# Patient Record
Sex: MALE | Race: BLACK OR AFRICAN AMERICAN | NOT HISPANIC OR LATINO | URBAN - METROPOLITAN AREA
[De-identification: names, ages, dates, MRNs, and addresses within clinical notes are randomized per-mention and may not be internally consistent; named-entity substitution may affect disease eponyms.]

---

## 2020-10-07 ENCOUNTER — OUTPATIENT (OUTPATIENT)
Dept: OUTPATIENT SERVICES | Facility: HOSPITAL | Age: 50
LOS: 1 days | End: 2020-10-07
Payer: COMMERCIAL

## 2020-10-07 VITALS
TEMPERATURE: 98 F | HEART RATE: 94 BPM | DIASTOLIC BLOOD PRESSURE: 96 MMHG | SYSTOLIC BLOOD PRESSURE: 144 MMHG | RESPIRATION RATE: 15 BRPM | HEIGHT: 69 IN | WEIGHT: 131.4 LBS | OXYGEN SATURATION: 100 %

## 2020-10-07 DIAGNOSIS — N21.0 CALCULUS IN BLADDER: ICD-10-CM

## 2020-10-07 DIAGNOSIS — Z01.818 ENCOUNTER FOR OTHER PREPROCEDURAL EXAMINATION: ICD-10-CM

## 2020-10-07 LAB
ANION GAP SERPL CALC-SCNC: 12 MMOL/L — SIGNIFICANT CHANGE UP (ref 5–17)
BUN SERPL-MCNC: 13 MG/DL — SIGNIFICANT CHANGE UP (ref 7–23)
CALCIUM SERPL-MCNC: 10.2 MG/DL — SIGNIFICANT CHANGE UP (ref 8.4–10.5)
CHLORIDE SERPL-SCNC: 104 MMOL/L — SIGNIFICANT CHANGE UP (ref 96–108)
CO2 SERPL-SCNC: 29 MMOL/L — SIGNIFICANT CHANGE UP (ref 22–31)
CREAT SERPL-MCNC: 0.84 MG/DL — SIGNIFICANT CHANGE UP (ref 0.5–1.3)
GLUCOSE SERPL-MCNC: 107 MG/DL — HIGH (ref 70–99)
HCT VFR BLD CALC: 45.5 % — SIGNIFICANT CHANGE UP (ref 39–50)
HGB BLD-MCNC: 15.3 G/DL — SIGNIFICANT CHANGE UP (ref 13–17)
MCHC RBC-ENTMCNC: 31.4 PG — SIGNIFICANT CHANGE UP (ref 27–34)
MCHC RBC-ENTMCNC: 33.6 GM/DL — SIGNIFICANT CHANGE UP (ref 32–36)
MCV RBC AUTO: 93.2 FL — SIGNIFICANT CHANGE UP (ref 80–100)
NRBC # BLD: 0 /100 WBCS — SIGNIFICANT CHANGE UP (ref 0–0)
PLATELET # BLD AUTO: 401 K/UL — HIGH (ref 150–400)
POTASSIUM SERPL-MCNC: 3.5 MMOL/L — SIGNIFICANT CHANGE UP (ref 3.5–5.3)
POTASSIUM SERPL-SCNC: 3.5 MMOL/L — SIGNIFICANT CHANGE UP (ref 3.5–5.3)
RBC # BLD: 4.88 M/UL — SIGNIFICANT CHANGE UP (ref 4.2–5.8)
RBC # FLD: 13.8 % — SIGNIFICANT CHANGE UP (ref 10.3–14.5)
SODIUM SERPL-SCNC: 145 MMOL/L — SIGNIFICANT CHANGE UP (ref 135–145)
WBC # BLD: 5.86 K/UL — SIGNIFICANT CHANGE UP (ref 3.8–10.5)
WBC # FLD AUTO: 5.86 K/UL — SIGNIFICANT CHANGE UP (ref 3.8–10.5)

## 2020-10-07 PROCEDURE — 93005 ELECTROCARDIOGRAM TRACING: CPT

## 2020-10-07 PROCEDURE — 87086 URINE CULTURE/COLONY COUNT: CPT

## 2020-10-07 PROCEDURE — 85027 COMPLETE CBC AUTOMATED: CPT

## 2020-10-07 PROCEDURE — 80048 BASIC METABOLIC PNL TOTAL CA: CPT

## 2020-10-07 PROCEDURE — G0463: CPT

## 2020-10-07 PROCEDURE — 93010 ELECTROCARDIOGRAM REPORT: CPT

## 2020-10-07 RX ORDER — TAMSULOSIN HYDROCHLORIDE 0.4 MG/1
1 CAPSULE ORAL
Qty: 0 | Refills: 0 | DISCHARGE

## 2020-10-07 RX ORDER — MOXIFLOXACIN HYDROCHLORIDE TABLETS, 400 MG 400 MG/1
1 TABLET, FILM COATED ORAL
Qty: 0 | Refills: 0 | DISCHARGE

## 2020-10-07 NOTE — H&P PST ADULT - NSANTHOSAYNRD_GEN_A_CORE
No. BRIGID screening performed.  STOP BANG Legend: 0-2 = LOW Risk; 3-4 = INTERMEDIATE Risk; 5-8 = HIGH Risk

## 2020-10-07 NOTE — H&P PST ADULT - HISTORY OF PRESENT ILLNESS
Mr. Alejo is a 50 year old man with no significant medical history who went to the hospital 2-3 weeks ago with hematuria and severe penile pain dx with stone in his bladder now scheduled for cystolitholapaxy, he presently is on Flomax and Cipro.  BP elevated at PST, c/o pain 9/10, EKG done, patient to call and make PCP appointment for medical evaluation.    COVID testing for 10/11/2020    Unable to close note with Dr. Hopkins's name, placed Dr. Morris's name to save note Mr. Alejo is a 50 year old man with no significant medical history who went to the hospital 2-3 weeks ago with hematuria and severe penile pain dx with stone in his bladder now scheduled for cystolitholapaxy, he presently is on Flomax and Cipro.  BP elevated at PST, c/o pain 9/10, EKG done, patient to call and make PCP appointment for medical evaluation.    COVID testing for 10/11/2020    Unable to close note with Dr. Hopkins's name, placed Dr. Morris's name to save note    ***medical and cardiac eval obtained. Procedure was scheduled for 10/14/20, but now is canceled for unknown reason. **** YULI Gonzalez NP 10/13/20

## 2020-10-08 LAB
CULTURE RESULTS: SIGNIFICANT CHANGE UP
SPECIMEN SOURCE: SIGNIFICANT CHANGE UP

## 2020-10-10 DIAGNOSIS — Z01.818 ENCOUNTER FOR OTHER PREPROCEDURAL EXAMINATION: ICD-10-CM

## 2020-10-10 PROBLEM — Z00.00 ENCOUNTER FOR PREVENTIVE HEALTH EXAMINATION: Status: ACTIVE | Noted: 2020-10-10

## 2020-10-11 ENCOUNTER — APPOINTMENT (OUTPATIENT)
Dept: DISASTER EMERGENCY | Facility: CLINIC | Age: 50
End: 2020-10-11

## 2020-12-01 NOTE — H&P PST ADULT - IS PATIENT PREGNANT?
Had not received the picture, so I called Evangelina.  She was on her way to see Dr. Cunningham at  office for swollen lid.  She said she did send the picture, is not sure why we can't see it.  I said that since she was seeing Dr. Cunningham, if the picture does not come thru, it is fine and not to worry.   not applicable (Male)

## 2021-01-24 LAB — SARS-COV-2 N GENE NPH QL NAA+PROBE: NOT DETECTED

## 2024-10-12 NOTE — H&P PST ADULT - PAIN LOCATION, PROFILE
Pt arrives to the ER with c/o right thumb pain after getting it caught in a mouse trap on Monday. +pain, swelling and redness. drainage from wound  
penile

## 2025-05-18 ENCOUNTER — INPATIENT (INPATIENT)
Facility: HOSPITAL | Age: 55
LOS: 4 days | Discharge: HOME HEALTH | DRG: 23 | End: 2025-05-23
Attending: PSYCHIATRY & NEUROLOGY | Admitting: RADIOLOGY
Payer: COMMERCIAL

## 2025-05-18 VITALS
SYSTOLIC BLOOD PRESSURE: 157 MMHG | OXYGEN SATURATION: 97 % | RESPIRATION RATE: 20 BRPM | HEART RATE: 103 BPM | DIASTOLIC BLOOD PRESSURE: 112 MMHG

## 2025-05-18 DIAGNOSIS — I63.9 CEREBRAL INFARCTION, UNSPECIFIED: ICD-10-CM

## 2025-05-18 LAB
A1C WITH ESTIMATED AVERAGE GLUCOSE RESULT: 5.6 % — SIGNIFICANT CHANGE UP (ref 4–5.6)
ADD ON TEST-SPECIMEN IN LAB: SIGNIFICANT CHANGE UP
ALBUMIN SERPL ELPH-MCNC: 3.7 G/DL — SIGNIFICANT CHANGE UP (ref 3.3–5)
ALBUMIN SERPL ELPH-MCNC: 4 G/DL — SIGNIFICANT CHANGE UP (ref 3.3–5)
ALP SERPL-CCNC: 86 U/L — SIGNIFICANT CHANGE UP (ref 40–120)
ALP SERPL-CCNC: 96 U/L — SIGNIFICANT CHANGE UP (ref 40–120)
ALT FLD-CCNC: 22 U/L — SIGNIFICANT CHANGE UP (ref 10–45)
ALT FLD-CCNC: 25 U/L — SIGNIFICANT CHANGE UP (ref 10–45)
ANION GAP SERPL CALC-SCNC: 13 MMOL/L — SIGNIFICANT CHANGE UP (ref 5–17)
ANION GAP SERPL CALC-SCNC: 14 MMOL/L — SIGNIFICANT CHANGE UP (ref 5–17)
APTT BLD: 29.5 SEC — SIGNIFICANT CHANGE UP (ref 26.1–36.8)
AST SERPL-CCNC: 22 U/L — SIGNIFICANT CHANGE UP (ref 10–40)
AST SERPL-CCNC: 23 U/L — SIGNIFICANT CHANGE UP (ref 10–40)
BASOPHILS # BLD AUTO: 0.02 K/UL — SIGNIFICANT CHANGE UP (ref 0–0.2)
BASOPHILS NFR BLD AUTO: 0.4 % — SIGNIFICANT CHANGE UP (ref 0–2)
BILIRUB SERPL-MCNC: 0.4 MG/DL — SIGNIFICANT CHANGE UP (ref 0.2–1.2)
BILIRUB SERPL-MCNC: 0.4 MG/DL — SIGNIFICANT CHANGE UP (ref 0.2–1.2)
BUN SERPL-MCNC: 16 MG/DL — SIGNIFICANT CHANGE UP (ref 7–23)
BUN SERPL-MCNC: 19 MG/DL — SIGNIFICANT CHANGE UP (ref 7–23)
CALCIUM SERPL-MCNC: 8.5 MG/DL — SIGNIFICANT CHANGE UP (ref 8.4–10.5)
CALCIUM SERPL-MCNC: 9.3 MG/DL — SIGNIFICANT CHANGE UP (ref 8.4–10.5)
CHLORIDE SERPL-SCNC: 108 MMOL/L — SIGNIFICANT CHANGE UP (ref 96–108)
CHLORIDE SERPL-SCNC: 109 MMOL/L — HIGH (ref 96–108)
CHOLEST SERPL-MCNC: 152 MG/DL — SIGNIFICANT CHANGE UP
CHOLEST SERPL-MCNC: 156 MG/DL — SIGNIFICANT CHANGE UP
CO2 SERPL-SCNC: 19 MMOL/L — LOW (ref 22–31)
CO2 SERPL-SCNC: 20 MMOL/L — LOW (ref 22–31)
CREAT SERPL-MCNC: 0.92 MG/DL — SIGNIFICANT CHANGE UP (ref 0.5–1.3)
CREAT SERPL-MCNC: 1.11 MG/DL — SIGNIFICANT CHANGE UP (ref 0.5–1.3)
EGFR: 78 ML/MIN/1.73M2 — SIGNIFICANT CHANGE UP
EGFR: 78 ML/MIN/1.73M2 — SIGNIFICANT CHANGE UP
EGFR: 98 ML/MIN/1.73M2 — SIGNIFICANT CHANGE UP
EGFR: 98 ML/MIN/1.73M2 — SIGNIFICANT CHANGE UP
EOSINOPHIL # BLD AUTO: 0.04 K/UL — SIGNIFICANT CHANGE UP (ref 0–0.5)
EOSINOPHIL NFR BLD AUTO: 0.8 % — SIGNIFICANT CHANGE UP (ref 0–6)
ESTIMATED AVERAGE GLUCOSE: 114 MG/DL — SIGNIFICANT CHANGE UP (ref 68–114)
ETHANOL SERPL-MCNC: <10 MG/DL — SIGNIFICANT CHANGE UP (ref 0–10)
GAS PNL BLDA: SIGNIFICANT CHANGE UP
GAS PNL BLDA: SIGNIFICANT CHANGE UP
GAS PNL BLDV: SIGNIFICANT CHANGE UP
GLUCOSE SERPL-MCNC: 122 MG/DL — HIGH (ref 70–99)
GLUCOSE SERPL-MCNC: 124 MG/DL — HIGH (ref 70–99)
HCT VFR BLD CALC: 42 % — SIGNIFICANT CHANGE UP (ref 39–50)
HCT VFR BLD CALC: 42.4 % — SIGNIFICANT CHANGE UP (ref 39–50)
HDLC SERPL-MCNC: 46 MG/DL — SIGNIFICANT CHANGE UP
HDLC SERPL-MCNC: 47 MG/DL — SIGNIFICANT CHANGE UP
HGB BLD-MCNC: 13.8 G/DL — SIGNIFICANT CHANGE UP (ref 13–17)
HGB BLD-MCNC: 14.2 G/DL — SIGNIFICANT CHANGE UP (ref 13–17)
INR BLD: 1.27 RATIO — HIGH (ref 0.85–1.16)
LDLC SERPL-MCNC: 96 MG/DL — SIGNIFICANT CHANGE UP
LDLC SERPL-MCNC: 98 MG/DL — SIGNIFICANT CHANGE UP
LIPID PNL WITH DIRECT LDL SERPL: 96 MG/DL — SIGNIFICANT CHANGE UP
LIPID PNL WITH DIRECT LDL SERPL: 98 MG/DL — SIGNIFICANT CHANGE UP
LYMPHOCYTES # BLD AUTO: 2.49 K/UL — SIGNIFICANT CHANGE UP (ref 1–3.3)
LYMPHOCYTES # BLD AUTO: 52 % — HIGH (ref 13–44)
MAGNESIUM SERPL-MCNC: 2 MG/DL — SIGNIFICANT CHANGE UP (ref 1.6–2.6)
MCHC RBC-ENTMCNC: 29.9 PG — SIGNIFICANT CHANGE UP (ref 27–34)
MCHC RBC-ENTMCNC: 30.4 PG — SIGNIFICANT CHANGE UP (ref 27–34)
MCHC RBC-ENTMCNC: 32.9 G/DL — SIGNIFICANT CHANGE UP (ref 32–36)
MCHC RBC-ENTMCNC: 33.5 G/DL — SIGNIFICANT CHANGE UP (ref 32–36)
MCV RBC AUTO: 90.8 FL — SIGNIFICANT CHANGE UP (ref 80–100)
MCV RBC AUTO: 91.1 FL — SIGNIFICANT CHANGE UP (ref 80–100)
MONOCYTES # BLD AUTO: 0.34 K/UL — SIGNIFICANT CHANGE UP (ref 0–0.9)
MONOCYTES NFR BLD AUTO: 7.1 % — SIGNIFICANT CHANGE UP (ref 2–14)
NEUTROPHILS # BLD AUTO: 1.9 K/UL — SIGNIFICANT CHANGE UP (ref 1.8–7.4)
NEUTROPHILS NFR BLD AUTO: 39.7 % — LOW (ref 43–77)
NONHDLC SERPL-MCNC: 107 MG/DL — SIGNIFICANT CHANGE UP
NONHDLC SERPL-MCNC: 109 MG/DL — SIGNIFICANT CHANGE UP
NRBC BLD AUTO-RTO: 0 /100 WBCS — SIGNIFICANT CHANGE UP (ref 0–0)
NRBC BLD AUTO-RTO: 0 /100 WBCS — SIGNIFICANT CHANGE UP (ref 0–0)
NT-PROBNP SERPL-SCNC: 2105 PG/ML — HIGH (ref 0–300)
PHOSPHATE SERPL-MCNC: 3.1 MG/DL — SIGNIFICANT CHANGE UP (ref 2.5–4.5)
PLATELET # BLD AUTO: 260 K/UL — SIGNIFICANT CHANGE UP (ref 150–400)
PLATELET # BLD AUTO: 294 K/UL — SIGNIFICANT CHANGE UP (ref 150–400)
POTASSIUM SERPL-MCNC: 4 MMOL/L — SIGNIFICANT CHANGE UP (ref 3.5–5.3)
POTASSIUM SERPL-MCNC: 4.3 MMOL/L — SIGNIFICANT CHANGE UP (ref 3.5–5.3)
POTASSIUM SERPL-SCNC: 4 MMOL/L — SIGNIFICANT CHANGE UP (ref 3.5–5.3)
POTASSIUM SERPL-SCNC: 4.3 MMOL/L — SIGNIFICANT CHANGE UP (ref 3.5–5.3)
PROT SERPL-MCNC: 6.6 G/DL — SIGNIFICANT CHANGE UP (ref 6–8.3)
PROT SERPL-MCNC: 7 G/DL — SIGNIFICANT CHANGE UP (ref 6–8.3)
PROTHROM AB SERPL-ACNC: 14.4 SEC — HIGH (ref 9.9–13.4)
RBC # BLD: 4.61 M/UL — SIGNIFICANT CHANGE UP (ref 4.2–5.8)
RBC # BLD: 4.67 M/UL — SIGNIFICANT CHANGE UP (ref 4.2–5.8)
RBC # FLD: 13.6 % — SIGNIFICANT CHANGE UP (ref 10.3–14.5)
RBC # FLD: 13.8 % — SIGNIFICANT CHANGE UP (ref 10.3–14.5)
SODIUM SERPL-SCNC: 141 MMOL/L — SIGNIFICANT CHANGE UP (ref 135–145)
SODIUM SERPL-SCNC: 142 MMOL/L — SIGNIFICANT CHANGE UP (ref 135–145)
T3 SERPL-MCNC: 142 NG/DL — SIGNIFICANT CHANGE UP (ref 80–200)
T4 AB SER-ACNC: 11.6 UG/DL — SIGNIFICANT CHANGE UP (ref 4.6–12)
T4 FREE SERPL-MCNC: 1.5 NG/DL — SIGNIFICANT CHANGE UP (ref 0.9–1.8)
TRIGL SERPL-MCNC: 52 MG/DL — SIGNIFICANT CHANGE UP
TRIGL SERPL-MCNC: 53 MG/DL — SIGNIFICANT CHANGE UP
TROPONIN T, HIGH SENSITIVITY RESULT: 10 NG/L — SIGNIFICANT CHANGE UP (ref 0–51)
TSH SERPL-MCNC: 1.68 UIU/ML — SIGNIFICANT CHANGE UP (ref 0.27–4.2)
WBC # BLD: 4.79 K/UL — SIGNIFICANT CHANGE UP (ref 3.8–10.5)
WBC # BLD: 7.96 K/UL — SIGNIFICANT CHANGE UP (ref 3.8–10.5)
WBC # FLD AUTO: 4.79 K/UL — SIGNIFICANT CHANGE UP (ref 3.8–10.5)
WBC # FLD AUTO: 7.96 K/UL — SIGNIFICANT CHANGE UP (ref 3.8–10.5)

## 2025-05-18 PROCEDURE — 76937 US GUIDE VASCULAR ACCESS: CPT | Mod: 26

## 2025-05-18 PROCEDURE — 93970 EXTREMITY STUDY: CPT | Mod: 26

## 2025-05-18 PROCEDURE — 70496 CT ANGIOGRAPHY HEAD: CPT | Mod: 26

## 2025-05-18 PROCEDURE — 36226 PLACE CATH VERTEBRAL ART: CPT | Mod: 59,LT

## 2025-05-18 PROCEDURE — 93010 ELECTROCARDIOGRAM REPORT: CPT

## 2025-05-18 PROCEDURE — 61645 PERQ ART M-THROMBECT &/NFS: CPT | Mod: RT

## 2025-05-18 PROCEDURE — 71045 X-RAY EXAM CHEST 1 VIEW: CPT | Mod: 26

## 2025-05-18 PROCEDURE — 70450 CT HEAD/BRAIN W/O DYE: CPT | Mod: 26,59

## 2025-05-18 PROCEDURE — 0042T: CPT

## 2025-05-18 PROCEDURE — 70498 CT ANGIOGRAPHY NECK: CPT | Mod: 26

## 2025-05-18 PROCEDURE — 99291 CRITICAL CARE FIRST HOUR: CPT

## 2025-05-18 RX ORDER — NICARDIPINE HCL 30 MG
5 CAPSULE ORAL
Qty: 40 | Refills: 0 | Status: DISCONTINUED | OUTPATIENT
Start: 2025-05-18 | End: 2025-05-18

## 2025-05-18 RX ORDER — TENECTEPLASE 50 MG
14 KIT INTRAVENOUS ONCE
Refills: 0 | Status: COMPLETED | OUTPATIENT
Start: 2025-05-18 | End: 2025-05-18

## 2025-05-18 RX ORDER — DEXMEDETOMIDINE HYDROCHLORIDE IN SODIUM CHLORIDE 4 UG/ML
0.2 INJECTION INTRAVENOUS
Qty: 200 | Refills: 0 | Status: DISCONTINUED | OUTPATIENT
Start: 2025-05-18 | End: 2025-05-18

## 2025-05-18 RX ORDER — SENNA 187 MG
2 TABLET ORAL AT BEDTIME
Refills: 0 | Status: DISCONTINUED | OUTPATIENT
Start: 2025-05-18 | End: 2025-05-23

## 2025-05-18 RX ORDER — TAMSULOSIN HYDROCHLORIDE 0.4 MG/1
0.4 CAPSULE ORAL AT BEDTIME
Refills: 0 | Status: DISCONTINUED | OUTPATIENT
Start: 2025-05-18 | End: 2025-05-23

## 2025-05-18 RX ORDER — BUDESONIDE 0.25 MG/2ML
0.25 SUSPENSION RESPIRATORY (INHALATION) ONCE
Refills: 0 | Status: DISCONTINUED | OUTPATIENT
Start: 2025-05-18 | End: 2025-05-18

## 2025-05-18 RX ORDER — SUGAMMADEX 100 MG/ML
240 INJECTION, SOLUTION INTRAVENOUS ONCE
Refills: 0 | Status: COMPLETED | OUTPATIENT
Start: 2025-05-18 | End: 2025-05-18

## 2025-05-18 RX ORDER — ACETAMINOPHEN 500 MG/5ML
650 LIQUID (ML) ORAL EVERY 6 HOURS
Refills: 0 | Status: DISCONTINUED | OUTPATIENT
Start: 2025-05-18 | End: 2025-05-23

## 2025-05-18 RX ORDER — ATORVASTATIN CALCIUM 80 MG/1
40 TABLET, FILM COATED ORAL AT BEDTIME
Refills: 0 | Status: DISCONTINUED | OUTPATIENT
Start: 2025-05-18 | End: 2025-05-19

## 2025-05-18 RX ORDER — POLYETHYLENE GLYCOL 3350 17 G/17G
17 POWDER, FOR SOLUTION ORAL
Refills: 0 | Status: DISCONTINUED | OUTPATIENT
Start: 2025-05-18 | End: 2025-05-23

## 2025-05-18 RX ORDER — NICOTINE POLACRILEX 4 MG/1
1 GUM, CHEWING ORAL DAILY
Refills: 0 | Status: DISCONTINUED | OUTPATIENT
Start: 2025-05-18 | End: 2025-05-23

## 2025-05-18 RX ORDER — IPRATROPIUM BROMIDE AND ALBUTEROL SULFATE .5; 2.5 MG/3ML; MG/3ML
3 SOLUTION RESPIRATORY (INHALATION) ONCE
Refills: 0 | Status: COMPLETED | OUTPATIENT
Start: 2025-05-18 | End: 2025-05-18

## 2025-05-18 RX ADMIN — IPRATROPIUM BROMIDE AND ALBUTEROL SULFATE 3 MILLILITER(S): .5; 2.5 SOLUTION RESPIRATORY (INHALATION) at 11:29

## 2025-05-18 RX ADMIN — TAMSULOSIN HYDROCHLORIDE 0.4 MILLIGRAM(S): 0.4 CAPSULE ORAL at 23:19

## 2025-05-18 RX ADMIN — Medication 2 TABLET(S): at 23:19

## 2025-05-18 RX ADMIN — TENECTEPLASE 2016 MILLIGRAM(S): KIT at 11:00

## 2025-05-18 RX ADMIN — Medication 1 APPLICATION(S): at 21:20

## 2025-05-18 RX ADMIN — Medication 70 MILLILITER(S): at 20:09

## 2025-05-18 RX ADMIN — NICOTINE POLACRILEX 1 PATCH: 4 GUM, CHEWING ORAL at 17:16

## 2025-05-18 RX ADMIN — Medication 25 MG/HR: at 11:24

## 2025-05-18 RX ADMIN — ATORVASTATIN CALCIUM 40 MILLIGRAM(S): 80 TABLET, FILM COATED ORAL at 23:19

## 2025-05-18 RX ADMIN — NICOTINE POLACRILEX 1 PATCH: 4 GUM, CHEWING ORAL at 19:20

## 2025-05-18 RX ADMIN — Medication 70 MILLILITER(S): at 16:02

## 2025-05-18 RX ADMIN — SUGAMMADEX 240 MILLIGRAM(S): 100 INJECTION, SOLUTION INTRAVENOUS at 13:00

## 2025-05-18 RX ADMIN — DEXMEDETOMIDINE HYDROCHLORIDE IN SODIUM CHLORIDE 2.83 MICROGRAM(S)/KG/HR: 4 INJECTION INTRAVENOUS at 16:02

## 2025-05-18 RX ADMIN — Medication 10 MILLILITER(S): at 11:00

## 2025-05-18 NOTE — PROGRESS NOTE ADULT - ASSESSMENT
ASSESSMENT/PLAN: 54M s/p RM1 occlusion s/p mechanical thrombectomy TICI 3    NEURO:  Neuro/Vitals q 1  CTH in AM and CTH at 11AM for 24hr TNK  Aspirin 81mg daily will be started 24h after tenecteplase CTH if negative   MRI non contrast at somepoint  Pain: tylenol PRN  Activity: [] OOB as tolerated [x] Bedrest till 12hrs post TNK [] PT [] OT [] PMNR  Stroke Core Measures: pending  Right groin accessed c/d/i      PULM:   RA  SpO2 >92%  Incentive spirometry    CV:  SBP goal 100-140  Nicardipine gtt prn  started atorvastatin 40mg daily  TTE w/ bubble study- pending  EKG    RENAL:  Fluids: NS @70  continue home flomax 0.4mg at bedtime for hx BPH  Monitor IOs   Replete lytes PRN     GI:  Diet: NPO  GI prophylaxis [] not indicated [x] PPI [] other:  Bowel regimen [x] senna [] other: miralax  Last BM: PTA  S/S pending per stroke protocol     ENDO:   Goal euglycemia (-180)    HEME/ONC:  VTE prophylaxis: [x] SCDs [] chemoprophylaxis [x] hold chemoprophylaxis due to: POD 0 mechanical thrombectomy [] high risk of DVT/PE on admission due to:  5/17 LED: + L soleal DVT, consult vascular cards in AM   Rpt LED in 5 days    ID:  afebrile  No leukocytosis   nontoxic appearing    MISC:    SOCIAL/FAMILY:  [] awaiting [x] updated at bedside [] family meeting    CODE STATUS:  [x] Full Code [] DNR [] DNI [] Palliative/Comfort Care    DISPOSITION:  [x] ICU [] Stroke Unit [] Floor [] EMU [] RCU [] PCU

## 2025-05-18 NOTE — PRE PROCEDURE NOTE - PRE PROCEDURE EVALUATION
Interventional Neuro Radiology  Pre-Procedure Note    This is a 54y  RH man with a PMHx significant for HTN presenting as code stroke for Left sided weakness. Patient was driving, pulled over  due to chest pain and SOB. Neighbor called EMS after finding him on the floor with slurred speech and L sided weakness.Spoke with Wife Daiana, who saw him this morning at his baseline. He was complaining of SOB. Denies being on AC/AP. No hx stroke. Does not take any medications. At baseline, o x3, ambulates without assistance, independent of all Adls.    EXAM:  Physical Examination:  General - NAD    Neurologic Exam:  Mental status - Awake, Alert, Oriented to person, place (Hospital), and Month, year, not to date or day of week. Speech severely dysarthric, mild dysfluency, repetition and naming intact. Follows simple and cross commands.     Cranial nerves - PERRL, LHH,  eyes cross midline, face sensation (V1-V3) intact b/l, L facial palsy, hearing intact b/l, palate with symmetric elevation, trapezius  5/5 strength b/l, tongue midline on protrusion with full lateral movement    Motor -   Strength testing    Left arm and leg no movement  Right arm leg no drift    Sensation - Light touch reduced left side  Extinction: visual and tactile to left side  Left gaze palsy      Coordination - Finger to Nose intact right. No tremors appreciated    Gait and station - Unable to assess      [facility]- 17    IV thrombolytic/ Tenecteplase given, completed at 1100h.       PAST MEDICAL & SURGICAL HISTORY:  HTN (hypertension)      No significant past surgical history          Social History:   Denies tobacco use    FAMILY HISTORY:  No pertinent family history in first degree relatives      No pertinent family history    Allergies: No Known Allergies      Current Medications: buDESOnide    Inhalation Suspension 0.25 milliGRAM(s) Inhalation Once  niCARdipine Infusion 5 mG/Hr IV Continuous <Continuous>      Labs:                         14.2   4.79  )-----------( 294      ( 18 May 2025 10:31 )             42.4       05-18    142  |  109[H]  |  19  ----------------------------<  122[H]  4.3   |  20[L]  |  1.11    Ca    9.3      18 May 2025 10:31    TPro  7.0  /  Alb  4.0  /  TBili  0.4  /  DBili  x   /  AST  23  /  ALT  25  /  AlkPhos  96  05-18      Blood Bank: 05-18-25  B  --  Positive      Assessment/Plan:   This is a 55yo male  presents with right M1 occlusion. Patient presents to neuro-IR for selective cerebral angiography and thrombectomy. Procedure/ risks/ benefits/ goals/ alternatives were explained. Risks include but are not limited to stroke/ vessel injury/ hemorrhage/ groin hematoma. All questions answered.   Informed content obtained from patient's family.Consent placed in chart.     INR fellow contacted at 1056h.

## 2025-05-18 NOTE — CHART NOTE - NSCHARTNOTEFT_GEN_A_CORE
Interventional Neuro- Radiology   Procedure Note    Procedure: Selective Cerebral Angiography and Thrombectomy  Pre- Procedure Diagnosis: right M1 occlusion   Post- Procedure Diagnosis: TICI 3 recanalization     : Melvina HAMMOND   Fellow:  Dr. Cam MA  Physician Assistant: Yolanda Yanez PA-C    RN: Kelly   Tech: Sal/ Nelson     Anesthesia:  Dr. Figueroa (general anesthesia)    I/Os:  Fluids: 600cc   Pizano: DTV   Contrast: 28cc   Estimated Blood Loss: <10cc    NIHSS post procedure: 34- intubated- due to not NPO prior to procedure     Preliminary Report:  Under general anesthesia, using a 8Fr short sheath to the right groin examination of left vertebral artery right internal carotid artery via selective cerebral angiography demonstrates right M1 occlusion s/p MT x1 resulting in TICI 3 recanalization. (Official note to follow).    Patient tolerated procedure well, vital signs stable, hemodynamically stable, remains intubated and sedated. Results discussed with neurosurgery/ patient's family. Stroke order set post-thrombectomy ordered. Patient transferred to NSCU for further care/ monitoring.     Vascular access site:  Ultrasound guidance- yes  Fluoroscopy before procedure- yes   Fluoroscopy to confirm correct needle position after puncture and before advancing sheath- yes  Femoral artery angiography before sheath removal- yes  Closure device used- Celt  Closure device appropriately deployed- yes  Manual pressure- held for 10minutes until hemostasis, no active bleeding or hematoma  Safeguard dressing applied- yes, applied at 1230h.

## 2025-05-18 NOTE — PATIENT PROFILE ADULT - FALL HARM RISK - HARM RISK INTERVENTIONS
Assistance with ambulation/Assistance OOB with selected safe patient handling equipment/Communicate Risk of Fall with Harm to all staff/Monitor gait and stability/Reinforce activity limits and safety measures with patient and family/Review medications for side effects contributing to fall risk/Sit up slowly, dangle for a short time, stand at bedside before walking/Tailored Fall Risk Interventions/Toileting schedule using arm’s reach rule for commode and bathroom/Use of alarms - bed, chair and/or voice tab/Visual Cue: Yellow wristband and red socks/Bed in lowest position, wheels locked, appropriate side rails in place/Call bell, personal items and telephone in reach/Instruct patient to call for assistance before getting out of bed or chair/Non-slip footwear when patient is out of bed/[place] to call system/Physically safe environment - no spills, clutter or unnecessary equipment/Purposeful Proactive Rounding/Room/bathroom lighting operational, light cord in reach

## 2025-05-18 NOTE — ED PROVIDER NOTE - CLINICAL SUMMARY MEDICAL DECISION MAKING FREE TEXT BOX
53 y/o M hx of HTN, smoker presenting BIBEMS as a code stroke with a NIH of 17, neuro at bedside upon arrival. LNK was confirmed 9:05. CTH negative for bleed, CTA confirms M1 occulision, pt was started on a cardene gtt for /112 with improvement to 168/89. within window for TNK with no major contraindications. pt and wife dayana consented. TNK was pushed at 11:00am. EKG significant for LVH, trop 10. further disposition and intervention recommendations from neuro pending at this time, 55 y/o M hx of HTN, smoker presenting BIBEMS as a code stroke with a NIH of 17, neuro at bedside upon arrival. LNK was confirmed 9:05. CTH negative for bleed, CTA confirms M1 occulision, pt was started on a cardene gtt for /112 with improvement to 168/89. within window for TNK with no major contraindications. pt and wife dayana consented. TNK was pushed at 11:00am. EKG significant for LVH, trop 10, suspect underlying cardiac disease, will order TTE,. further disposition and intervention recommendations from neuro pending at this time, will tx wheezing with duonebs and budesonide. 53 y/o M hx of HTN, smoker presenting BIBEMS as a code stroke with a NIH of 17, neuro at bedside upon arrival. LNK was confirmed 9:05. CTH negative for bleed, CTA confirms M1 occulision, pt was started on a cardene gtt for /112 with improvement to 168/89. within window for TNK with no major contraindications. pt and wife dayana consented. TNK was pushed at 11:00am. EKG significant for LVH, trop 10, suspect underlying cardiac disease, will order TTE,. further disposition and intervention recommendations from neuro pending at this time, will tx wheezing with duonebs and budesonide.    Dr. Fregoso (Attending Physician)

## 2025-05-18 NOTE — ED PROVIDER NOTE - PHYSICAL EXAMINATION
GENERAL: right gaze preference   HEAD: normocephalic, atraumatic  HEENT: normal conjunctiva, oral mucosa moist, tongue deviation to the left   CARDIAC: regular rate and rhythm, no appreciable murmurs,   PULM: end expiratory wheezing   GI: abdomen nondistended, soft, nontender, no guarding, rebound tenderness  NEURO: left UE and LE pelagic,  left facial droop involving the forehead, left dysarthric,  PERRLA, right gaze deviation, neglect on the left visual fields, unable to ambulate,AAOx3  MSK: no peripheral edema, no calf tenderness b/l  SKIN: well-perfused, extremities warm, no visible rashes  PSYCH: appropriate mood and affect GENERAL: right gaze preference, right head deviation   HEAD: normocephalic, atraumatic  HEENT: normal conjunctiva, oral mucosa moist, tongue deviation to the left   CARDIAC: regular rate and rhythm, no appreciable murmurs,   PULM: end expiratory wheezing   GI: abdomen nondistended, soft, nontender, no guarding, rebound tenderness  NEURO: left UE and LE pelagic, left facial droop involving the forehead, dysarthric, PERRLA, right gaze deviation, neglect on the left visual fields, unable to ambulate, AAOx3  MSK: no peripheral edema, no calf tenderness b/l, 0/5 strength and no sensation in the LUE and LLE,  5/5 strength with no drift in RUE/RLE.   SKIN: well-perfused, extremities warm, no visible rashes  PSYCH: appropriate mood and affect

## 2025-05-18 NOTE — ED PROVIDER NOTE - CARE PLAN
1 Principal Discharge DX:	Stroke   Principal Discharge DX:	Stroke  Secondary Diagnosis:	LVH (left ventricular hypertrophy)

## 2025-05-18 NOTE — H&P ADULT - NSHPPHYSICALEXAM_GEN_ALL_CORE
Vitals:  T(C): 37.1 (05-18-25 @ 11:15), Max: 37.1 (05-18-25 @ 10:50)  HR: 105 (05-18-25 @ 11:15) (98 - 105)  BP: 168/92 (05-18-25 @ 11:15) (157/112 - 171/112)  RR: 20 (05-18-25 @ 11:15) (19 - 20)  SpO2: 98% (05-18-25 @ 11:15) (97% - 100%)    Physical Examination:  General - NAD    Neurologic Exam:  Mental status - Awake, Alert, Oriented to person, place, and time. Speech severely dysarthric, mild dysfluency, repetition and naming intact. Follows simple and cross.     Cranial nerves - PERRL, LHH,  eyes cross midline, face sensation (V1-V3) intact b/l, L facial palsy, hearing intact b/l, palate with symmetric elevation, trapezius  5/5 strength b/l, tongue midline on protrusion with full lateral movement    Motor -   Strength testing    Left arm and leg no movement  Right arm leg no drift    Sensation - Light touch reduced left side  Extinction: visual and tactile to left side  Left gaze palsy      Coordination - Finger to Nose intact right. No tremors appreciated    Gait and station - Unable to assess Vitals:  T(C): 37.1 (05-18-25 @ 11:15), Max: 37.1 (05-18-25 @ 10:50)  HR: 105 (05-18-25 @ 11:15) (98 - 105)  BP: 168/92 (05-18-25 @ 11:15) (157/112 - 171/112)  RR: 20 (05-18-25 @ 11:15) (19 - 20)  SpO2: 98% (05-18-25 @ 11:15) (97% - 100%)    Physical Examination:  General - NAD    Neurologic Exam:  Mental status - Awake, Alert, Oriented to person, place (Hospital), and Month, year, not to date or day of week. Speech severely dysarthric, mild dysfluency, repetition and naming intact. Follows simple and cross commands.     Cranial nerves - PERRL, LHH,  eyes cross midline, face sensation (V1-V3) intact b/l, L facial palsy, hearing intact b/l, palate with symmetric elevation, trapezius  5/5 strength b/l, tongue midline on protrusion with full lateral movement    Motor -   Strength testing    Left arm and leg no movement  Right arm leg no drift    Sensation - Light touch reduced left side  Extinction: visual and tactile to left side  Left gaze palsy      Coordination - Finger to Nose intact right. No tremors appreciated    Gait and station - Unable to assess

## 2025-05-18 NOTE — PROGRESS NOTE ADULT - SUBJECTIVE AND OBJECTIVE BOX
HPI:   54M RH man with a PMHx significant for HTN, HLD, smoker. Presenting as code stroke for Left sided weakness, NIHSS 17. Patient was driving, pulled over due to chest pain and SOB. Neighbor called EMS after finding him on the floor with slurred speech and L sided weakness. Spoke with Wife Daiana, who saw him this morning at his baseline. He was complaining of SOB. Left hemiplegia, LHH, mild dysfluency with severe dysarthria, right gaze preference. CTH Age-indeterminate infarct in the right frontal white matter,  mL at risk ischemic tissue involving the right MCA. CTA R M1.      Admission Scores  NIHSS: 17    LKN 9:05 Am 5/18  pre mRS 0    tenecteplase given at 11:00 AM 5/18  Patient is a thrombectomy candidate, taken from ER to IR    HOSPITAL COURSE:  5/18 tenecteplase given at 11:00 AM, then s/p cerebral angiogram for R M1 occlusion mechanical thrombectomy TICI 3 with Dr. Hein, returned to ICU intubated d/t meal this morning, extubated, LED: + L soleal DVT     Allergies    No Known Allergies    Intolerances    REVIEW OF SYSTEMS: [x ] Unable to Assess due to neurologic exam   [ ] All ROS addressed below are non-contributory, except:  Neuro: [ ] Headache [ ] Back pain [ ] Numbness [ ] Weakness [ ] Ataxia [ ] Dizziness [ ] Aphasia [ ] Dysarthria [ ] Visual disturbance  Resp: [ ] Shortness of breath/dyspnea, [ ] Orthopnea [ ] Cough  CV: [ ] Chest pain [ ] Palpitation [ ] Lightheadedness [ ] Syncope  Renal: [ ] Thirst [ ] Edema  GI: [ ] Nausea [ ] Emesis [ ] Abdominal pain [ ] Constipation [ ] Diarrhea  Hem: [ ] Hematemesis [ ] bright red blood per rectum  ID: [ ] Fever [ ] Chills [ ] Dysuria  ENT: [ ] Rhinorrhea      DEVICES:   [x ] Restraints [ x] ET tube [ x] avilez    ICU Vital Signs Last 24 Hrs  T(C): 37.1 (18 May 2025 19:00), Max: 37.1 (18 May 2025 10:50)  T(F): 98.7 (18 May 2025 19:00), Max: 98.7 (18 May 2025 10:50)  HR: 76 (18 May 2025 20:00) (68 - 107)  BP: 117/73 (18 May 2025 20:00) (95/64 - 172/88)  BP(mean): 90 (18 May 2025 20:00) (76 - 109)  ABP: --  ABP(mean): --  RR: 23 (18 May 2025 20:00) (12 - 26)  SpO2: 96% (18 May 2025 20:00) (90% - 100%)    O2 Parameters below as of 18 May 2025 19:00  Patient On (Oxygen Delivery Method): room air    CAPILLARY BLOOD GLUCOSE  116 (18 May 2025 11:24)      POCT Blood Glucose.: 116 mg/dL (18 May 2025 10:28)    I&O's Summary    18 May 2025 07:01  -  18 May 2025 20:26  --------------------------------------------------------  IN: 589.4 mL / OUT: 450 mL / NET: 139.4 mL          Respiratory: RA    LABS:                          13.8   7.96  )-----------( 260      ( 18 May 2025 13:49 )             42.0       05-18    141  |  108  |  16  ----------------------------<  124[H]  4.0   |  19[L]  |  0.92    Ca    8.5      18 May 2025 13:49  Phos  3.1     05-18  Mg     2.0     05-18    TPro  6.6  /  Alb  3.7  /  TBili  0.4  /  DBili  x   /  AST  22  /  ALT  22  /  AlkPhos  86  05-18      MEDICATIONS  (STANDING):  atorvastatin 40 milliGRAM(s) Oral at bedtime  chlorhexidine 4% Liquid 1 Application(s) Topical at bedtime  dexMEDEtomidine Infusion 0.2 MICROgram(s)/kG/Hr (2.83 mL/Hr) IV Continuous <Continuous>  niCARdipine Infusion 5 mG/Hr (25 mL/Hr) IV Continuous <Continuous>  nicotine -  14 mG/24Hr(s) Patch 1 Patch Transdermal daily  pantoprazole  Injectable 40 milliGRAM(s) IV Push daily  polyethylene glycol 3350 17 Gram(s) Oral two times a day  senna 2 Tablet(s) Oral at bedtime  sodium chloride 0.9%. 1000 milliLiter(s) (70 mL/Hr) IV Continuous <Continuous>  tamsulosin 0.4 milliGRAM(s) Oral at bedtime    MEDICATIONS  (PRN):  acetaminophen     Tablet .. 650 milliGRAM(s) Oral every 6 hours PRN Temp greater or equal to 38.5C (101.3F), Mild Pain (1 - 3)    IMAGING:    < from: CT Brain Stroke Protocol (05.18.25 @ 10:52) >  IMPRESSION:  No acute intracranial hemorrhage, mass effect, or midline shift.    Age-indeterminate infarct in the right frontal white matter.    Please see subsequent CTA regarding right MCA occlusion.    Findings were discussed with Dr. Gerry Miller  5/18/2025 10:55 AM by Dr. Diaz with read back confirmation.    --- End of Report---     VICK DIAZ MD; Resident Radiologist  This document has been electronically signed.  DAVIDE SARAVIA MD; Attending Radiologist  This document has been electronically signed. May 18 2025 11:05AM    < end of copied text >      < from: CT Angio Brain Stroke Protocol  w/ IV Cont (05.18.25 @ 10:50) >  IMPRESSION:  CT angiogram neck:  -No vaso-occlusive disease.    CT angiogram head:  -Right MCA M1 occlusion. Relative paucity of flow related enhancement in   the distal right MCA branches. Findings were discussed with Dr. Joe Miller 5/18/2025 10:55 AM by Dr. Diaz with read back confirmation.    CT perfusion:  -144 mL at risk ischemic tissue involving the right MCA territory   diffusely.  -0 mL core infarct identified.  -Mismatch ratio: Infinite.  -Mismatch volume: 144 mL.    _____________  NASCET criteria for internal carotid artery stenosis:  Mild: 0% to 49%  Moderate: 50% to 69%  Severe: 70% to 99%  Complete Occlusion    --- End of Report ---    DAVIDE SARAVIA MD; Attending Radiologist  This document has been electronically signed. May 18 2025 11:16AM    < end of copied text >      EXAMINATION:  PHYSICAL EXAM:     Constitutional: No Acute Distress     Neurological: AOx3, PERRL, EOMI, no gaze preference, L facial, LUE drift, LUE 4/5, LLE 4+/5, Right side 5/5     Pulmonary: Clear to Auscultation, No rales, No rhonchi, No wheezes     Cardiovascular: Regular rate and rhythm     Gastrointestinal: Soft, Non-tender, Non-distended     Extremities: No calf tenderness     Incision: Right groin c/d/i

## 2025-05-18 NOTE — H&P ADULT - NSHPLABSRESULTS_GEN_ALL_CORE
Labs:                        14.2   4.79  )-----------( 294      ( 18 May 2025 10:31 )             42.4     05-18    142  |  109[H]  |  19  ----------------------------<  122[H]  4.3   |  20[L]  |  1.11    Ca    9.3      18 May 2025 10:31    TPro  7.0  /  Alb  4.0  /  TBili  0.4  /  DBili  x   /  AST  23  /  ALT  25  /  AlkPhos  96  05-18    CAPILLARY BLOOD GLUCOSE  116 (18 May 2025 11:24)      POCT Blood Glucose.: 116 mg/dL (18 May 2025 10:28)    LIVER FUNCTIONS - ( 18 May 2025 10:31 )  Alb: 4.0 g/dL / Pro: 7.0 g/dL / ALK PHOS: 96 U/L / ALT: 25 U/L / AST: 23 U/L / GGT: x             PT/INR - ( 18 May 2025 10:31 )   PT: 14.4 sec;   INR: 1.27 ratio         PTT - ( 18 May 2025 10:31 )  PTT:29.5 sec      < from: CT Brain Stroke Protocol (05.18.25 @ 10:52) >    IMPRESSION:  No acute intracranial hemorrhage, mass effect, or midline shift.    Age-indeterminate infarct in the right frontal white matter.    < end of copied text >    < from: CT Angio Neck Stroke Protocol w/ IV Cont (05.18.25 @ 10:51) >    IMPRESSION:  CT angiogram neck:  -No vaso-occlusive disease.    CT angiogram head:  -Right MCA M1 occlusion. Relative paucity of flow related enhancement in   the distal right MCA branches. Findings were discussed with Dr. Joe Miller 5/18/2025 10:55 AM by Dr. Purcell with read back confirmation.    CT perfusion:  -144 mL at risk ischemic tissue involving the right MCA territory   diffusely.  -0 mL core infarct identified.  -Mismatch ratio: Infinite.  -Mismatch volume: 144 mL.    < end of copied text > Labs:                        14.2   4.79  )-----------( 294      ( 18 May 2025 10:31 )             42.4     05-18    142  |  109[H]  |  19  ----------------------------<  122[H]  4.3   |  20[L]  |  1.11    Ca    9.3      18 May 2025 10:31    TPro  7.0  /  Alb  4.0  /  TBili  0.4  /  DBili  x   /  AST  23  /  ALT  25  /  AlkPhos  96  05-18    CAPILLARY BLOOD GLUCOSE  116 (18 May 2025 11:24)      POCT Blood Glucose.: 116 mg/dL (18 May 2025 10:28)    LIVER FUNCTIONS - ( 18 May 2025 10:31 )  Alb: 4.0 g/dL / Pro: 7.0 g/dL / ALK PHOS: 96 U/L / ALT: 25 U/L / AST: 23 U/L / GGT: x             PT/INR - ( 18 May 2025 10:31 )   PT: 14.4 sec;   INR: 1.27 ratio         PTT - ( 18 May 2025 10:31 )  PTT:29.5 sec      < from: CT Brain Stroke Protocol (05.18.25 @ 10:52) >    IMPRESSION:  No acute intracranial hemorrhage, mass effect, or midline shift.    Age-indeterminate infarct in the right frontal white matter.    < end of copied text >    < from: CT Angio Neck Stroke Protocol w/ IV Cont (05.18.25 @ 10:51) >    IMPRESSION:  CT angiogram neck:  -No vaso-occlusive disease.    CT angiogram head:  -Right MCA M1 occlusion. Relative paucity of flow related enhancement in   the distal right MCA branches.     CT perfusion:  -144 mL at risk ischemic tissue involving the right MCA territory   diffusely.  -0 mL core infarct identified.  -Mismatch ratio: Infinite.  -Mismatch volume: 144 mL.    < end of copied text >

## 2025-05-18 NOTE — H&P ADULT - ATTENDING COMMENTS
DOS 5/19  seen in NSCU briefly    54y (1970) RH man with a PMHx significant for HTN presenting as code stroke for Left sided weakness. On exam, Left hemiplegia, LHH, mild dysfluency with severe dysarthria, right gaze preference. CTH Age-indeterminate infarct in the right frontal white matter,  mL at risk ischemic tissue involving the right MCA. CTA R M1.   LKN 9:05 Am 5/18  NIHSS 22--> 4   pre mRS 0  tenecteplase given at 11:00 AM 5/18  Patient is a thrombectomy candidate RM1  s/p TICI 3 thromboectom   LDL 96  A1c 5.6  acute LLE DVT   significant improvement in exam now with LUE and LLE drift slow FFM and decrease sensation NIHSS ~4    Impression: Left hemiplegia, LHH, mild dysfluency with severe dysarthria, right gaze preference due to acute ischemic stroke, R M1 occlusion s/p tenecteplase and TICI3  thrombectomy   Mechanism pending further workup , ESUS     Plan:   - NSCU after thrombectomy   - Please repeat CTH in 24 hours after tenecteplase administration 5/19 11AM; if stable start ASA  -] MRI w/o contrast:  - TTE w/ bubble study; may need JACEK and ILR  - consider CT CAP  - hypercoag workup   -  Basic labs: CBC,CMP, coagulation panel and troponin,  HgbA1C, lipid panel  - Please obtain urine drug screen  - Aspirin 81mg daily will be started 24h after tenecteplase if repeat CTH negative for hemorrhage.   - Atorvastatin 40 mg daily (long-term goal and titrate to LDL < 70)  -  Baseline EKG and CXR  -   After tenecteplase administration (Started at 11AM 5/18):  >Neurochecks: Every 15 mins x two  hours, then every 30 mins x6 hours, then every hour x 16 hours.   >No avilez removal or placement for 24 hours   >No venous/arterial puncture at non-compressible site   >No anticoagulation or anti-platelet agents for 24 hours   >Cardene drip as needed to keep BP < 180/105     -Continuous cardiac telemetry to monitor for arrhythmia   - Frequent neuro-checks (q15min for 4h then q1h for 24h then q4h)   - Permissive HTN up to /105 for 48 hours then gradual normotension over 2-3 days.    - Intravenous hydration with normal saline at 75cc per hour if needed   - NPO unless passess dysphagia screen. If fails, perform SLP eval    - Head of bed > 30 degrees for aspiration prevention and aspiration precautions ordered.   -Tight glucose control (long-term goal HgbA1c < 6%)   -Stroke education and counseling   -PT/OT/ SLP consults   -SW / [facility] for help with discharge when stable   -Prophylaxis: SCDs (DVT), can start medical DVT ppx after tenecteplase if repeat CTH negative for hemorrhage,   okay to tx to stroke unit  when bed available   Enrique Blanton MD  Vascular Neurology  Office: [phone]

## 2025-05-18 NOTE — ED ADULT NURSE NOTE - OBJECTIVE STATEMENT
55y/o male presents to ER via EMS as 53y/o male presents to ER via EMS for code stroke. Code stroke activated 1024. PMH HTN, hld, smoker. Per EMS pt was last known normal at 905. Pt lives with wife at home. Pt walked out to his car this morning around 0930 and was found slumped over by the car by bystander. Pt endorsing SOB x 1 week. Denies CP, n/v/, abd pain. A&Ox3, airway patent, right sided facial droop and right deviation noted, equal B/L right sided upper and lower extremity strength. Left sided upper and lower extremity - 0 effort against gravity. Pt placed on continuous cardiac monitor. IV inserted labs drawn and sent. Safety maintained bed is in the lowest position, locked and call bell in reach.

## 2025-05-18 NOTE — PROGRESS NOTE ADULT - SUBJECTIVE AND OBJECTIVE BOX
HPI:   54M RH man with a PMHx significant for HTN, HLD, smoker. Presenting as code stroke for Left sided weakness, NIHSS 17. Patient was driving, pulled over due to chest pain and SOB. Neighbor called EMS after finding him on the floor with slurred speech and L sided weakness. Spoke with Wife Daiana, who saw him this morning at his baseline. He was complaining of SOB. Left hemiplegia, LHH, mild dysfluency with severe dysarthria, right gaze preference. CTH Age-indeterminate infarct in the right frontal white matter,  mL at risk ischemic tissue involving the right MCA. CTA R M1.      Admission Scores  NIHSS: 17    LKN 9:05 Am 5/18  pre mRS 0    tenecteplase given at 11:00 AM 5/18  Patient is a thrombectomy candidate, taken from ER to IR    HOSPITAL COURSE:  5/18 tenecteplase given at 11:00 AM, then s/p cerebral angiogram for R M1 occlusion mechanical thrombectomy TICI 3 with Dr. Hein, returned to ICU intubated d/t meal this morning    Allergies    No Known Allergies    Intolerances    REVIEW OF SYSTEMS: [x ] Unable to Assess due to neurologic exam   [ ] All ROS addressed below are non-contributory, except:  Neuro: [ ] Headache [ ] Back pain [ ] Numbness [ ] Weakness [ ] Ataxia [ ] Dizziness [ ] Aphasia [ ] Dysarthria [ ] Visual disturbance  Resp: [ ] Shortness of breath/dyspnea, [ ] Orthopnea [ ] Cough  CV: [ ] Chest pain [ ] Palpitation [ ] Lightheadedness [ ] Syncope  Renal: [ ] Thirst [ ] Edema  GI: [ ] Nausea [ ] Emesis [ ] Abdominal pain [ ] Constipation [ ] Diarrhea  Hem: [ ] Hematemesis [ ] bright red blood per rectum  ID: [ ] Fever [ ] Chills [ ] Dysuria  ENT: [ ] Rhinorrhea      DEVICES:   [x ] Restraints [ x] ET tube [ x] avilez  VITALS:   Vital Signs Last 24 Hrs  T(C): 35.9 (18 May 2025 12:45), Max: 37.1 (18 May 2025 10:50)  T(F): 96.7 (18 May 2025 12:45), Max: 98.7 (18 May 2025 10:50)  HR: 80 (18 May 2025 13:15) (80 - 107)  BP: 129/73 (18 May 2025 13:15) (129/73 - 172/88)  BP(mean): 95 (18 May 2025 13:15) (95 - 109)  RR: 17 (18 May 2025 13:15) (17 - 20)  SpO2: 100% (18 May 2025 13:15) (90% - 100%)    Parameters below as of 18 May 2025 11:45  Patient On (Oxygen Delivery Method): nasal cannula  O2 Flow (L/min): 4    CAPILLARY BLOOD GLUCOSE  116 (18 May 2025 11:24)      POCT Blood Glucose.: 116 mg/dL (18 May 2025 10:28)    I&O's Summary      Respiratory:  Mode: AC/ CMV (Assist Control/ Continuous Mandatory Ventilation)  RR (machine): 12  TV (machine): 520  FiO2: 40  PEEP: 6  ITime: 1.5  MAP: 11  PIP: 24        LABS:                        14.2   4.79  )-----------( 294      ( 18 May 2025 10:31 )             42.4     05-18    142  |  109[H]  |  19  ----------------------------<  122[H]  4.3   |  20[L]  |  1.11        MEDICATIONS  (STANDING):  atorvastatin 40 milliGRAM(s) Oral at bedtime  chlorhexidine 0.12% Liquid 15 milliLiter(s) Oral Mucosa every 12 hours  dexMEDEtomidine Infusion 0.2 MICROgram(s)/kG/Hr (2.83 mL/Hr) IV Continuous <Continuous>  niCARdipine Infusion 5 mG/Hr (25 mL/Hr) IV Continuous <Continuous>  nicotine -  14 mG/24Hr(s) Patch 1 Patch Transdermal daily  pantoprazole  Injectable 40 milliGRAM(s) IV Push daily  polyethylene glycol 3350 17 Gram(s) Oral two times a day  senna 2 Tablet(s) Oral at bedtime  sodium chloride 0.9%. 1000 milliLiter(s) (70 mL/Hr) IV Continuous <Continuous>  sugammadex Injectable 240 milliGRAM(s) IV Push once  tamsulosin 0.4 milliGRAM(s) Oral at bedtime    MEDICATIONS  (PRN):  acetaminophen     Tablet .. 650 milliGRAM(s) Oral every 6 hours PRN Temp greater or equal to 38.5C (101.3F), Mild Pain (1 - 3)            IMAGING:    < from: CT Brain Stroke Protocol (05.18.25 @ 10:52) >  IMPRESSION:  No acute intracranial hemorrhage, mass effect, or midline shift.    Age-indeterminate infarct in the right frontal white matter.    Please see subsequent CTA regarding right MCA occlusion.    Findings were discussed with Dr. Gerry Miller  5/18/2025 10:55 AM by Dr. Diaz with read back confirmation.    --- End of Report---     VICK DIAZ MD; Resident Radiologist  This document has been electronically signed.  DAVIDE SARAVIA MD; Attending Radiologist  This document has been electronically signed. May 18 2025 11:05AM    < end of copied text >      < from: CT Angio Brain Stroke Protocol  w/ IV Cont (05.18.25 @ 10:50) >  IMPRESSION:  CT angiogram neck:  -No vaso-occlusive disease.    CT angiogram head:  -Right MCA M1 occlusion. Relative paucity of flow related enhancement in   the distal right MCA branches. Findings were discussed with Dr. Joe Miller 5/18/2025 10:55 AM by Dr. Diaz with read back confirmation.    CT perfusion:  -144 mL at risk ischemic tissue involving the right MCA territory   diffusely.  -0 mL core infarct identified.  -Mismatch ratio: Infinite.  -Mismatch volume: 144 mL.    _____________  NASCET criteria for internal carotid artery stenosis:  Mild: 0% to 49%  Moderate: 50% to 69%  Severe: 70% to 99%  Complete Occlusion    --- End of Report ---    DAVIDE SARAVIA MD; Attending Radiologist  This document has been electronically signed. May 18 2025 11:16AM    < end of copied text >      EXAMINATION:  PHYSICAL EXAM: INCOMPLETE    Constitutional: No Acute Distress     Neurological: Intubated, Awake, alert oriented to person, place and time, Following Commands, PERRL, EOMI, No Gaze Preference, Face Symmetrical, Speech Fluent, No dysmetria, No ataxia, No nystagmus                                               Pulmonary: Clear to Auscultation, No rales, No rhonchi, No wheezes     Cardiovascular: S1, S2, Regular rate and rhythm     Gastrointestinal: Soft, Non-tender, Non-distended     Extremities: No calf tenderness     Incision: Right groin c/d/i   HPI:   54M RH man with a PMHx significant for HTN, HLD, smoker. Presenting as code stroke for Left sided weakness, NIHSS 17. Patient was driving, pulled over due to chest pain and SOB. Neighbor called EMS after finding him on the floor with slurred speech and L sided weakness. Spoke with Wife Daiana, who saw him this morning at his baseline. He was complaining of SOB. Left hemiplegia, LHH, mild dysfluency with severe dysarthria, right gaze preference. CTH Age-indeterminate infarct in the right frontal white matter,  mL at risk ischemic tissue involving the right MCA. CTA R M1.      Admission Scores  NIHSS: 17    LKN 9:05 Am 5/18  pre mRS 0    tenecteplase given at 11:00 AM 5/18  Patient is a thrombectomy candidate, taken from ER to IR    HOSPITAL COURSE:  5/18 tenecteplase given at 11:00 AM, then s/p cerebral angiogram for R M1 occlusion mechanical thrombectomy TICI 3 with Dr. Hein, returned to ICU intubated d/t meal this morning    Allergies    No Known Allergies    Intolerances    REVIEW OF SYSTEMS: [x ] Unable to Assess due to neurologic exam   [ ] All ROS addressed below are non-contributory, except:  Neuro: [ ] Headache [ ] Back pain [ ] Numbness [ ] Weakness [ ] Ataxia [ ] Dizziness [ ] Aphasia [ ] Dysarthria [ ] Visual disturbance  Resp: [ ] Shortness of breath/dyspnea, [ ] Orthopnea [ ] Cough  CV: [ ] Chest pain [ ] Palpitation [ ] Lightheadedness [ ] Syncope  Renal: [ ] Thirst [ ] Edema  GI: [ ] Nausea [ ] Emesis [ ] Abdominal pain [ ] Constipation [ ] Diarrhea  Hem: [ ] Hematemesis [ ] bright red blood per rectum  ID: [ ] Fever [ ] Chills [ ] Dysuria  ENT: [ ] Rhinorrhea      DEVICES:   [x ] Restraints [ x] ET tube [ x] avilez  VITALS:   Vital Signs Last 24 Hrs  T(C): 35.9 (18 May 2025 12:45), Max: 37.1 (18 May 2025 10:50)  T(F): 96.7 (18 May 2025 12:45), Max: 98.7 (18 May 2025 10:50)  HR: 80 (18 May 2025 13:15) (80 - 107)  BP: 129/73 (18 May 2025 13:15) (129/73 - 172/88)  BP(mean): 95 (18 May 2025 13:15) (95 - 109)  RR: 17 (18 May 2025 13:15) (17 - 20)  SpO2: 100% (18 May 2025 13:15) (90% - 100%)    Parameters below as of 18 May 2025 11:45  Patient On (Oxygen Delivery Method): nasal cannula  O2 Flow (L/min): 4    CAPILLARY BLOOD GLUCOSE  116 (18 May 2025 11:24)      POCT Blood Glucose.: 116 mg/dL (18 May 2025 10:28)    I&O's Summary      Respiratory:  Mode: AC/ CMV (Assist Control/ Continuous Mandatory Ventilation)  RR (machine): 12  TV (machine): 520  FiO2: 40  PEEP: 6  ITime: 1.5  MAP: 11  PIP: 24        LABS:                        14.2   4.79  )-----------( 294      ( 18 May 2025 10:31 )             42.4     05-18    142  |  109[H]  |  19  ----------------------------<  122[H]  4.3   |  20[L]  |  1.11        MEDICATIONS  (STANDING):  atorvastatin 40 milliGRAM(s) Oral at bedtime  chlorhexidine 0.12% Liquid 15 milliLiter(s) Oral Mucosa every 12 hours  dexMEDEtomidine Infusion 0.2 MICROgram(s)/kG/Hr (2.83 mL/Hr) IV Continuous <Continuous>  niCARdipine Infusion 5 mG/Hr (25 mL/Hr) IV Continuous <Continuous>  nicotine -  14 mG/24Hr(s) Patch 1 Patch Transdermal daily  pantoprazole  Injectable 40 milliGRAM(s) IV Push daily  polyethylene glycol 3350 17 Gram(s) Oral two times a day  senna 2 Tablet(s) Oral at bedtime  sodium chloride 0.9%. 1000 milliLiter(s) (70 mL/Hr) IV Continuous <Continuous>  sugammadex Injectable 240 milliGRAM(s) IV Push once  tamsulosin 0.4 milliGRAM(s) Oral at bedtime    MEDICATIONS  (PRN):  acetaminophen     Tablet .. 650 milliGRAM(s) Oral every 6 hours PRN Temp greater or equal to 38.5C (101.3F), Mild Pain (1 - 3)            IMAGING:    < from: CT Brain Stroke Protocol (05.18.25 @ 10:52) >  IMPRESSION:  No acute intracranial hemorrhage, mass effect, or midline shift.    Age-indeterminate infarct in the right frontal white matter.    Please see subsequent CTA regarding right MCA occlusion.    Findings were discussed with Dr. Gerry Miller  5/18/2025 10:55 AM by Dr. Diaz with read back confirmation.    --- End of Report---     VICK DIAZ MD; Resident Radiologist  This document has been electronically signed.  DAVIDE SARAVIA MD; Attending Radiologist  This document has been electronically signed. May 18 2025 11:05AM    < end of copied text >      < from: CT Angio Brain Stroke Protocol  w/ IV Cont (05.18.25 @ 10:50) >  IMPRESSION:  CT angiogram neck:  -No vaso-occlusive disease.    CT angiogram head:  -Right MCA M1 occlusion. Relative paucity of flow related enhancement in   the distal right MCA branches. Findings were discussed with Dr. Joe Miller 5/18/2025 10:55 AM by Dr. Diaz with read back confirmation.    CT perfusion:  -144 mL at risk ischemic tissue involving the right MCA territory   diffusely.  -0 mL core infarct identified.  -Mismatch ratio: Infinite.  -Mismatch volume: 144 mL.    _____________  NASCET criteria for internal carotid artery stenosis:  Mild: 0% to 49%  Moderate: 50% to 69%  Severe: 70% to 99%  Complete Occlusion    --- End of Report ---    DAVIDE SARAVIA MD; Attending Radiologist  This document has been electronically signed. May 18 2025 11:16AM    < end of copied text >      EXAMINATION:  PHYSICAL EXAM:     Constitutional: No Acute Distress     Neurological: Intubated, no EO, PERRL (2S), no gaze preference, not FC, Right side AG/spontaneous, Left side WDs to noxious stimuli     Pulmonary: Clear to Auscultation, No rales, No rhonchi, No wheezes     Cardiovascular: Regular rate and rhythm     Gastrointestinal: Soft, Non-tender, Non-distended     Extremities: No calf tenderness     Incision: Right groin c/d/i   HPI:   54M RH man with a PMHx significant for HTN, HLD, smoker. Presenting as code stroke for Left sided weakness, NIHSS 17. Patient was driving, pulled over due to chest pain and SOB. Neighbor called EMS after finding him on the floor with slurred speech and L sided weakness. Spoke with Wife Daiana, who saw him this morning at his baseline. He was complaining of SOB. Left hemiplegia, LHH, mild dysfluency with severe dysarthria, right gaze preference. CTH Age-indeterminate infarct in the right frontal white matter,  mL at risk ischemic tissue involving the right MCA. CTA R M1.      Admission Scores  NIHSS: 17    LKN 9:05 Am 5/18  pre mRS 0    tenecteplase given at 11:00 AM 5/18  Patient is a thrombectomy candidate, taken from ER to IR    HOSPITAL COURSE:  5/18 tenecteplase given at 11:00 AM, then s/p cerebral angiogram for R M1 occlusion mechanical thrombectomy TICI 3 with Dr. Hein, returned to ICU intubated d/t meal this morning    Allergies    No Known Allergies    Intolerances    REVIEW OF SYSTEMS: [x ] Unable to Assess due to neurologic exam   [ ] All ROS addressed below are non-contributory, except:  Neuro: [ ] Headache [ ] Back pain [ ] Numbness [ ] Weakness [ ] Ataxia [ ] Dizziness [ ] Aphasia [ ] Dysarthria [ ] Visual disturbance  Resp: [ ] Shortness of breath/dyspnea, [ ] Orthopnea [ ] Cough  CV: [ ] Chest pain [ ] Palpitation [ ] Lightheadedness [ ] Syncope  Renal: [ ] Thirst [ ] Edema  GI: [ ] Nausea [ ] Emesis [ ] Abdominal pain [ ] Constipation [ ] Diarrhea  Hem: [ ] Hematemesis [ ] bright red blood per rectum  ID: [ ] Fever [ ] Chills [ ] Dysuria  ENT: [ ] Rhinorrhea      DEVICES:   [x ] Restraints [ x] ET tube [ x] avilez  VITALS:   Vital Signs Last 24 Hrs  T(C): 35.9 (18 May 2025 12:45), Max: 37.1 (18 May 2025 10:50)  T(F): 96.7 (18 May 2025 12:45), Max: 98.7 (18 May 2025 10:50)  HR: 80 (18 May 2025 13:15) (80 - 107)  BP: 129/73 (18 May 2025 13:15) (129/73 - 172/88)  BP(mean): 95 (18 May 2025 13:15) (95 - 109)  RR: 17 (18 May 2025 13:15) (17 - 20)  SpO2: 100% (18 May 2025 13:15) (90% - 100%)    Parameters below as of 18 May 2025 11:45  Patient On (Oxygen Delivery Method): nasal cannula  O2 Flow (L/min): 4    CAPILLARY BLOOD GLUCOSE  116 (18 May 2025 11:24)      POCT Blood Glucose.: 116 mg/dL (18 May 2025 10:28)    I&O's Summary      Respiratory:  Mode: AC/ CMV (Assist Control/ Continuous Mandatory Ventilation)  RR (machine): 12  TV (machine): 520  FiO2: 40  PEEP: 6  ITime: 1.5  MAP: 11  PIP: 24        LABS:                        14.2   4.79  )-----------( 294      ( 18 May 2025 10:31 )             42.4     05-18    142  |  109[H]  |  19  ----------------------------<  122[H]  4.3   |  20[L]  |  1.11        MEDICATIONS  (STANDING):  atorvastatin 40 milliGRAM(s) Oral at bedtime  chlorhexidine 0.12% Liquid 15 milliLiter(s) Oral Mucosa every 12 hours  dexMEDEtomidine Infusion 0.2 MICROgram(s)/kG/Hr (2.83 mL/Hr) IV Continuous <Continuous>  niCARdipine Infusion 5 mG/Hr (25 mL/Hr) IV Continuous <Continuous>  nicotine -  14 mG/24Hr(s) Patch 1 Patch Transdermal daily  pantoprazole  Injectable 40 milliGRAM(s) IV Push daily  polyethylene glycol 3350 17 Gram(s) Oral two times a day  senna 2 Tablet(s) Oral at bedtime  sodium chloride 0.9%. 1000 milliLiter(s) (70 mL/Hr) IV Continuous <Continuous>  sugammadex Injectable 240 milliGRAM(s) IV Push once  tamsulosin 0.4 milliGRAM(s) Oral at bedtime    MEDICATIONS  (PRN):  acetaminophen     Tablet .. 650 milliGRAM(s) Oral every 6 hours PRN Temp greater or equal to 38.5C (101.3F), Mild Pain (1 - 3)            IMAGING:    < from: CT Brain Stroke Protocol (05.18.25 @ 10:52) >  IMPRESSION:  No acute intracranial hemorrhage, mass effect, or midline shift.    Age-indeterminate infarct in the right frontal white matter.    Please see subsequent CTA regarding right MCA occlusion.    Findings were discussed with Dr. Gerry Miller  5/18/2025 10:55 AM by Dr. Diaz with read back confirmation.    --- End of Report---     VICK DIAZ MD; Resident Radiologist  This document has been electronically signed.  DAVIDE SARAVIA MD; Attending Radiologist  This document has been electronically signed. May 18 2025 11:05AM    < end of copied text >      < from: CT Angio Brain Stroke Protocol  w/ IV Cont (05.18.25 @ 10:50) >  IMPRESSION:  CT angiogram neck:  -No vaso-occlusive disease.    CT angiogram head:  -Right MCA M1 occlusion. Relative paucity of flow related enhancement in   the distal right MCA branches. Findings were discussed with Dr. Joe Miller 5/18/2025 10:55 AM by Dr. Diaz with read back confirmation.    CT perfusion:  -144 mL at risk ischemic tissue involving the right MCA territory   diffusely.  -0 mL core infarct identified.  -Mismatch ratio: Infinite.  -Mismatch volume: 144 mL.    _____________  NASCET criteria for internal carotid artery stenosis:  Mild: 0% to 49%  Moderate: 50% to 69%  Severe: 70% to 99%  Complete Occlusion    --- End of Report ---    DAVIDE SARAVIA MD; Attending Radiologist  This document has been electronically signed. May 18 2025 11:16AM    < end of copied text >      EXAMINATION:  PHYSICAL EXAM:     Constitutional: No Acute Distress     Neurological: Intubated, EOV, Ox3 w/ choices, PERRL (2S), no gaze preference, FC (wiggles toes and thumbs up), Right side AG/spontaneous, LUE 3/5, LLE 3/5 w/ assistance    Pulmonary: Clear to Auscultation, No rales, No rhonchi, No wheezes     Cardiovascular: Regular rate and rhythm     Gastrointestinal: Soft, Non-tender, Non-distended     Extremities: No calf tenderness     Incision: Right groin c/d/i

## 2025-05-18 NOTE — H&P ADULT - ASSESSMENT
54y (1970) RH man with a PMHx significant for HTN presenting as code stroke for Left sided weakness. On exam, Left hemiplegia, LHH, mild dysfluency with severe dysarthria, right gaze preference due to    LKN 9:05 Am 5/18  NIHSS 17  pre mRS 0      Impression:    Recommendation:    54y (1970) RH man with a PMHx significant for HTN presenting as code stroke for Left sided weakness. On exam, Left hemiplegia, LHH, mild dysfluency with severe dysarthria, right gaze preference. CTH Age-indeterminate infarct in the right frontal white matter,  mL at risk ischemic tissue involving the right MCA. CTA R M1.     LKN 9:05 Am 5/18  NIHSS 17  pre mRS 0    tenecteplase given at 11:00 AM 5/18  Patient is a thrombectomy candidate     Impression: Left hemiplegia, LHH, mild dysfluency with severe dysarthria, right gaze preference due to acute ischemic stroke, R M1 occlusion s/p tenecteplase, pending thrombectomy   Mechanism pending further workup     Plan:   [] NSCU after thrombectomy   [ ] Please repeat CTH in 24 hours after tenecteplase administration 5/19 11AM  [ ] MRI w/o contrast:  [ ] TTE w/ bubble study  [ ]  Basic labs: CBC,CMP, coagulation panel and troponin,  HgbA1C, lipid panel  [] Please obtain urine drug screen  [ ] Aspirin 81mg daily will be started 24h after tenecteplase if repeat CTH negative for hemorrhage.   [ ] Atorvastatin 40 mg daily (long-term goal and titrate to LDL < 70)  [ ]  Baseline EKG and CXR    After tenecteplase administration (Started at 11AM 5/18):  >Neurochecks: Every 15 mins x two  hours, then every 30 mins x6 hours, then every hour x 16 hours.   >No avilez removal or placement for 24 hours   >No venous/arterial puncture at non-compressible site   >No anticoagulation or anti-platelet agents for 24 hours   >Cardene drip as needed to keep BP < 180/105     -Continuous cardiac telemetry to monitor for arrhythmia   - Frequent neuro-checks (q15min for 4h then q1h for 24h then q4h)   - Permissive HTN up to /105 for 48 hours then gradual normotension over 2-3 days.    - Intravenous hydration with normal saline at 75cc per hour if needed   - NPO unless passess dysphagia screen. If fails, perform SLP eval    - Head of bed > 30 degrees for aspiration prevention and aspiration precautions ordered.   -Tight glucose control (long-term goal HgbA1c < 6%)   -Stroke education and counseling   -PT/OT/ SLP consults   - / CM for help with discharge when stable   -Prophylaxis: SCDs (DVT), can start medical DVT ppx after tenecteplase if repeat CTH negative for hemorrhage,     Tenecteplase accept consent: The risks and benefits of IV Tenecteplase administration was discussed with patient/family in presence of ED staff. Risks including but not limited to intracranial hemorrhage, major systemic hemorrhage and angioedema in ~6%, 2%, and 5% of patients, respectively. There is a 2.8% risk of intracerebral bleeding in patients treated in the 3-4.5 hour window (compared to 0.2% not treated with tenecteplase).  In addition, contraindications to tenecteplase were reviewed with patient and confirmed to not be present, allowing for administration of tenecteplase.     Case discussed with stroke fellow Dr. Chew undersupervision of stroke attending Dr. Castellanos.           54y (1970) RH man with a PMHx significant for HTN presenting as code stroke for Left sided weakness. On exam, Left hemiplegia, LHH, mild dysfluency with severe dysarthria, right gaze preference. CTH Age-indeterminate infarct in the right frontal white matter,  mL at risk ischemic tissue involving the right MCA. CTA R M1.     LKN 9:05 Am 5/18  NIHSS 17  pre mRS 0    tenecteplase given at 11:00 AM 5/18  Patient is a thrombectomy candidate RM1    Impression: Left hemiplegia, LHH, mild dysfluency with severe dysarthria, right gaze preference due to acute ischemic stroke, R M1 occlusion s/p tenecteplase  Mechanism pending further workup     Plan:   [] NSCU after thrombectomy   [ ] Please repeat CTH in 24 hours after tenecteplase administration 5/19 11AM  [ ] MRI w/o contrast:  [ ] TTE w/ bubble study  [ ]  Basic labs: CBC,CMP, coagulation panel and troponin,  HgbA1C, lipid panel  [] Please obtain urine drug screen  [ ] Aspirin 81mg daily will be started 24h after tenecteplase if repeat CTH negative for hemorrhage.   [ ] Atorvastatin 40 mg daily (long-term goal and titrate to LDL < 70)  [ ]  Baseline EKG and CXR    After tenecteplase administration (Started at 11AM 5/18):  >Neurochecks: Every 15 mins x two  hours, then every 30 mins x6 hours, then every hour x 16 hours.   >No avilez removal or placement for 24 hours   >No venous/arterial puncture at non-compressible site   >No anticoagulation or anti-platelet agents for 24 hours   >Cardene drip as needed to keep BP < 180/105     -Continuous cardiac telemetry to monitor for arrhythmia   - Frequent neuro-checks (q15min for 4h then q1h for 24h then q4h)   - Permissive HTN up to /105 for 48 hours then gradual normotension over 2-3 days.    - Intravenous hydration with normal saline at 75cc per hour if needed   - NPO unless passess dysphagia screen. If fails, perform SLP eval    - Head of bed > 30 degrees for aspiration prevention and aspiration precautions ordered.   -Tight glucose control (long-term goal HgbA1c < 6%)   -Stroke education and counseling   -PT/OT/ SLP consults   -SW / CM for help with discharge when stable   -Prophylaxis: SCDs (DVT), can start medical DVT ppx after tenecteplase if repeat CTH negative for hemorrhage,     Tenecteplase accept consent: The risks and benefits of IV Tenecteplase administration was discussed with patient/family in presence of ED staff. Risks including but not limited to intracranial hemorrhage, major systemic hemorrhage and angioedema in ~6%, 2%, and 5% of patients, respectively. There is a 2.8% risk of intracerebral bleeding in patients treated in the 3-4.5 hour window (compared to 0.2% not treated with tenecteplase).  In addition, contraindications to tenecteplase were reviewed with patient and confirmed to not be present, allowing for administration of tenecteplase.     Case discussed with stroke fellow Dr. Chew undersupervision of stroke attending Dr. Castellanos.

## 2025-05-18 NOTE — ED PROVIDER NOTE - SECONDARY DIAGNOSIS.
CIPHER Outreach   Question 1   General Status   Over the last week, have you developed any new or worsening symptoms, such as but not limited to,  shortness of breath, fever, fatigue, nausea, vomiting, or increased pain? If yes please press 1, if no press 2, if you're not sure please press 3, or if you're feeling better press 4. New Symptoms (Issue Panel: [place] Clinical Intervention, Issue Alert: [place] Clinical Alert)     Question 2   Follow Up Completed   Have you completed a follow-up visit with your doctor? Press 1 if you have completed a follow-up, press 2 if you have not scheduled your appointment, press 3 if you missed your appointment, press 4 if you have an appointment scheduled for a follow-up   Appointment Not Scheduled (Issue Panel: [place] Clinical Intervention, Issue Alert: [address])     Question 3   Daily Activities   Have you been able to perform your daily activities such as getting dressed or making your meals, as you normally would? Please press 1 if you have been able to perform daily activities. Press 2 if you have not been able to. Not performing Daily Activities (Issue Panel: [place] Clinical Intervention, Issue Alert: [address])    ### Required Interventions and Feedback     Call Status         Call Status:     Attempt 1 (edited by TS on 01/02/2023 10:49 AM CST), Attempt 2 (edited by TS on 01/02/2023 02:05 PM CST), Attempt 3 (edited by TS on 01/02/2023 04:03 PM CST)     Clinical Concerns - Issues         Clinical Concerns - Issues List:     Fatigue/Weakness/Dizziness (edited by TS on 01/02/2023 04:15 PM CST)    Comments:     Patient states that she is having an increase in weakness, that her MD is [facility] Patient states that she has little to no energy to do anything and no motivation. Patient states that she was told she could use some more protein in her diet, patient and writer discussed potentially adding some supplements.  Writer offered to send a message to the patients MD for their recommendations, patient agreed. (edited by TS on 01/02/2023 04:17 PM CST)     Follow Up Appointment - Issues         Follow Up Appointment - Issues List:     Other (Add Details in Comments) (edited by TS on 01/02/2023 04:17 PM CST)    Comments:     Patient has a followup with her PCP on 1/10/23 (edited by TS on 01/02/2023 04:18 PM CST)     Daily Activities - Issues          Daily Activities - Issues List:     Other (Add Details in Comments) (edited by TS on 01/02/2023 04:18 PM CST)    Comments:     Patient is having an increase in weakness. Will send a message to provider for recommendations.   (edited by TS on 01/02/2023 04:18 PM CST) LVH (left ventricular hypertrophy)

## 2025-05-18 NOTE — AIRWAY REMOVAL NOTE  ADULT & PEDS - ARTIFICAL AIRWAY REMOVAL COMMENTS
Written order for extubation verified. The patient was identified by full name and birth date compared to the identification band. Present during the procedure was ROSE MARIE Gillespie and SOL Tamez

## 2025-05-18 NOTE — H&P ADULT - HISTORY OF PRESENT ILLNESS
HPI: Patient TIFFANY SANCHEZ is a 54y (1970) RH man with a PMHx significant for HTN presenting as code stroke for Left sided weakness. Patient was driving, pulled over and sat on the floor due to chest pain and SOB. Neighbor found him and called EMS. Spoke with Wife Daiana, who saw him this morning at his baseline. He was complaining of SOB. Denies being on AC/AP. No hx stroke. Does not take any medications. At baseline, o x3, ambulates without assistance, independent of all Adls.               HPI: Patient TIFFANY SANCHEZ is a 54y (1970) RH man with a PMHx significant for HTN presenting as code stroke for Left sided weakness. Patient was driving, pulled over  due to chest pain and SOB. Neighbor called EMS after finding him on the floor with slurred speech and L sided weakness.Spoke with Wife Daiana, who saw him this morning at his baseline. He was complaining of SOB. Denies being on AC/AP. No hx stroke. Does not take any medications. At baseline, o x3, ambulates without assistance, independent of all Adls.

## 2025-05-18 NOTE — PROGRESS NOTE ADULT - ASSESSMENT
ASSESSMENT/PLAN: 54M s/p RM1 occlusion s/p mechanical thrombectomy TICI 3    NEURO:  Neuro/Vitals q 1  CTH in AM and CTH at 11AM for 24hr TNK  Aspirin 81mg daily will be started 24h after tenecteplase CTH if negative   MRI non contrast at somepoint  started atorvastatin 40mg daily  Sedation: Precedex  Pain: tylenol PRN  Activity: [] OOB as tolerated [x] Bedrest till 12hrs post TNK [] PT [] OT [] PMNR  Stroke Core Measures: pending  Right groin accessed c/d/i      PULM:  Intubated, 540/12/6/40  CXR daily  SpO2 >92%    CV:  SBP goal 100-140  Nicardipine gtt prn   TTE w/ bubble study- pending    RENAL:  Fluids: NS @70  continue home flomax 0.4mg at bedtime for hx BPH    GI:  Diet: NPO  GI prophylaxis [] not indicated [x] PPI [] other:  Bowel regimen [x] senna [] other: miralax  Last BM: PTA  S/S pending per stroke protocol     ENDO:   Goal euglycemia (-180)    HEME/ONC:  VTE prophylaxis: [x] SCDs [] chemoprophylaxis [x] hold chemoprophylaxis due to: POD 0 mechanical thrombectomy [] high risk of DVT/PE on admission due to:  LED-p     ID:  afebrile    MISC:    SOCIAL/FAMILY:  [] awaiting [x] updated at bedside [] family meeting    CODE STATUS:  [x] Full Code [] DNR [] DNI [] Palliative/Comfort Care    DISPOSITION:  [x] ICU [] Stroke Unit [] Floor [] EMU [] RCU [] PCU   ASSESSMENT/PLAN: 54M s/p RM1 occlusion s/p mechanical thrombectomy TICI 3    NEURO:  Neuro/Vitals q 1  CTH in AM and CTH at 11AM for 24hr TNK  Aspirin 81mg daily will be started 24h after tenecteplase CTH if negative   MRI non contrast at somepoint  started atorvastatin 40mg daily  Sedation: Precedex  Pain: tylenol PRN  Activity: [] OOB as tolerated [x] Bedrest till 12hrs post TNK [] PT [] OT [] PMNR  Stroke Core Measures: pending  Right groin accessed c/d/i      PULM:  Intubated, 540/12/6/40  CXR daily  SpO2 >92%, CPAP as tolerated    CV:  SBP goal 100-140  Nicardipine gtt prn   TTE w/ bubble study- pending    RENAL:  Fluids: NS @70  continue home flomax 0.4mg at bedtime for hx BPH    GI:  Diet: NPO  GI prophylaxis [] not indicated [x] PPI [] other:  Bowel regimen [x] senna [] other: miralax  Last BM: PTA  S/S pending per stroke protocol     ENDO:   Goal euglycemia (-180)    HEME/ONC:  VTE prophylaxis: [x] SCDs [] chemoprophylaxis [x] hold chemoprophylaxis due to: POD 0 mechanical thrombectomy [] high risk of DVT/PE on admission due to:  LED-p     ID:  afebrile    MISC:    SOCIAL/FAMILY:  [] awaiting [x] updated at bedside [] family meeting    CODE STATUS:  [x] Full Code [] DNR [] DNI [] Palliative/Comfort Care    DISPOSITION:  [x] ICU [] Stroke Unit [] Floor [] EMU [] RCU [] PCU   ASSESSMENT/PLAN: 54M s/p RM1 occlusion s/p mechanical thrombectomy TICI 3    NEURO:  Neuro/Vitals q 1  CTH in AM and CTH at 11AM for 24hr TNK  Aspirin 81mg daily will be started 24h after tenecteplase CTH if negative   MRI non contrast at somepoint  started atorvastatin 40mg daily  Sedation: Precedex  Pain: tylenol PRN  Activity: [] OOB as tolerated [x] Bedrest till 12hrs post TNK [] PT [] OT [] PMNR  Stroke Core Measures: pending  Right groin accessed c/d/i      PULM:   Intubated, 520/12/6/40  CXR daily  SpO2 >92%, CPAP as tolerated    CV:  SBP goal 100-140  Nicardipine gtt prn   TTE w/ bubble study- pending    RENAL:  Fluids: NS @70  continue home flomax 0.4mg at bedtime for hx BPH    GI:  Diet: NPO  GI prophylaxis [] not indicated [x] PPI [] other:  Bowel regimen [x] senna [] other: miralax  Last BM: PTA  S/S pending per stroke protocol     ENDO:   Goal euglycemia (-180)    HEME/ONC:  VTE prophylaxis: [x] SCDs [] chemoprophylaxis [x] hold chemoprophylaxis due to: POD 0 mechanical thrombectomy [] high risk of DVT/PE on admission due to:  LED-p     ID:  afebrile    MISC:    SOCIAL/FAMILY:  [] awaiting [x] updated at bedside [] family meeting    CODE STATUS:  [x] Full Code [] DNR [] DNI [] Palliative/Comfort Care    DISPOSITION:  [x] ICU [] Stroke Unit [] Floor [] EMU [] RCU [] PCU

## 2025-05-18 NOTE — ED PROVIDER NOTE - OBJECTIVE STATEMENT
53 y/o M pmhx of HTN, smoker presenting for eval of acute onset left sided weakness, slurred speech,and right gaze preference. LNK confirmed by wife dayana over the phone was 9:05am today. Per ems, pt was found on the ground outside his car approx 100 blocks away from his home by a bystander at around 1050am. pt reports sx onset at around 930. pt reports SOB x1 week, wife confirms he has been more short of breath that last week as well. pt denies headache, chest pain, abd pain, AP/AC use, recent trauma, recent surgeries/procedures. wife confirms this as well.

## 2025-05-18 NOTE — PROGRESS NOTE ADULT - NS PANP COMMENT GEN_ALL_CORE FT
s/p thrombectomy today.  stroke neurology following.  4-4+/5 LUE/LE.  plan to xfer to stroke service tomorrow.

## 2025-05-18 NOTE — CHART NOTE - NSCHARTNOTEFT_GEN_A_CORE
CAPRINI SCORE [CLOT] Score on Admission for     AGE RELATED RISK FACTORS                                                       MOBILITY RELATED FACTORS  [ x] Age 41-60 years                                            (1 Point)                  [ ] Bed rest                                                        (1 Point)  [ ] Age: 61-74 years                                           (2 Points)                 [ ] Plaster cast                                                   (2 Points)  [ ] Age= 75 years                                              (3 Points)                 [ ] Bed bound for more than 72 hours                 (2 Points)    DISEASE RELATED RISK FACTORS                                               GENDER SPECIFIC FACTORS  [ ] Edema in the lower extremities                       (1 Point)                  [ ] Pregnancy                                                     (1 Point)  [ ] Varicose veins                                               (1 Point)                  [ ] Post-partum < 6 weeks                                   (1 Point)             [ x] BMI > 25 Kg/m2                                            (1 Point)                  [ ] Hormonal therapy  or oral contraception          (1 Point)                 [ ] Sepsis (in the previous month)                        (1 Point)                  [ ] History of pregnancy complications                 (1 point)  [ ] Pneumonia or serious lung disease                                               [ ] Unexplained or recurrent                     (1 Point)           (in the previous month)                               (1 Point)  [ ] Abnormal pulmonary function test                     (1 Point)                 SURGERY RELATED RISK FACTORS (include planned surgeries)  [ ] Acute myocardial infarction                              (1 Point)                 [ ]  Section                                             (1 Point)  [ ] Congestive heart failure (in the previous month)  (1 Point)         [ ] Minor surgery                                                  (1 Point)   [ ] Inflammatory bowel disease                             (1 Point)                 [ ] Arthroscopic surgery                                        (2 Points)  [ ] Central venous access                                      (2 Points)                [ x] General surgery lasting more than 45 minutes   (2 Points)       [ x] Stroke (in the previous month)                          (5 Points)               [ ] Elective arthroplasty                                         (5 Points)            [ ] current or past malignancy                              (2 Points)                                                                                                       HEMATOLOGY RELATED FACTORS                                                 TRAUMA RELATED RISK FACTORS  [ ] Prior episodes of VTE                                     (3 Points)                [ ] Fracture of the hip, pelvis, or leg                       (5 Points)  [ ] Positive family history for VTE                         (3 Points)                 [ ] Acute spinal cord injury (in the previous month)  (5 Points)  [ ] Prothrombin 88919 A                                     (3 Points)                 [ ] Paralysis  (less than 1 month)                             (5 Points)  [ ] Factor V Leiden                                             (3 Points)                  [ ] Multiple Trauma within 1 month                        (5 Points)  [ ] Lupus anticoagulants                                     (3 Points)                                                           [ ] Anticardiolipin antibodies                               (3 Points)                                                       [ ] High homocysteine in the blood                      (3 Points)                                             [ ] Other congenital or acquired thrombophilia      (3 Points)                                                [ ] Heparin induced thrombocytopenia                  (3 Points)                                          Total Score [      9    ]    Risk:  Very low 0   Low 1 to 2   Moderate 3 to 4   High =5       VTE Prophylasix Recommednations:  [x ] mechanical pneumatic compression devices                                      [ ] contraindicated: _____________________  [ ] chemo prophylasix                                                                                   [ x] contraindicated _____________________    **** HIGH LIKELIHOOD DVT PRESENT ON ADMISSION  [ x] (please order LE dopplers within 24 hours of admission)

## 2025-05-18 NOTE — ED PROVIDER NOTE - PROGRESS NOTE DETAILS
Nellie Knowles MD, PGY3:  pt to go to thrombectomy. Nellie Knowles MD, PGY3:  pt to go to thrombectomy. tte and cxr ordered, neuro team aware, to be completed in the NSCU

## 2025-05-18 NOTE — PATIENT PROFILE ADULT - HEALTH LITERACY UNDERSTANDING DOCTOR- DETAILS
Patient's spouse states that patient likes having family members at doctor appointments to help understand

## 2025-05-18 NOTE — ED ADULT NURSE NOTE - NSFALLRISKINTERV_ED_ALL_ED

## 2025-05-19 ENCOUNTER — RESULT REVIEW (OUTPATIENT)
Age: 55
End: 2025-05-19

## 2025-05-19 LAB
MRSA PCR RESULT.: SIGNIFICANT CHANGE UP
S AUREUS DNA NOSE QL NAA+PROBE: SIGNIFICANT CHANGE UP

## 2025-05-19 PROCEDURE — 70450 CT HEAD/BRAIN W/O DYE: CPT | Mod: 26

## 2025-05-19 PROCEDURE — 99232 SBSQ HOSP IP/OBS MODERATE 35: CPT | Mod: 24,25

## 2025-05-19 PROCEDURE — 93306 TTE W/DOPPLER COMPLETE: CPT | Mod: 26

## 2025-05-19 PROCEDURE — 74177 CT ABD & PELVIS W/CONTRAST: CPT | Mod: 26

## 2025-05-19 PROCEDURE — 99223 1ST HOSP IP/OBS HIGH 75: CPT

## 2025-05-19 PROCEDURE — 71275 CT ANGIOGRAPHY CHEST: CPT | Mod: 26

## 2025-05-19 RX ORDER — ENOXAPARIN SODIUM 100 MG/ML
40 INJECTION SUBCUTANEOUS
Refills: 0 | Status: DISCONTINUED | OUTPATIENT
Start: 2025-05-19 | End: 2025-05-20

## 2025-05-19 RX ORDER — ASPIRIN 325 MG
81 TABLET ORAL DAILY
Refills: 0 | Status: DISCONTINUED | OUTPATIENT
Start: 2025-05-19 | End: 2025-05-20

## 2025-05-19 RX ORDER — ATORVASTATIN CALCIUM 80 MG/1
80 TABLET, FILM COATED ORAL AT BEDTIME
Refills: 0 | Status: DISCONTINUED | OUTPATIENT
Start: 2025-05-19 | End: 2025-05-23

## 2025-05-19 RX ADMIN — NICOTINE POLACRILEX 1 PATCH: 4 GUM, CHEWING ORAL at 17:00

## 2025-05-19 RX ADMIN — TAMSULOSIN HYDROCHLORIDE 0.4 MILLIGRAM(S): 0.4 CAPSULE ORAL at 22:38

## 2025-05-19 RX ADMIN — ATORVASTATIN CALCIUM 80 MILLIGRAM(S): 80 TABLET, FILM COATED ORAL at 22:39

## 2025-05-19 RX ADMIN — POLYETHYLENE GLYCOL 3350 17 GRAM(S): 17 POWDER, FOR SOLUTION ORAL at 05:18

## 2025-05-19 RX ADMIN — Medication 2 TABLET(S): at 22:38

## 2025-05-19 RX ADMIN — ENOXAPARIN SODIUM 40 MILLIGRAM(S): 100 INJECTION SUBCUTANEOUS at 17:01

## 2025-05-19 RX ADMIN — NICOTINE POLACRILEX 1 PATCH: 4 GUM, CHEWING ORAL at 19:49

## 2025-05-19 RX ADMIN — POLYETHYLENE GLYCOL 3350 17 GRAM(S): 17 POWDER, FOR SOLUTION ORAL at 17:01

## 2025-05-19 RX ADMIN — Medication 40 MILLIGRAM(S): at 11:51

## 2025-05-19 RX ADMIN — NICOTINE POLACRILEX 1 PATCH: 4 GUM, CHEWING ORAL at 07:31

## 2025-05-19 RX ADMIN — Medication 81 MILLIGRAM(S): at 13:07

## 2025-05-19 NOTE — SPEECH LANGUAGE PATHOLOGY EVALUATION - DISCOURSE
06/28/21 1630   Provider Notification   Provider Name/Title Dr. Kim   Method of Notification In Department   Notification Reason Uterine Activity   MD updated: Patient sheila every 1-2 minutes over past 30 minutes. Plan to hold oral cytotec and check cervix at 1700.    intact

## 2025-05-19 NOTE — PHYSICAL THERAPY INITIAL EVALUATION ADULT - PATIENT/FAMILY AGREES WITH PLAN
Chemotherapy Administration    Pre-assessment Data: Antineoplastic Agents  See toxicity flow sheet for assessment                                          [x]         Interventions:   Chemotherapy SQ injection given []   Taxol administered-VS per protocol []   Blood pressure meds held 12 hours prior to Rituxan/Ruxience []   Rituxan/Ruxience administered- VS and precautions per guidelines []   Emergency drugs available as appropriate [x]   Anaphylaxis assessment completed [x]   Pre-medications administered as ordered [x]   Blood return noted upon initiation of chemotherapy [x]   Blood return noted each 1-2ml of a vesicant medication if given IV push []   Navelbine, Vincristine and Velban given as a monitored wide open drip, blood return noted before during and after infusion. []   Blood return noted each 2-3ml of a non-vesicant medication if given IV push []   Patient aware of potential Immunotherapy toxicities []   Monitor for signs / symptoms of hypersensitivity reaction [x]   Chemotherapy orders (drug/dose/rate) verified by 2 Chemo certified RN’s [x]   Monitor IV site and blood return throughout the infusion of the medication [x]   Document IV site checks on the IV assessment form [x]   Document chemotherapy teaching on the Patient Education tab [x]   Document patient verbalizes understanding of medications being administered [x]   If IV infiltration, see ONS Guidelines []   Other:      []      yes

## 2025-05-19 NOTE — OCCUPATIONAL THERAPY INITIAL EVALUATION ADULT - ADDITIONAL COMMENTS
PTA pt reports he resides with his wife and step son in a . no cathy, 1 fos to bedroom, +landreail on left. Tub in bathroom,  pt was independent in all adl's and functional mobility without the use of AD/AE

## 2025-05-19 NOTE — PHYSICAL THERAPY INITIAL EVALUATION ADULT - GENERAL OBSERVATIONS, REHAB EVAL
Patient received semi reclined in bed in NSCU, NAD, VSS, +PIV capped, +ICU monitors, +condom catheter, room air

## 2025-05-19 NOTE — PROGRESS NOTE ADULT - ASSESSMENT
ASSESSMENT/PLAN: 54M s/p RM1 occlusion s/p mechanical thrombectomy TICI 3    NEURO:  Neuro/Vitals q 1  CTH in AM and CTH at 11AM for 24hr TNK  Aspirin 81mg daily will be started 24h after tenecteplase CTH if negative   MRI non contrast at somepoint  Pain: tylenol PRN  Activity: [] OOB as tolerated [x] Bedrest till 12hrs post TNK [] PT [] OT [] PMNR  Stroke Core Measures: pending  Right groin accessed c/d/i      PULM:   RA  SpO2 >92%  Incentive spirometry    CV:  SBP goal 100-140  Nicardipine gtt prn  started atorvastatin 40mg daily  TTE w/ bubble study- pending  EKG    RENAL:  Fluids: NS @70  continue home flomax 0.4mg at bedtime for hx BPH  Monitor IOs   Replete lytes PRN     GI:  Diet: NPO  GI prophylaxis [] not indicated [x] PPI [] other:  Bowel regimen [x] senna [] other: miralax  Last BM: PTA  S/S pending per stroke protocol     ENDO:   Goal euglycemia (-180)    HEME/ONC:  VTE prophylaxis: [x] SCDs [] chemoprophylaxis [x] hold chemoprophylaxis due to: POD 0 mechanical thrombectomy [] high risk of DVT/PE on admission due to:  5/17 LED: + L soleal DVT, consult vascular cards in AM   Rpt LED in 5 days    ID:  afebrile  No leukocytosis   nontoxic appearing    MISC:    SOCIAL/FAMILY:  [] awaiting [x] updated at bedside [] family meeting    CODE STATUS:  [x] Full Code [] DNR [] DNI [] Palliative/Comfort Care    DISPOSITION:  [x] ICU [] Stroke Unit [] Floor [] EMU [] RCU [] PCU   ASSESSMENT/PLAN: 54M s/p RM1 occlusion s/p mechanical thrombectomy TICI 3    Stroke etiology: cardioembolic versus hypercoagulable state    NEURO:  Neuro/Vitals q 2  CTH in AM and CTH at 11AM for 24hr TNK  MRI wo when able   Aspirin 81mg daily to start now CTH no acute hemorrhage   LDL 96 atorvastatin 80   A1c 5.6   MRI non contrast at somepoint  Pain: tylenol PRN  Activity: PT OT       PULM:   RA  SpO2 >92%  Incentive spirometry    CV:  SBP goal 100-160  started atorvastatin 80mg daily  TTE w/ bubble study- pending  EKG    RENAL:  IVL   continue home flomax 0.4mg at bedtime for hx BPH  Monitor IOs   Replete lytes PRN     GI:  Diet: regular diet  GI prophylaxis [] not indicated [x] PPI [] other:  Bowel regimen [x] senna [] other: miralax  Last BM: PTA    ENDO:   Goal euglycemia (-180)    HEME/ONC:  VTE prophylaxis: [x] SCDs chemoprophylaxis tonight [] high risk of DVT/PE on admission due to:  5/17 LED: + L soleal DVT, consult vascular cards in AM   Rpt LED in 5 days    ID:  afebrile    CODE STATUS:  [x] Full Code [] DNR [] DNI [] Palliative/Comfort Care    DISPOSITION:  Stroke unit

## 2025-05-19 NOTE — PHYSICAL THERAPY INITIAL EVALUATION ADULT - PERTINENT HX OF CURRENT PROBLEM, REHAB EVAL
54M RH man with a PMHx significant for HTN, HLD, smoker. Presenting as code stroke for Left sided weakness, NIHSS 17. Patient was driving, pulled over due to chest pain and SOB. Neighbor called EMS after finding him on the floor with slurred speech and L sided weakness. Spoke with Wife Daiana, who saw him this morning at his baseline. He was complaining of SOB. Left hemiplegia, LHH, mild dysfluency with severe dysarthria, right gaze preference. CTH Age-indeterminate infarct in the right frontal white matter,  mL at risk ischemic tissue involving the right MCA. CTA R M1.  5/18/25: CTA head: There is occlusion of the right MCA M1 segment   CTA neck: CT angiogram neck: -No vaso-occlusive disease.  CT brain stroke: No acute intracranial hemorrhage, mass effect, or midline shift. Age-indeterminate infarct in the right frontal white matter. Please see subsequent CTA regarding right MCA occlusion.  IR s/p TICI 3 recanalization 54M RH man with a PMHx significant for HTN, HLD, smoker. Presenting as code stroke for Left sided weakness, NIHSS 17. Patient was driving, pulled over due to chest pain and SOB. Neighbor called EMS after finding him on the floor with slurred speech and L sided weakness. Spoke with Wife Daiana, who saw him this morning at his baseline. He was complaining of SOB. Left hemiplegia, LHH, mild dysfluency with severe dysarthria, right gaze preference. CTH Age-indeterminate infarct in the right frontal white matter,  mL at risk ischemic tissue involving the right MCA. CTA R M1. Tenecteplase given at 11:00 AM 5/18, IR s/p TICI 3 recanalization    5/18/25: CTA head: There is occlusion of the right MCA M1 segment   CTA neck: CT angiogram neck: -No vaso-occlusive disease.  CT brain stroke: No acute intracranial hemorrhage, mass effect, or midline shift. Age-indeterminate infarct in the right frontal white matter. Please see subsequent CTA regarding right MCA occlusion.  tenecteplase given at 11:00 AM 5/18  IR s/p TICI 3 recanalization 54M RH man with a PMHx significant for HTN, HLD, smoker. Presenting as code stroke for Left sided weakness, NIHSS 17. Patient was driving, pulled over due to chest pain and SOB. Neighbor called EMS after finding him on the floor with slurred speech and L sided weakness. Spoke with Wife Daiana, who saw him this morning at his baseline. He was complaining of SOB. Left hemiplegia, LHH, mild dysfluency with severe dysarthria, right gaze preference. CTH Age-indeterminate infarct in the right frontal white matter,  mL at risk ischemic tissue involving the right MCA. CTA R M1. Tenecteplase given at 11:00 AM 5/18, IR s/p TICI 3 recanalization    5/18/25: CTA head: There is occlusion of the right MCA M1 segment   CTA neck: CT angiogram neck: -No vaso-occlusive disease.  CT brain stroke: No acute intracranial hemorrhage, mass effect, or midline shift. Age-indeterminate infarct in the right frontal white matter. Please see subsequent CTA regarding right MCA occlusion.  Bilateral LE US: Acute DVT left soleal vein.  tenecteplase given at 11:00 AM 5/18  IR s/p TICI 3 recanalization

## 2025-05-19 NOTE — PHYSICAL THERAPY INITIAL EVALUATION ADULT - PLANNED THERAPY INTERVENTIONS, PT EVAL
stair negotiation GOAL: patient will negotiate up / down 10 steps independently in 2 weeks/balance training/bed mobility training/gait training/strengthening/transfer training

## 2025-05-19 NOTE — CHART NOTE - NSCHARTNOTEFT_GEN_A_CORE
***Primary Vascular Neurology Chart Note***    Patient seen today with attending and team on rounds. Please also refer to attending addendum on initial admission note dated 5/18.    Exam improved.     Impression:     Plan:  [] ANTITHROMBOTIC THERAPY: ASA 81 mg daily  [] MRI B w.o  [] TTE w/ bubble  [] Likely needs JACEK  [] CT CAP with PE protocol  [] Hypercoagulable panel   [] HgbA1C 5.6, LDL 96  [] Atorvastatin 80 mg daily  [] Physical therapy/OT/Speech Language    SBP goal: <180/105    Other:    Case & Plan discussed with patient and family. All questions answered.    CORE MEASURES:         Admission NIHSS: 17     Tenecteplase: [x] YES [] NO     Statin Therapy: [x] YES [] NO     Smoking [x] YES [] NO     Afib [] YES [x] NO     Stroke Education [x] YES [] NO    Dysphagia screen : [] Passed [] Failed- S&S pending [x] Pending    DVT ppx: Venodynes [] Heparin s.c [x] LMWH

## 2025-05-19 NOTE — SPEECH LANGUAGE PATHOLOGY EVALUATION - SLP DIAGNOSIS
Patient presents with intact expressive and receptive language skills. No evidence of dysarthria, dysphonia, or cognitive impairment.

## 2025-05-19 NOTE — OCCUPATIONAL THERAPY INITIAL EVALUATION ADULT - ASR WT BEARING STATUS EVAL
Xray of the knee unremarkable. Aman Wells to follow up with physical therapy and orthopedics as discussed during office visit  Aman Wells to notify office with any additional questions or concerns  no weight-bearing restrictions

## 2025-05-19 NOTE — OCCUPATIONAL THERAPY INITIAL EVALUATION ADULT - RANGE OF MOTION EXAMINATION, UPPER EXTREMITY
Left UE Active Assistive ROM was WNL (within normal limits)/Right UE Active ROM was WFL (within functional limits)

## 2025-05-19 NOTE — CONSULT NOTE ADULT - SUBJECTIVE AND OBJECTIVE BOX
Vascular Cardiology Consult Note    DIRECT SERVICE NUMBER:  [phone]                 CC:  DVT    HPI:     HPI:  HPI: Patient TIFFANY SANCHEZ is a 54y (1970) RH man with a PMHx significant for HTN presenting as code stroke for Left sided weakness. Patient was driving, pulled over  due to chest pain and SOB. Neighbor called EMS after finding him on the floor with slurred speech and L sided weakness.Spoke with Wife Daiana, who saw him this morning at his baseline. He was complaining of SOB. Denies being on AC/AP. No hx stroke. Does not take any medications. At baseline, o x3, ambulates without assistance, independent of all Adls.    Found to have a soleal vein DVT on the right.  Never on a/c.  Patient denies prior history of VTE.            (18 May 2025 11:27)      Allergies    No Known Allergies    Intolerances    	    MEDICATIONS:        acetaminophen     Tablet .. 650 milliGRAM(s) Oral every 6 hours PRN    pantoprazole  Injectable 40 milliGRAM(s) IV Push daily  polyethylene glycol 3350 17 Gram(s) Oral two times a day  senna 2 Tablet(s) Oral at bedtime    atorvastatin 40 milliGRAM(s) Oral at bedtime    chlorhexidine 4% Liquid 1 Application(s) Topical at bedtime  tamsulosin 0.4 milliGRAM(s) Oral at bedtime      PAST MEDICAL & SURGICAL HISTORY:  HTN (hypertension)      No significant past surgical history          FAMILY HISTORY:  No pertinent family history in first degree relatives        SOCIAL HISTORY:  unchanged    REVIEW OF SYSTEMS:    CONSTITUTIONAL: No fever, weight loss, or fatigue  EYES: No eye pain, visual disturbances, or discharge  ENT:  No difficulty hearing, tinnitus, vertigo; No sinus or throat pain  NECK: No pain or stiffness  RESPIRATORY:  No shortness of breath   CARDIOVASCULAR:  No Chest pain   GASTROINTESTINAL: No abdominal or epigastric pain. No nausea, vomiting, or hematemesis; No diarrhea or constipation. No melena or hematochezia.  GENITOURINARY: No dysuria, frequency, hematuria, or incontinence  NEUROLOGICAL: No headaches, memory loss, loss of strength, numbness, or tremors  LYMPH Nodes: No enlarged glands  ENDOCRINE: No heat or cold intolerance; No hair loss  MUSCULOSKELETAL: No joint pain or swelling; No muscle, back, or extremity pain  PSYCHIATRIC: No depression, anxiety, mood swings, or difficulty sleeping  HEME/LYMPH: No easy bruising, or bleeding gums  ALLERGY AND IMMUNOLOGIC: No hives or eczema	    [ x] All others negative	  [ ] Unable to obtain    PHYSICAL EXAM:  T(C): 37 (05-19-25 @ 07:00), Max: 37.1 (05-18-25 @ 10:50)  HR: 103 (05-19-25 @ 09:00) (68 - 107)  BP: 156/99 (05-19-25 @ 09:00) (95/64 - 172/88)  RR: 20 (05-19-25 @ 09:00) (12 - 26)  SpO2: 99% (05-19-25 @ 09:00) (90% - 100%)  Wt(kg): --  I&O's Summary    18 May 2025 07:01  -  19 May 2025 07:00  --------------------------------------------------------  IN: 1079.4 mL / OUT: 1050 mL / NET: 29.4 mL    19 May 2025 07:01  -  19 May 2025 09:41  --------------------------------------------------------  IN: 200 mL / OUT: 0 mL / NET: 200 mL        Appearance:  	  HEENT:   Normal oral mucosa, PERRL, EOMI	  Lymphatic: No lymphadenopathy  Cardiovascular:  S1S2 RRR   Respiratory:  	Clear  Psychiatry:  Alert and oriented  Gastrointestinal:  Soft, Non-tender, + BS	  Skin: No rashes, No ecchymoses, No cyanosis	           LABS:	 	    CBC Full  -  ( 18 May 2025 13:49 )  WBC Count : 7.96 K/uL  Hemoglobin : 13.8 g/dL  Hematocrit : 42.0 %  Platelet Count - Automated : 260 K/uL  Mean Cell Volume : 91.1 fl  Mean Cell Hemoglobin : 29.9 pg  Mean Cell Hemoglobin Concentration : 32.9 g/dL  Auto Neutrophil # : x  Auto Lymphocyte # : x  Auto Monocyte # : x  Auto Eosinophil # : x  Auto Basophil # : x  Auto Neutrophil % : x  Auto Lymphocyte % : x  Auto Monocyte % : x  Auto Eosinophil % : x  Auto Basophil % : x    05-18    141  |  108  |  16  ----------------------------<  124[H]  4.0   |  19[L]  |  0.92  05-18    142  |  109[H]  |  19  ----------------------------<  122[H]  4.3   |  20[L]  |  1.11    Ca    8.5      18 May 2025 13:49  Ca    9.3      18 May 2025 10:31  Phos  3.1     05-18  Mg     2.0     05-18    TPro  6.6  /  Alb  3.7  /  TBili  0.4  /  DBili  x   /  AST  22  /  ALT  22  /  AlkPhos  86  05-18  TPro  7.0  /  Alb  4.0  /  TBili  0.4  /  DBili  x   /  AST  23  /  ALT  25  /  AlkPhos  96  05-18          Assessment:  1. L soleal DVT  2.  Stroke           Plan:  1. Start prophylaxis when ok with surgical team  2.  Echo to eval for LV thrombus or PFO  3.  Would consider a CTPA to eval for PE.      Thank you      Vascular Cardiology Service      [place]     Please call with any questions:   DIRECT SERVICE NUMBER:  [phone]

## 2025-05-19 NOTE — PHYSICAL THERAPY INITIAL EVALUATION ADULT - ADDITIONAL COMMENTS
Patient lives in private home with spouse, no stairs to enter, 1 flight to bedroom. Patient independent with ADLs/IADLs, +works, +drives, owns no DME

## 2025-05-19 NOTE — PROGRESS NOTE ADULT - SUBJECTIVE AND OBJECTIVE BOX
HPI:   54M RH man with a PMHx significant for HTN, HLD, smoker. Presenting as code stroke for Left sided weakness, NIHSS 17. Patient was driving, pulled over due to chest pain and SOB. Neighbor called EMS after finding him on the floor with slurred speech and L sided weakness. Spoke with Wife Daiana, who saw him this morning at his baseline. He was complaining of SOB. Left hemiplegia, LHH, mild dysfluency with severe dysarthria, right gaze preference. CTH Age-indeterminate infarct in the right frontal white matter,  mL at risk ischemic tissue involving the right MCA. CTA R M1.      Admission Scores  NIHSS: 17    LKN 9:05 Am 5/18  pre mRS 0    tenecteplase given at 11:00 AM 5/18  Patient is a thrombectomy candidate, taken from ER to IR    HOSPITAL COURSE:  5/18 tenecteplase given at 11:00 AM, then s/p cerebral angiogram for R M1 occlusion mechanical thrombectomy TICI 3 with Dr. Hein, returned to ICU intubated d/t meal this morning, extubated, LED: + L soleal DVT     Allergies    No Known Allergies    Intolerances    REVIEW OF SYSTEMS: [x ] Unable to Assess due to neurologic exam   [ ] All ROS addressed below are non-contributory, except:  Neuro: [ ] Headache [ ] Back pain [ ] Numbness [ ] Weakness [ ] Ataxia [ ] Dizziness [ ] Aphasia [ ] Dysarthria [ ] Visual disturbance  Resp: [ ] Shortness of breath/dyspnea, [ ] Orthopnea [ ] Cough  CV: [ ] Chest pain [ ] Palpitation [ ] Lightheadedness [ ] Syncope  Renal: [ ] Thirst [ ] Edema  GI: [ ] Nausea [ ] Emesis [ ] Abdominal pain [ ] Constipation [ ] Diarrhea  Hem: [ ] Hematemesis [ ] bright red blood per rectum  ID: [ ] Fever [ ] Chills [ ] Dysuria  ENT: [ ] Rhinorrhea      EXAMINATION:  PHYSICAL EXAM:     Constitutional: No Acute Distress     Neurological: AOx3, PERRL, EOMI, no gaze preference, L facial, LUE drift, LUE 4/5, LLE 4+/5, Right side 5/5     Pulmonary: Clear to Auscultation, No rales, No rhonchi, No wheezes     Cardiovascular: Regular rate and rhythm     Gastrointestinal: Soft, Non-tender, Non-distended     Extremities: No calf tenderness     Incision: Right groin c/d/i    VITALS:   Vital Signs Last 24 Hrs  T(C): 36.6 (19 May 2025 03:00), Max: 37.1 (18 May 2025 10:50)  T(F): 97.9 (19 May 2025 03:00), Max: 98.7 (18 May 2025 10:50)  HR: 81 (19 May 2025 06:00) (68 - 107)  BP: 142/92 (19 May 2025 06:00) (95/64 - 172/88)  BP(mean): 113 (19 May 2025 06:00) (76 - 113)  RR: 20 (19 May 2025 06:00) (12 - 26)  SpO2: 97% (19 May 2025 06:00) (90% - 100%)    Parameters below as of 18 May 2025 19:00  Patient On (Oxygen Delivery Method): room air        Drips     Respiratory:  Mode: CPAP with PS  FiO2: 40  PEEP: 6  PS: 5  MAP: 8  PIP: 12    ABG - ( 18 May 2025 15:26 )  pH, Arterial: 7.37  pH, Blood: x     /  pCO2: 39    /  pO2: 167   / HCO3: 22    / Base Excess: -2.5  /  SaO2: 99.7                LABS:                        13.8   7.96  )-----------( 260      ( 18 May 2025 13:49 )             42.0     05-18    141  |  108  |  16  ----------------------------<  124[H]  4.0   |  19[L]  |  0.92      CAPILLARY BLOOD GLUCOSE  116 (18 May 2025 11:24)      POCT Blood Glucose.: 116 mg/dL (18 May 2025 10:28)      I&O's Summary    18 May 2025 07:01  -  19 May 2025 07:00  --------------------------------------------------------  IN: 1079.4 mL / OUT: 1050 mL / NET: 29.4 mL        Imaging   CXR     EKG     MEDICATION LEVELS:     IVF FLUIDS/MEDICATIONS:   MEDICATIONS  (STANDING):  atorvastatin 40 milliGRAM(s) Oral at bedtime  chlorhexidine 4% Liquid 1 Application(s) Topical at bedtime  nicotine -  14 mG/24Hr(s) Patch 1 Patch Transdermal daily  pantoprazole  Injectable 40 milliGRAM(s) IV Push daily  polyethylene glycol 3350 17 Gram(s) Oral two times a day  senna 2 Tablet(s) Oral at bedtime  tamsulosin 0.4 milliGRAM(s) Oral at bedtime    MEDICATIONS  (PRN):  acetaminophen     Tablet .. 650 milliGRAM(s) Oral every 6 hours PRN Temp greater or equal to 38.5C (101.3F), Mild Pain (1 - 3)       HPI:   54M RH man with a PMHx significant for HTN, HLD, smoker. Presenting as code stroke for Left sided weakness, NIHSS 17. Patient was driving, pulled over due to chest pain and SOB. Neighbor called EMS after finding him on the floor with slurred speech and L sided weakness. Spoke with Wife Daiana, who saw him this morning at his baseline. He was complaining of SOB. Left hemiplegia, LHH, mild dysfluency with severe dysarthria, right gaze preference. CTH Age-indeterminate infarct in the right frontal white matter,  mL at risk ischemic tissue involving the right MCA. CTA R M1.      Admission Scores  NIHSS: 17    LKN 9:05 Am 5/18  pre mRS 0  5/18 s/p TNK s/p MT TICI3     12h events: [facility] COURSE:  5/18 tenecteplase given at 11:00 AM, then s/p cerebral angiogram for R M1 occlusion mechanical thrombectomy TICI 3 with Dr. Hein, returned to ICU intubated d/t meal this morning, extubated, LED: + L soleal DVT     Allergies    No Known Allergies    Intolerances    REVIEW OF SYSTEMS: [x ] Unable to Assess due to neurologic exam   [ ] All ROS addressed below are non-contributory, except:  Neuro: [ ] Headache [ ] Back pain [ ] Numbness [ ] Weakness [ ] Ataxia [ ] Dizziness [ ] Aphasia [ ] Dysarthria [ ] Visual disturbance  Resp: [ ] Shortness of breath/dyspnea, [ ] Orthopnea [ ] Cough  CV: [ ] Chest pain [ ] Palpitation [ ] Lightheadedness [ ] Syncope  Renal: [ ] Thirst [ ] Edema  GI: [ ] Nausea [ ] Emesis [ ] Abdominal pain [ ] Constipation [ ] Diarrhea  Hem: [ ] Hematemesis [ ] bright red blood per rectum  ID: [ ] Fever [ ] Chills [ ] Dysuria  ENT: [ ] Rhinorrhea      EXAMINATION:  PHYSICAL EXAM:     Constitutional: No Acute Distress     Neurological: AOx3, PERRL, EOMI, no gaze preference, L facial, LUE drift, LUE 4/5, LLE 4+/5, Right side 5/5     Pulmonary: Clear to Auscultation, No rales, No rhonchi, No wheezes     Cardiovascular: Regular rate and rhythm     Gastrointestinal: Soft, Non-tender, Non-distended     Extremities: No calf tenderness     Incision: Right groin c/d/i    VITALS:   Vital Signs Last 24 Hrs  T(C): 36.9 (19 May 2025 11:00), Max: 37.1 (18 May 2025 19:00)  T(F): 98.4 (19 May 2025 11:00), Max: 98.7 (18 May 2025 19:00)  HR: 92 (19 May 2025 11:00) (68 - 103)  BP: 140/92 (19 May 2025 11:00) (95/64 - 156/99)  BP(mean): 111 (19 May 2025 11:00) (76 - 121)  RR: 18 (19 May 2025 11:00) (12 - 26)  SpO2: 97% (19 May 2025 11:00) (96% - 100%)    Parameters below as of 19 May 2025 11:00  Patient On (Oxygen Delivery Method): room air        Drips     Respiratory:  Mode: CPAP with PS  FiO2: 40  PEEP: 6  PS: 5  MAP: 8  PIP: 12    ABG - ( 18 May 2025 15:26 )  pH, Arterial: 7.37  pH, Blood: x     /  pCO2: 39    /  pO2: 167   / HCO3: 22    / Base Excess: -2.5  /  SaO2: 99.7                LABS:                        13.8   7.96  )-----------( 260      ( 18 May 2025 13:49 )             42.0     05-18    141  |  108  |  16  ----------------------------<  124[H]  4.0   |  19[L]  |  0.92      CAPILLARY BLOOD GLUCOSE          I&O's Summary    18 May 2025 07:01  -  19 May 2025 07:00  --------------------------------------------------------  IN: 1079.4 mL / OUT: 1050 mL / NET: 29.4 mL    19 May 2025 07:01  -  19 May 2025 12:01  --------------------------------------------------------  IN: 450 mL / OUT: 0 mL / NET: 450 mL        Imaging   CXR     EKG     MEDICATION LEVELS:     IVF FLUIDS/MEDICATIONS:   MEDICATIONS  (STANDING):  atorvastatin 80 milliGRAM(s) Oral at bedtime  nicotine -  14 mG/24Hr(s) Patch 1 Patch Transdermal daily  pantoprazole  Injectable 40 milliGRAM(s) IV Push daily  polyethylene glycol 3350 17 Gram(s) Oral two times a day  senna 2 Tablet(s) Oral at bedtime  tamsulosin 0.4 milliGRAM(s) Oral at bedtime    MEDICATIONS  (PRN):  acetaminophen     Tablet .. 650 milliGRAM(s) Oral every 6 hours PRN Temp greater or equal to 38.5C (101.3F), Mild Pain (1 - 3)

## 2025-05-19 NOTE — OCCUPATIONAL THERAPY INITIAL EVALUATION ADULT - PERTINENT HX OF CURRENT PROBLEM, REHAB EVAL
54M RH man with a PMHx significant for HTN, HLD, smoker. Presenting as code stroke for Left sided weakness, NIHSS 17. Patient was driving, pulled over due to chest pain and SOB. Neighbor called EMS after finding him on the floor with slurred speech and L sided weakness. Spoke with Wife Daiana, who saw him this morning at his baseline. He was complaining of SOB. Left hemiplegia, LHH, mild dysfluency with severe dysarthria, right gaze preference. CTH Age-indeterminate infarct in the right frontal white matter,  mL at risk ischemic tissue involving the right MCA. CTA R M1.  5/18/25: CTA head: There is occlusion of the right MCA M1 segment   CTA neck: CT angiogram neck: -No vaso-occlusive disease.  CT brain stroke: No acute intracranial hemorrhage, mass effect, or midline shift. Age-indeterminate infarct in the right frontal white matter. Please see subsequent CTA regarding right MCA occlusion.  IR s/p TICI 3 recanalization

## 2025-05-19 NOTE — PHYSICAL THERAPY INITIAL EVALUATION ADULT - NSPTDMEREC_GEN_A_CORE
Pt will require a rolling walker due to decreased strength and balance limiting safe functional mobility to help complete MRADLs/rolling walker

## 2025-05-19 NOTE — SPEECH LANGUAGE PATHOLOGY EVALUATION - SLP PERTINENT HISTORY OF CURRENT PROBLEM
54y (1970) RH man with a PMHx significant for HTN presenting as code stroke for Left sided weakness. Patient was driving, pulled over  due to chest pain and SOB. Neighbor called EMS after finding him on the floor with slurred speech and L sided weakness.Spoke with Wife Daiana, who saw him this morning at his baseline. He was complaining of SOB. Denies being on AC/AP. No hx stroke. Does not take any medications. At baseline, o x3, ambulates without assistance, independent of all Adls. On exam, Left hemiplegia, LHH, mild dysfluency with severe dysarthria, right gaze preference. CTH Age-indeterminate infarct in the right frontal white matter,  mL at risk ischemic tissue involving the right MCA. CTA R M1. 5/18 tenecteplase given at 11:00 AM, then s/p cerebral angiogram for R M1 occlusion mechanical thrombectomy TICI 3 - extubated that evening.

## 2025-05-19 NOTE — OCCUPATIONAL THERAPY INITIAL EVALUATION ADULT - BALANCE TRAINING, PT EVAL
GOAL: Patient will increase dynamic sitting/standing balance by 1/2 grade to facilitate increased ability to perform ADLs and functional mobility

## 2025-05-19 NOTE — CHART NOTE - NSCHARTNOTEFT_GEN_A_CORE
Spoke with stroke PA at [id number]    Exam: EOS, Ox3, PERRL, mild Lt facial, FC, mild LUE drift, LUE 4+/5, LLE 4/5 (had fall prior to hospital), Rt side 5/5     - 24hr CTH stable, started ASA 81mg and SQL  - Rpt LED 5/23 for Lt soleal DVT Spoke with stroke PA at [id number], trasnfering to stroke service- pending stroke unit bed    Exam: EOS, Ox3, PERRL, mild Lt facial, FC, mild LUE drift, LUE 4+/5, LLE 4/5 (had fall prior to hospital), Rt side 5/5     - 24hr CTH stable, started ASA 81mg and SQL  - Rpt LED 5/23 for Lt soleal DVT  - MRI non contrast pending  - Regular diet

## 2025-05-19 NOTE — PROGRESS NOTE ADULT - SUBJECTIVE AND OBJECTIVE BOX
Patient seen and examined at bedside.    --Anticoagulation--    T(C): 36.8 (05-18-25 @ 23:00), Max: 37.1 (05-18-25 @ 10:50)  HR: 72 (05-19-25 @ 00:00) (68 - 107)  BP: 122/80 (05-19-25 @ 00:00) (95/64 - 172/88)  RR: 20 (05-19-25 @ 00:00) (12 - 26)  SpO2: 98% (05-19-25 @ 00:00) (90% - 100%)  Wt(kg): --    Exam: EOS, Ox3, PERRL, mild Lt facial, FC, mild LUE drift, LUE 4+/5, LLE 4/5 (had fall prior to hospital), Rt side 5/5

## 2025-05-20 DIAGNOSIS — I63.9 CEREBRAL INFARCTION, UNSPECIFIED: ICD-10-CM

## 2025-05-20 DIAGNOSIS — F17.200 NICOTINE DEPENDENCE, UNSPECIFIED, UNCOMPLICATED: ICD-10-CM

## 2025-05-20 DIAGNOSIS — I10 ESSENTIAL (PRIMARY) HYPERTENSION: ICD-10-CM

## 2025-05-20 LAB
ALBUMIN SERPL ELPH-MCNC: 3.7 G/DL — SIGNIFICANT CHANGE UP (ref 3.3–5)
ALP SERPL-CCNC: 80 U/L — SIGNIFICANT CHANGE UP (ref 40–120)
ALT FLD-CCNC: 17 U/L — SIGNIFICANT CHANGE UP (ref 10–45)
ANION GAP SERPL CALC-SCNC: 15 MMOL/L — SIGNIFICANT CHANGE UP (ref 5–17)
ANION GAP SERPL CALC-SCNC: 15 MMOL/L — SIGNIFICANT CHANGE UP (ref 5–17)
APPEARANCE UR: CLEAR — SIGNIFICANT CHANGE UP
AST SERPL-CCNC: 18 U/L — SIGNIFICANT CHANGE UP (ref 10–40)
B2 GLYCOPROT1 IGA SER QL: <2 U/ML — SIGNIFICANT CHANGE UP
B2 GLYCOPROT1 IGG SER-ACNC: <1.4 U/ML — SIGNIFICANT CHANGE UP
B2 GLYCOPROT1 IGM SER-ACNC: <1.5 U/ML — SIGNIFICANT CHANGE UP
BACTERIA # UR AUTO: NEGATIVE /HPF — SIGNIFICANT CHANGE UP
BILIRUB SERPL-MCNC: 0.6 MG/DL — SIGNIFICANT CHANGE UP (ref 0.2–1.2)
BILIRUB UR-MCNC: NEGATIVE — SIGNIFICANT CHANGE UP
BUN SERPL-MCNC: 11 MG/DL — SIGNIFICANT CHANGE UP (ref 7–23)
BUN SERPL-MCNC: 13 MG/DL — SIGNIFICANT CHANGE UP (ref 7–23)
CALCIUM SERPL-MCNC: 9.1 MG/DL — SIGNIFICANT CHANGE UP (ref 8.4–10.5)
CALCIUM SERPL-MCNC: 9.2 MG/DL — SIGNIFICANT CHANGE UP (ref 8.4–10.5)
CARDIOLIPIN IGM SER-MCNC: 1.9 MPL U/ML — SIGNIFICANT CHANGE UP
CARDIOLIPIN IGM SER-MCNC: <1.6 GPL U/ML — SIGNIFICANT CHANGE UP
CAST: 0 /LPF — SIGNIFICANT CHANGE UP (ref 0–4)
CHLORIDE SERPL-SCNC: 103 MMOL/L — SIGNIFICANT CHANGE UP (ref 96–108)
CHLORIDE SERPL-SCNC: 103 MMOL/L — SIGNIFICANT CHANGE UP (ref 96–108)
CO2 SERPL-SCNC: 20 MMOL/L — LOW (ref 22–31)
CO2 SERPL-SCNC: 21 MMOL/L — LOW (ref 22–31)
COLOR SPEC: YELLOW — SIGNIFICANT CHANGE UP
CREAT SERPL-MCNC: 0.92 MG/DL — SIGNIFICANT CHANGE UP (ref 0.5–1.3)
CREAT SERPL-MCNC: 0.97 MG/DL — SIGNIFICANT CHANGE UP (ref 0.5–1.3)
DEPRECATED CARDIOLIPIN IGA SER: <2 APL U/ML — SIGNIFICANT CHANGE UP
DIFF PNL FLD: ABNORMAL
EGFR: 92 ML/MIN/1.73M2 — SIGNIFICANT CHANGE UP
EGFR: 92 ML/MIN/1.73M2 — SIGNIFICANT CHANGE UP
EGFR: 98 ML/MIN/1.73M2 — SIGNIFICANT CHANGE UP
EGFR: 98 ML/MIN/1.73M2 — SIGNIFICANT CHANGE UP
FLUAV AG NPH QL: SIGNIFICANT CHANGE UP
FLUBV AG NPH QL: SIGNIFICANT CHANGE UP
GLUCOSE SERPL-MCNC: 87 MG/DL — SIGNIFICANT CHANGE UP (ref 70–99)
GLUCOSE SERPL-MCNC: 96 MG/DL — SIGNIFICANT CHANGE UP (ref 70–99)
GLUCOSE UR QL: 250 MG/DL
HCT VFR BLD CALC: 41.1 % — SIGNIFICANT CHANGE UP (ref 39–50)
HCT VFR BLD CALC: 43.4 % — SIGNIFICANT CHANGE UP (ref 39–50)
HCYS SERPL-MCNC: 11.1 UMOL/L — SIGNIFICANT CHANGE UP
HGB BLD-MCNC: 14.1 G/DL — SIGNIFICANT CHANGE UP (ref 13–17)
HGB BLD-MCNC: 14.2 G/DL — SIGNIFICANT CHANGE UP (ref 13–17)
KETONES UR QL: ABNORMAL MG/DL
LACTATE SERPL-SCNC: 1.1 MMOL/L — SIGNIFICANT CHANGE UP (ref 0.5–2)
LEUKOCYTE ESTERASE UR-ACNC: ABNORMAL
MAGNESIUM SERPL-MCNC: 2 MG/DL — SIGNIFICANT CHANGE UP (ref 1.6–2.6)
MCHC RBC-ENTMCNC: 29.1 PG — SIGNIFICANT CHANGE UP (ref 27–34)
MCHC RBC-ENTMCNC: 30.8 PG — SIGNIFICANT CHANGE UP (ref 27–34)
MCHC RBC-ENTMCNC: 32.5 G/DL — SIGNIFICANT CHANGE UP (ref 32–36)
MCHC RBC-ENTMCNC: 34.5 G/DL — SIGNIFICANT CHANGE UP (ref 32–36)
MCV RBC AUTO: 89.2 FL — SIGNIFICANT CHANGE UP (ref 80–100)
MCV RBC AUTO: 89.7 FL — SIGNIFICANT CHANGE UP (ref 80–100)
NITRITE UR-MCNC: NEGATIVE — SIGNIFICANT CHANGE UP
NRBC BLD AUTO-RTO: 0 /100 WBCS — SIGNIFICANT CHANGE UP (ref 0–0)
NRBC BLD AUTO-RTO: 0 /100 WBCS — SIGNIFICANT CHANGE UP (ref 0–0)
PH UR: 6 — SIGNIFICANT CHANGE UP (ref 5–8)
PHOSPHATE SERPL-MCNC: 3.1 MG/DL — SIGNIFICANT CHANGE UP (ref 2.5–4.5)
PLATELET # BLD AUTO: 241 K/UL — SIGNIFICANT CHANGE UP (ref 150–400)
PLATELET # BLD AUTO: 259 K/UL — SIGNIFICANT CHANGE UP (ref 150–400)
POTASSIUM SERPL-MCNC: 3.8 MMOL/L — SIGNIFICANT CHANGE UP (ref 3.5–5.3)
POTASSIUM SERPL-MCNC: 3.9 MMOL/L — SIGNIFICANT CHANGE UP (ref 3.5–5.3)
POTASSIUM SERPL-SCNC: 3.8 MMOL/L — SIGNIFICANT CHANGE UP (ref 3.5–5.3)
POTASSIUM SERPL-SCNC: 3.9 MMOL/L — SIGNIFICANT CHANGE UP (ref 3.5–5.3)
PROT SERPL-MCNC: 6.6 G/DL — SIGNIFICANT CHANGE UP (ref 6–8.3)
PROT UR-MCNC: NEGATIVE MG/DL — SIGNIFICANT CHANGE UP
RBC # BLD: 4.61 M/UL — SIGNIFICANT CHANGE UP (ref 4.2–5.8)
RBC # BLD: 4.84 M/UL — SIGNIFICANT CHANGE UP (ref 4.2–5.8)
RBC # FLD: 13.7 % — SIGNIFICANT CHANGE UP (ref 10.3–14.5)
RBC # FLD: 13.9 % — SIGNIFICANT CHANGE UP (ref 10.3–14.5)
RBC CASTS # UR COMP ASSIST: 4 /HPF — SIGNIFICANT CHANGE UP (ref 0–4)
RSV RNA NPH QL NAA+NON-PROBE: SIGNIFICANT CHANGE UP
SARS-COV-2 RNA SPEC QL NAA+PROBE: SIGNIFICANT CHANGE UP
SODIUM SERPL-SCNC: 138 MMOL/L — SIGNIFICANT CHANGE UP (ref 135–145)
SODIUM SERPL-SCNC: 139 MMOL/L — SIGNIFICANT CHANGE UP (ref 135–145)
SOURCE RESPIRATORY: SIGNIFICANT CHANGE UP
SP GR SPEC: 1.02 — SIGNIFICANT CHANGE UP (ref 1–1.03)
SQUAMOUS # UR AUTO: 1 /HPF — SIGNIFICANT CHANGE UP (ref 0–5)
UROBILINOGEN FLD QL: 1 MG/DL — SIGNIFICANT CHANGE UP (ref 0.2–1)
WBC # BLD: 5.41 K/UL — SIGNIFICANT CHANGE UP (ref 3.8–10.5)
WBC # BLD: 7.69 K/UL — SIGNIFICANT CHANGE UP (ref 3.8–10.5)
WBC # FLD AUTO: 5.41 K/UL — SIGNIFICANT CHANGE UP (ref 3.8–10.5)
WBC # FLD AUTO: 7.69 K/UL — SIGNIFICANT CHANGE UP (ref 3.8–10.5)
WBC UR QL: 2 /HPF — SIGNIFICANT CHANGE UP (ref 0–5)

## 2025-05-20 PROCEDURE — 71045 X-RAY EXAM CHEST 1 VIEW: CPT | Mod: 26

## 2025-05-20 PROCEDURE — 99232 SBSQ HOSP IP/OBS MODERATE 35: CPT

## 2025-05-20 PROCEDURE — 99233 SBSQ HOSP IP/OBS HIGH 50: CPT

## 2025-05-20 PROCEDURE — 70551 MRI BRAIN STEM W/O DYE: CPT | Mod: 26

## 2025-05-20 PROCEDURE — G0452: CPT | Mod: 26

## 2025-05-20 PROCEDURE — 93010 ELECTROCARDIOGRAM REPORT: CPT

## 2025-05-20 RX ORDER — ACETAMINOPHEN 500 MG/5ML
1000 LIQUID (ML) ORAL ONCE
Refills: 0 | Status: COMPLETED | OUTPATIENT
Start: 2025-05-20 | End: 2025-05-20

## 2025-05-20 RX ORDER — DIAZEPAM 5 MG/1
2 TABLET ORAL ONCE
Refills: 0 | Status: DISCONTINUED | OUTPATIENT
Start: 2025-05-20 | End: 2025-05-20

## 2025-05-20 RX ORDER — VANCOMYCIN HCL IN 5 % DEXTROSE 1.5G/250ML
750 PLASTIC BAG, INJECTION (ML) INTRAVENOUS EVERY 12 HOURS
Refills: 0 | Status: DISCONTINUED | OUTPATIENT
Start: 2025-05-21 | End: 2025-05-21

## 2025-05-20 RX ORDER — APIXABAN 2.5 MG/1
5 TABLET, FILM COATED ORAL
Refills: 0 | Status: DISCONTINUED | OUTPATIENT
Start: 2025-05-20 | End: 2025-05-21

## 2025-05-20 RX ORDER — DAPAGLIFLOZIN 5 MG/1
10 TABLET, FILM COATED ORAL DAILY
Refills: 0 | Status: DISCONTINUED | OUTPATIENT
Start: 2025-05-20 | End: 2025-05-23

## 2025-05-20 RX ORDER — VANCOMYCIN HCL IN 5 % DEXTROSE 1.5G/250ML
PLASTIC BAG, INJECTION (ML) INTRAVENOUS
Refills: 0 | Status: DISCONTINUED | OUTPATIENT
Start: 2025-05-20 | End: 2025-05-21

## 2025-05-20 RX ORDER — METOPROLOL SUCCINATE 50 MG/1
25 TABLET, EXTENDED RELEASE ORAL DAILY
Refills: 0 | Status: DISCONTINUED | OUTPATIENT
Start: 2025-05-20 | End: 2025-05-23

## 2025-05-20 RX ORDER — PIPERACILLIN-TAZO-DEXTROSE,ISO 3.375G/5
3.38 IV SOLUTION, PIGGYBACK PREMIX FROZEN(ML) INTRAVENOUS ONCE
Refills: 0 | Status: COMPLETED | OUTPATIENT
Start: 2025-05-20 | End: 2025-05-20

## 2025-05-20 RX ORDER — PIPERACILLIN-TAZO-DEXTROSE,ISO 3.375G/5
3.38 IV SOLUTION, PIGGYBACK PREMIX FROZEN(ML) INTRAVENOUS EVERY 8 HOURS
Refills: 0 | Status: DISCONTINUED | OUTPATIENT
Start: 2025-05-21 | End: 2025-05-23

## 2025-05-20 RX ORDER — VANCOMYCIN HCL IN 5 % DEXTROSE 1.5G/250ML
750 PLASTIC BAG, INJECTION (ML) INTRAVENOUS ONCE
Refills: 0 | Status: COMPLETED | OUTPATIENT
Start: 2025-05-20 | End: 2025-05-20

## 2025-05-20 RX ADMIN — Medication 81 MILLIGRAM(S): at 11:00

## 2025-05-20 RX ADMIN — APIXABAN 5 MILLIGRAM(S): 2.5 TABLET, FILM COATED ORAL at 18:28

## 2025-05-20 RX ADMIN — Medication 2 TABLET(S): at 21:24

## 2025-05-20 RX ADMIN — DAPAGLIFLOZIN 10 MILLIGRAM(S): 5 TABLET, FILM COATED ORAL at 15:58

## 2025-05-20 RX ADMIN — METOPROLOL SUCCINATE 25 MILLIGRAM(S): 50 TABLET, EXTENDED RELEASE ORAL at 15:58

## 2025-05-20 RX ADMIN — Medication 400 MILLIGRAM(S): at 17:49

## 2025-05-20 RX ADMIN — NICOTINE POLACRILEX 1 PATCH: 4 GUM, CHEWING ORAL at 20:25

## 2025-05-20 RX ADMIN — Medication 250 MILLIGRAM(S): at 18:28

## 2025-05-20 RX ADMIN — NICOTINE POLACRILEX 1 PATCH: 4 GUM, CHEWING ORAL at 16:04

## 2025-05-20 RX ADMIN — Medication 200 GRAM(S): at 18:28

## 2025-05-20 RX ADMIN — NICOTINE POLACRILEX 1 PATCH: 4 GUM, CHEWING ORAL at 16:12

## 2025-05-20 RX ADMIN — NICOTINE POLACRILEX 1 PATCH: 4 GUM, CHEWING ORAL at 06:52

## 2025-05-20 RX ADMIN — POLYETHYLENE GLYCOL 3350 17 GRAM(S): 17 POWDER, FOR SOLUTION ORAL at 05:19

## 2025-05-20 RX ADMIN — Medication 25 GRAM(S): at 21:24

## 2025-05-20 RX ADMIN — DIAZEPAM 2 MILLIGRAM(S): 5 TABLET ORAL at 14:08

## 2025-05-20 RX ADMIN — ATORVASTATIN CALCIUM 80 MILLIGRAM(S): 80 TABLET, FILM COATED ORAL at 21:24

## 2025-05-20 RX ADMIN — TAMSULOSIN HYDROCHLORIDE 0.4 MILLIGRAM(S): 0.4 CAPSULE ORAL at 21:24

## 2025-05-20 NOTE — PROGRESS NOTE ADULT - SUBJECTIVE AND OBJECTIVE BOX
Vascular Cardiology Consult Note    DIRECT SERVICE NUMBER:  [phone]                 CC:  DVT    HPI:      Doing well  in ICU, sitting in chair  no CP or SOB  No PE seen  EF noted to be reduced   No PFO on echo, no LV thrombus  on aspirin and lovenox         (18 May 2025 11:27)      Allergies    No Known Allergies    Intolerances    	    MEDICATIONS:     MEDICATIONS  (STANDING):  aspirin  chewable 81 milliGRAM(s) Oral daily  atorvastatin 80 milliGRAM(s) Oral at bedtime  enoxaparin Injectable 40 milliGRAM(s) SubCutaneous <User Schedule>  nicotine -  14 mG/24Hr(s) Patch 1 Patch Transdermal daily  polyethylene glycol 3350 17 Gram(s) Oral two times a day  senna 2 Tablet(s) Oral at bedtime  tamsulosin 0.4 milliGRAM(s) Oral at bedtime        FAMILY HISTORY:  No pertinent family history in first degree relatives        SOCIAL HISTORY:  unchanged    REVIEW OF SYSTEMS:    CONSTITUTIONAL: No fever, weight loss, or fatigue  EYES: No eye pain, visual disturbances, or discharge  ENT:  No difficulty hearing, tinnitus, vertigo; No sinus or throat pain  NECK: No pain or stiffness  RESPIRATORY:  No shortness of breath   CARDIOVASCULAR:  No Chest pain   GASTROINTESTINAL: No abdominal or epigastric pain. No nausea, vomiting, or hematemesis; No diarrhea or constipation. No melena or hematochezia.  GENITOURINARY: No dysuria, frequency, hematuria, or incontinence  NEUROLOGICAL: No headaches, memory loss, loss of strength, numbness, or tremors  LYMPH Nodes: No enlarged glands  ENDOCRINE: No heat or cold intolerance; No hair loss  MUSCULOSKELETAL: No joint pain or swelling; No muscle, back, or extremity pain  PSYCHIATRIC: No depression, anxiety, mood swings, or difficulty sleeping  HEME/LYMPH: No easy bruising, or bleeding gums  ALLERGY AND IMMUNOLOGIC: No hives or eczema	    [ x] All others negative	  [ ] Unable to obtain    ICU Vital Signs Last 24 Hrs  T(C): 36.7 (20 May 2025 07:00), Max: 37.1 (19 May 2025 19:00)  T(F): 98.1 (20 May 2025 07:00), Max: 98.8 (19 May 2025 19:00)  HR: 85 (20 May 2025 09:00) (83 - 110)  BP: 145/91 (20 May 2025 09:00) (121/83 - 158/105)  BP(mean): 113 (20 May 2025 09:00) (97 - 126)  ABP: --  ABP(mean): --  RR: 19 (20 May 2025 09:00) (16 - 29)  SpO2: 97% (20 May 2025 09:00) (92% - 100%)    O2 Parameters below as of 20 May 2025 07:00  Patient On (Oxygen Delivery Method): room air              Appearance:  	  HEENT:   Normal oral mucosa, PERRL, EOMI	  Lymphatic: No lymphadenopathy  Cardiovascular:  S1S2 RRR   Respiratory:  	Clear  Psychiatry:  Alert and oriented  Gastrointestinal:  Soft, Non-tender, + BS	  Skin: No rashes, No ecchymoses, No cyanosis	                              14.1   5.41  )-----------( 259      ( 20 May 2025 05:56 )             43.4       Assessment:  1. L soleal DVT  2.  Stroke   3.  Reduced EF        Plan:   1.  Cont lovenox 40mg daily   2.  CTPA showed no PE  3.  Repeat venous duplex in 3-5 days to assure no propagation  4.  EF noted to be reduced, gen cardio consult vs. CHF team consult  5.  Will follow      Isabella

## 2025-05-20 NOTE — CHART NOTE - NSCHARTNOTEFT_GEN_A_CORE
*****NEUROLOGY ACP EVENT NOTE*****  HPI:  HPI: Patient TIFFANY SANCHEZ is a 54y (1970) RH man with a PMHx significant for HTN presenting as code stroke for Left sided weakness. Patient was driving, pulled over  due to chest pain and SOB. Neighbor called EMS after finding him on the floor with slurred speech and L sided weakness.Spoke with Wife Daiana, who saw him this morning at his baseline. He was complaining of SOB. Denies being on AC/AP. No hx stroke. Does not take any medications. At baseline, o x3, ambulates without assistance, independent of all Adls.        EVENT:   Called to patients bedside by nurse for tachycardia 100-105. BP stable. 12 lead ekg shows sinus tachycardia, smiliar compared to prior EKG. Reviewed EKG with cardiologist Dr. Solorzano. Patient spiked fever 101.2 (oral), infectious workup started ( CBC/CMP/lactate/UA/RSV/FLU/blood cultures) ordered, cxray ordered. IV tylenol for fever. Per ROSE MARIE Munguia, patient's tachycardia started after returning from  brain. Discussed with IM team Dr. Santos, started empirical treatment zosyn and vancomycin for aspiration pneumonia. Will consult ID tmr       acetaminophen     Tablet .. 650 milliGRAM(s) Oral every 6 hours PRN  apixaban 5 milliGRAM(s) Oral two times a day  atorvastatin 80 milliGRAM(s) Oral at bedtime  dapagliflozin 10 milliGRAM(s) Oral daily  metoprolol succinate ER 25 milliGRAM(s) Oral daily  nicotine -  14 mG/24Hr(s) Patch 1 Patch Transdermal daily  piperacillin/tazobactam IVPB. 3.375 Gram(s) IV Intermittent once  piperacillin/tazobactam IVPB.- 3.375 Gram(s) IV Intermittent once  polyethylene glycol 3350 17 Gram(s) Oral two times a day  senna 2 Tablet(s) Oral at bedtime  tamsulosin 0.4 milliGRAM(s) Oral at bedtime  vancomycin  IVPB      vancomycin  IVPB 750 milliGRAM(s) IV Intermittent once      CONSTITUTIONAL: fever, SOB  EYES/ENT: No visual changes;  No vertigo or throat pain   NECK: No pain or stiffness, swollen neck  RESPIRATORY: MILD shortness of breath, cough, wheezing, hemoptysis or orthopnea  CARDIOVASCULAR: No chest pain or palpitations, dyspnea on exertion, LE swelling  GASTROINTESTINAL: No abdominal or epigastric pain; No nausea, vomiting, or hematemesis; No diarrhea or constipation; No melena or hematochezia.  GENITOURINARY: No dysuria, frequency incontinence or hematuria.  NEUROLOGICAL: No loss of sensation,  numbness, tingling, or weakness, seizure or blackouts.   SKIN: No itching, burning, rashes, or lesions   All other review of systems is negative unless indicated above.    Family History  Surgical History    PHYSICAL EXAM:   Vital Signs Last 24 Hrs  T(C): 37.1 (20 May 2025 12:12), Max: 37.1 (19 May 2025 19:00)  T(F): 98.7 (20 May 2025 12:12), Max: 98.8 (19 May 2025 19:00)  HR: 90 (20 May 2025 16:00) (83 - 105)  BP: 155/96 (20 May 2025 16:00) (121/83 - 158/105)  BP(mean): 118 (20 May 2025 16:00) (97 - 126)  RR: 20 (20 May 2025 16:00) (16 - 29)  SpO2: 97% (20 May 2025 16:00) (92% - 100%)    Parameters below as of 20 May 2025 16:00  Patient On (Oxygen Delivery Method): room air        General: Alert and Oriented x 3. No acute Distress. Well Nourished, Well Developed  Cardiac: Sinus tachycardia, No Jugular Venous Distension. No Peripheral Edema.  Pulmonary: Clear Breath Sounds to Auscultation Bilateraly. No Accessory Muscle use.   Abdomen: Soft, Nontender, Nondistended. No palpable Mass. Normal Bowel Sounds    NEUROLOGICAL EXAM:  Mental status: Awake, alert, oriented x3, speech fluent, follows commands, no neglect, normal memory   Cranial Nerves: left nasolabial weakness, no nystagmus, no dysarthria,  tongue midline  Motor exam: Normal tone,  Drift LUE and LLE extremities, RUE and RLE no drift   Sensation: Intact to light touch   Coordination/ Gait: No dysmetria, gait not tested    LABS:                        14.1   5.41  )-----------( 259      ( 20 May 2025 05:56 )             43.4    05-20    138  |  103  |  11  ----------------------------<  96  3.9   |  20[L]  |  0.97    Ca    9.1      20 May 2025 05:56  Phos  3.1     05-20  Mg     2.0     05-20          IMAGING: Reviewed by me.   < from: MR Head No Cont (05.20.25 @ 15:02) >  IMPRESSION:  1.  Acute infarcts in the right basal ganglia/corona radiata and high   right paramedian frontal lobe.  2.  Questionable punctate acute infarct in the left cerebellar hemisphere.  3.  Chronic ischemic changes as discussed above.        Impression: Fever likely in the setting of aspiration Pneumonia.       Plan:   infectious workup started ( CBC/CMP/lactate/UA/RSV/FLU/blood cultures) ordered, cxray ordered. IV tylenol for fever. Discussed with IM team Dr. Santos, started empirical treatment zosyn and vancomycin for aspiration pneumonia. Will consult ID tmr       Time spent with patient:  20 minutes  Event discussed with: Dr. Ady Manley, Dr. Santos

## 2025-05-20 NOTE — PROGRESS NOTE ADULT - SUBJECTIVE AND OBJECTIVE BOX
Neurology - Progress    -  Spectra: [id number] ([facility]), [phone] ([facility])  -    HPI: Patient TIFFANY SANCHEZ is a 54y (1970) wo/man with a PMHx significant for ***      Review of Systems:   All other review of systems is negative unless indicated above.    Allergies:  No Known Allergies      PMHx/PSHx/Family Hx: As above, otherwise see below   No pertinent past medical history    HTN (hypertension)        Social Hx:  No current use of tobacco, alcohol, or illicit drugs  Lives with ***    Medications:  MEDICATIONS  (STANDING):  aspirin  chewable 81 milliGRAM(s) Oral daily  atorvastatin 80 milliGRAM(s) Oral at bedtime  enoxaparin Injectable 40 milliGRAM(s) SubCutaneous <User Schedule>  nicotine -  14 mG/24Hr(s) Patch 1 Patch Transdermal daily  polyethylene glycol 3350 17 Gram(s) Oral two times a day  senna 2 Tablet(s) Oral at bedtime  tamsulosin 0.4 milliGRAM(s) Oral at bedtime    MEDICATIONS  (PRN):  acetaminophen     Tablet .. 650 milliGRAM(s) Oral every 6 hours PRN Temp greater or equal to 38.5C (101.3F), Mild Pain (1 - 3)      Vitals:  T(C): 36.7 (05-20-25 @ 07:00), Max: 37.1 (05-19-25 @ 19:00)  HR: 85 (05-20-25 @ 09:00) (83 - 110)  BP: 145/91 (05-20-25 @ 09:00) (121/83 - 158/105)  RR: 19 (05-20-25 @ 09:00) (16 - 29)  SpO2: 97% (05-20-25 @ 09:00) (92% - 100%)    Physical Examination: INCOMPLETE  General - NAD  Cardiovascular - Peripheral pulses palpable, no edema  Eyes - Fundoscopy with flat, sharp optic discs and no hemorrhage or exudates; Fundoscopy not well visualized; Fundoscopy not performed due to safety precautions in the setting of the COVID-19 pandemic    Neurologic Exam:  Mental status - Awake, Alert, Oriented to person, place, and time. Speech fluent, repetition and naming intact. Follows simple and complex commands. Attention/concentration, recent and remote memory (including registration and recall), and fund of knowledge intact    Cranial nerves - PERRL, VFF, EOMI, face sensation (V1-V3) intact b/l, facial strength intact without asymmetry b/l, hearing intact b/l, palate with symmetric elevation, trapezius OR sternocleidomastiod 5/5 strength b/l, tongue midline on protrusion with full lateral movement    Motor - Normal bulk and tone throughout. No pronator drift.  Strength testing            Deltoid      Biceps      Triceps     Wrist Extension    Wrist Flexion     Interossei         R            5                 5               5                     5                              5                        5                 5  L             5                 5               5                     5                              5                        5                 5              Hip Flexion    Hip Extension    Knee Flexion    Knee Extension    Dorsiflexion    Plantar Flexion  R              5                           5                       5                           5                            5                          5  L              5                           5                        5                           5                            5                          5    Sensation - Light touch/temperature OR pain/vibration/ proprioception intact throughout    DTR's -             Biceps      Triceps     Brachioradialis      Patellar    Ankle    Toes/plantar response  R             2+             2+                  2+                       2+            2+                 Down  L              2+             2+                 2+                        2+           2+                 Down    Coordination - Finger to Nose and Heel Torres intact b/l. No tremors appreciated    Gait and station - Normal casual gait. Romberg (-)    Labs:                        14.1   5.41  )-----------( 259      ( 20 May 2025 05:56 )             43.4     05-20    138  |  103  |  11  ----------------------------<  96  3.9   |  20[L]  |  0.97    Ca    9.1      20 May 2025 05:56  Phos  3.1     05-20  Mg     2.0     05-20    TPro  6.6  /  Alb  3.7  /  TBili  0.4  /  DBili  x   /  AST  22  /  ALT  22  /  AlkPhos  86  05-18    CAPILLARY BLOOD GLUCOSE  116 (18 May 2025 11:24)      POCT Blood Glucose.: 116 mg/dL (18 May 2025 10:28)    LIVER FUNCTIONS - ( 18 May 2025 13:49 )  Alb: 3.7 g/dL / Pro: 6.6 g/dL / ALK PHOS: 86 U/L / ALT: 22 U/L / AST: 22 U/L / GGT: x             PT/INR - ( 18 May 2025 10:31 )   PT: 14.4 sec;   INR: 1.27 ratio         PTT - ( 18 May 2025 10:31 )  PTT:29.5 sec      Radiology:  CT Head No Cont:  (19 May 2025 11:32)     Neurology - Progress      Interval History: No acute overnight events. Wife at bedside. Patient reports 2 years ago was told by a cardiologist his "heart was not pumping right" but did not follow up after. Denies alcohol use.       Review of Systems:   All other review of systems is negative unless indicated above.    Allergies:  No Known Allergies      PMHx/PSHx/Family Hx: As above, otherwise see below   No pertinent past medical history    HTN (hypertension)            Medications:  MEDICATIONS  (STANDING):  aspirin  chewable 81 milliGRAM(s) Oral daily  atorvastatin 80 milliGRAM(s) Oral at bedtime  enoxaparin Injectable 40 milliGRAM(s) SubCutaneous <User Schedule>  nicotine -  14 mG/24Hr(s) Patch 1 Patch Transdermal daily  polyethylene glycol 3350 17 Gram(s) Oral two times a day  senna 2 Tablet(s) Oral at bedtime  tamsulosin 0.4 milliGRAM(s) Oral at bedtime    MEDICATIONS  (PRN):  acetaminophen     Tablet .. 650 milliGRAM(s) Oral every 6 hours PRN Temp greater or equal to 38.5C (101.3F), Mild Pain (1 - 3)      Vitals:  T(C): 36.7 (05-20-25 @ 07:00), Max: 37.1 (05-19-25 @ 19:00)  HR: 85 (05-20-25 @ 09:00) (83 - 110)  BP: 145/91 (05-20-25 @ 09:00) (121/83 - 158/105)  RR: 19 (05-20-25 @ 09:00) (16 - 29)  SpO2: 97% (05-20-25 @ 09:00) (92% - 100%)    Physical Examination  General - NAD, sitting in chair    Neurologic Exam:  Mental status - Awake, Alert, Oriented to person, place, and time. Speech fluent, repetition and naming intact. Follows commands    Cranial nerves - VFF, EOMI, Left facial palsy, hearing intact b/l    Motor -   Strength testing   Drift LUE and LLE extremities   RUE and RLE no drift     Coordination - No tremors appreciated    Gait and station - Deffered    Labs:                        14.1   5.41  )-----------( 259      ( 20 May 2025 05:56 )             43.4     05-20    138  |  103  |  11  ----------------------------<  96  3.9   |  20[L]  |  0.97    Ca    9.1      20 May 2025 05:56  Phos  3.1     05-20  Mg     2.0     05-20    TPro  6.6  /  Alb  3.7  /  TBili  0.4  /  DBili  x   /  AST  22  /  ALT  22  /  AlkPhos  86  05-18    CAPILLARY BLOOD GLUCOSE  116 (18 May 2025 11:24)      POCT Blood Glucose.: 116 mg/dL (18 May 2025 10:28)    LIVER FUNCTIONS - ( 18 May 2025 13:49 )  Alb: 3.7 g/dL / Pro: 6.6 g/dL / ALK PHOS: 86 U/L / ALT: 22 U/L / AST: 22 U/L / GGT: x             PT/INR - ( 18 May 2025 10:31 )   PT: 14.4 sec;   INR: 1.27 ratio         PTT - ( 18 May 2025 10:31 )  PTT:29.5 sec      Radiology:  CT Head No Cont:  (19 May 2025 11:32)  < from: CT Angio Chest PE Protocol w/ IV Cont (05.19.25 @ 18:06) >  IMPRESSION:  No pulmonary embolism.    Subtle central predominant groundglass opacities with septal thickening,   which are nonspecific but may be seen in setting of pulmonary edema.    Multiple liver lesions, likely a combination of cysts and hemangioma.   Additional indeterminate lesions in the right hepatic lobe, for further   evaluation with MRI with and without contrast may be obtained.      < end of copied text >  < from: CT Head No Cont (05.19.25 @ 11:32) >    IMPRESSION:    Hypoattenuation in the right basal ganglia and right periinsular cortex,   compatible with acute infarct. No associated hemorrhage or mass effect.    Small chronic infarct in the right frontal white matter adjacent to the   right frontal horn is reidentified.    < end of copied text >       Neurology -Primary Vascular Progress note      Interval History: No acute overnight events. Wife at bedside. Patient reports 2 years ago was told by a cardiologist his "heart was not pumping right" but did not follow up after. Denies alcohol use.       Review of Systems:   All other review of systems is negative unless indicated above.    Allergies:  No Known Allergies      PMHx/PSHx/Family Hx: As above, otherwise see below   No pertinent past medical history    HTN (hypertension)            Medications:  MEDICATIONS  (STANDING):  aspirin  chewable 81 milliGRAM(s) Oral daily  atorvastatin 80 milliGRAM(s) Oral at bedtime  enoxaparin Injectable 40 milliGRAM(s) SubCutaneous <User Schedule>  nicotine -  14 mG/24Hr(s) Patch 1 Patch Transdermal daily  polyethylene glycol 3350 17 Gram(s) Oral two times a day  senna 2 Tablet(s) Oral at bedtime  tamsulosin 0.4 milliGRAM(s) Oral at bedtime    MEDICATIONS  (PRN):  acetaminophen     Tablet .. 650 milliGRAM(s) Oral every 6 hours PRN Temp greater or equal to 38.5C (101.3F), Mild Pain (1 - 3)      Vitals:  T(C): 36.7 (05-20-25 @ 07:00), Max: 37.1 (05-19-25 @ 19:00)  HR: 85 (05-20-25 @ 09:00) (83 - 110)  BP: 145/91 (05-20-25 @ 09:00) (121/83 - 158/105)  RR: 19 (05-20-25 @ 09:00) (16 - 29)  SpO2: 97% (05-20-25 @ 09:00) (92% - 100%)    Physical Examination  General - NAD, sitting in chair    Neurologic Exam:  Mental status - Awake, Alert, Oriented to person, place, and time. Speech fluent, repetition and naming intact. Follows commands    Cranial nerves - VFF, EOMI, Left facial palsy, hearing intact b/l    Motor -   Strength testing   Drift LUE and LLE extremities   RUE and RLE no drift     Coordination - No tremors appreciated    Gait and station - Deffered    Labs:                        14.1   5.41  )-----------( 259      ( 20 May 2025 05:56 )             43.4     05-20    138  |  103  |  11  ----------------------------<  96  3.9   |  20[L]  |  0.97    Ca    9.1      20 May 2025 05:56  Phos  3.1     05-20  Mg     2.0     05-20    TPro  6.6  /  Alb  3.7  /  TBili  0.4  /  DBili  x   /  AST  22  /  ALT  22  /  AlkPhos  86  05-18    CAPILLARY BLOOD GLUCOSE  116 (18 May 2025 11:24)      POCT Blood Glucose.: 116 mg/dL (18 May 2025 10:28)    LIVER FUNCTIONS - ( 18 May 2025 13:49 )  Alb: 3.7 g/dL / Pro: 6.6 g/dL / ALK PHOS: 86 U/L / ALT: 22 U/L / AST: 22 U/L / GGT: x             PT/INR - ( 18 May 2025 10:31 )   PT: 14.4 sec;   INR: 1.27 ratio         PTT - ( 18 May 2025 10:31 )  PTT:29.5 sec      Radiology:  CT Head No Cont:  (19 May 2025 11:32)  < from: CT Angio Chest PE Protocol w/ IV Cont (05.19.25 @ 18:06) >  IMPRESSION:  No pulmonary embolism.    Subtle central predominant groundglass opacities with septal thickening,   which are nonspecific but may be seen in setting of pulmonary edema.    Multiple liver lesions, likely a combination of cysts and hemangioma.   Additional indeterminate lesions in the right hepatic lobe, for further   evaluation with MRI with and without contrast may be obtained.      < end of copied text >  < from: CT Head No Cont (05.19.25 @ 11:32) >    IMPRESSION:    Hypoattenuation in the right basal ganglia and right periinsular cortex,   compatible with acute infarct. No associated hemorrhage or mass effect.    Small chronic infarct in the right frontal white matter adjacent to the   right frontal horn is reidentified.    < end of copied text >

## 2025-05-20 NOTE — CONSULT NOTE ADULT - SUBJECTIVE AND OBJECTIVE BOX
CHIEF COMPLAINT:    HISTORY OF PRESENT ILLNESS:  54 year old right hand dominant male with a PMHx significant for HTN presenting as code stroke with left sided weakness. Patient was driving, pulled over due to chest pain and SOB. Neighbor called EMS after finding him on the floor with slurred speech and left sided weakness. The team spoke with patient's wife Daiana, who saw him this morning at his baseline. He was complaining of SOB. Patient denies being on AC/AP. Patient is POD#2 status post catheter cerebral angiogram and mechanical thrombectomy.     PAST MEDICAL & SURGICAL HISTORY:  HTN (hypertension)      No significant past surgical history              MEDICATIONS:  aspirin  chewable 81 milliGRAM(s) Oral daily  enoxaparin Injectable 40 milliGRAM(s) SubCutaneous <User Schedule>        acetaminophen     Tablet .. 650 milliGRAM(s) Oral every 6 hours PRN  diazepam    Tablet 2 milliGRAM(s) Oral once    polyethylene glycol 3350 17 Gram(s) Oral two times a day  senna 2 Tablet(s) Oral at bedtime    atorvastatin 80 milliGRAM(s) Oral at bedtime    tamsulosin 0.4 milliGRAM(s) Oral at bedtime      FAMILY HISTORY:  No pertinent family history in first degree relatives        SOCIAL HISTORY:    [ ] Non-smoker  [ ] Smoker  [ ] Alcohol    Allergies    No Known Allergies    Intolerances    	    REVIEW OF SYSTEMS:  CONSTITUTIONAL: No fever, weight loss, or fatigue  EYES: No eye pain, visual disturbances, or discharge  ENMT:  No difficulty hearing, tinnitus, vertigo; No sinus or throat pain  NECK: No pain or stiffness  RESPIRATORY: No cough, wheezing, chills or hemoptysis; No Shortness of Breath  CARDIOVASCULAR: No chest pain, palpitations, passing out, dizziness, or leg swelling  GASTROINTESTINAL: No abdominal or epigastric pain. No nausea, vomiting, or hematemesis; No diarrhea or constipation. No melena or hematochezia.  GENITOURINARY: No dysuria, frequency, hematuria, or incontinence  NEUROLOGICAL: No headaches, memory loss, loss of strength, numbness, or tremors  SKIN: No itching, burning, rashes, or lesions   LYMPH Nodes: No enlarged glands  ENDOCRINE: No heat or cold intolerance; No hair loss  MUSCULOSKELETAL: No joint pain or swelling; No muscle, back, or extremity pain  PSYCHIATRIC: No depression, anxiety, mood swings, or difficulty sleeping  HEME/LYMPH: No easy bruising, or bleeding gums  ALLERY AND IMMUNOLOGIC: No hives or eczema	    [ ] All others negative	  [ ] Unable to obtain    PHYSICAL EXAM:  T(C): 36.7 (05-20-25 @ 07:00), Max: 37.1 (05-19-25 @ 19:00)  HR: 91 (05-20-25 @ 10:00) (83 - 110)  BP: 143/93 (05-20-25 @ 10:00) (121/83 - 158/105)  RR: 26 (05-20-25 @ 10:00) (16 - 29)  SpO2: 98% (05-20-25 @ 10:00) (92% - 100%)  Wt(kg): --  I&O's Summary    19 May 2025 07:01  -  20 May 2025 07:00  --------------------------------------------------------  IN: 690 mL / OUT: 1700 mL / NET: -1010 mL    20 May 2025 07:01  -  20 May 2025 10:57  --------------------------------------------------------  IN: 0 mL / OUT: 500 mL / NET: -500 mL        Appearance: Normal	  HEENT:   Normal oral mucosa, PERRL, EOMI	  Lymphatic: No lymphadenopathy  Cardiovascular: Normal S1 S2, No JVD, No murmurs, No edema  Respiratory: Lungs clear to auscultation	  Psychiatry: A & O x 3, Mood & affect appropriate  Gastrointestinal:  Soft, Non-tender, + BS	  Skin: No rashes, No ecchymoses, No cyanosis	  Neurologic Exam:  Mental status - Awake, Alert, Oriented to person, place, and time. Speech fluent, repetition and naming intact. Follows commands    Cranial nerves - VFF, EOMI, Left facial palsy, hearing intact b/l    Motor -   Strength testing   Drift LUE and LLE extremities   RUE and RLE no drift     Coordination - No tremors appreciated    Gait and station - Deffered    Extremities: Normal range of motion, No clubbing, cyanosis or edema  Vascular: Peripheral pulses palpable 2+ bilaterally    TELEMETRY: 	SR    ECG:  	  RADIOLOGY:  < from: CT Angio Chest PE Protocol w/ IV Cont (05.19.25 @ 18:06) >    ACC: [id number] EXAM:  CT ABDOMEN AND PELVIS IC   ORDERED BY:  MARIAM QUINONES     ACC: [id number] EXAM:  CT ANGIO CHEST PULM ART WAWIC   ORDERED BY:  MARIAM QUINONES     PROCEDURE DATE:  05/19/2025          INTERPRETATION:  CLINICAL INFORMATION: Ischemic stroke with new DVT.   Evaluate for PE and malignancy    COMPARISON: None.    CONTRAST/COMPLICATIONS:  IV Contrast: Omnipaque 350  90 cc administered   10 cc discarded  Oral Contrast: NONE  .    PROCEDURE:  CT Angiography of the Chest was performed followed by portal venous phase   imaging of the Abdomen and Pelvis.  Sagittal and coronal reformats were performed as well as 3D (MIP)   reconstructions.    FINDINGS:  CHEST:  LUNGS AND LARGE AIRWAYS: Patent central airways. Subtle central   predominant groundglass opacities with scattered septal thickening.  PLEURA: No pleural effusion.  VESSELS: No pulmonary embolism.  HEART: Heart is enlarged. No pericardial effusion.  MEDIASTINUM AND JOHNNY: No lymphadenopathy.  CHEST WALL AND LOWER NECK: Within normal limits.    ABDOMEN AND PELVIS:  LIVER: Heterogeneous, predominantly hypodense lesion with peripheral   nodular enhancement in segment 6 measuring 1.6 x 0.7 cm (10, 55). Vague   indeterminate lesion in segment 7 measuring 1.1 x 1.2 cm (10:21) and   additional indeterminate lesion in segment 6 measuring 1.8 x 1.9 cm   (10:39). subcentimeter hypodensities too small to characterize.  BILE DUCTS: Normal caliber.  GALLBLADDER: Cholelithiasis.  SPLEEN: Within normal limits.  PANCREAS: Within normal limits.  ADRENALS: Within normal limits.  KIDNEYS/URETERS: Bilateral subcentimeter hypodensities, too small to   characterize. Symmetric renal enhancement. No hydronephrosis.    BLADDER: Within normal limits.  REPRODUCTIVE ORGANS: Enlarged prostate measuring 6 cm in transverse   diameter.    BOWEL: No bowel obstruction. Appendix is normal.  PERITONEUM/RETROPERITONEUM: Within normal limits.  VESSELS: Atherosclerotic changes.  LYMPH NODES: No lymphadenopathy.  ABDOMINAL WALL: Small amount of subcutaneous emphysema along the right   hip.  BONES: Degenerative changes.    IMPRESSION:  No pulmonary embolism.    Subtle central predominant groundglass opacities with septal thickening,   which are nonspecific but may be seen in setting of pulmonary edema.    Multiple liver lesions, likely a combination of cysts and hemangioma.   Additional indeterminate lesions in the right hepatic lobe, for further   evaluation with MRI with and without contrast may be obtained.              --- End of Report ---      < end of copied text >  < from: CT Head No Cont (05.19.25 @ 11:32) >    ACC: [id number] EXAM:  CT BRAIN   ORDERED BY: RUSTAM PERES     PROCEDURE DATE:  05/19/2025          INTERPRETATION:  Exam Date: 5/19/2025 11:32 AM    CT head without IV contrast    CLINICAL INFORMATION:  interval scan post thrombectomy Admitting Dxs:   I63.9 CODE STROKE    TECHNIQUE: Contiguous axial sections were obtained through the head.     Coronal and sagittal reformats were obtained.    COMPARISON: CT head 5/18/2025 at 10:41 AM    FINDINGS:    Hypoattenuation in the right basal ganglia and right periinsular cortex,   compatible with acute infarct. No associated hemorrhage or mass effect.   Small chronic infarct in the right frontal white matter adjacent to the   right frontal horn is reidentified. No mass effect is found in the brain.  No hydrocephalus.    Rest of the findings are unchanged.    IMPRESSION:    Hypoattenuation in the right basal ganglia and right periinsular cortex,   compatible with acute infarct. No associated hemorrhage or mass effect.    Small chronic infarct in the right frontal white matter adjacent to the   right frontal horn is reidentified.    --- End of Report ---    < end of copied text >    OTHER: 	< from: TTE W or WO Ultrasound Enhancing Agent (05.19.25 @ 09:02) >    TRANSTHORACIC ECHOCARDIOGRAM REPORT  ________________________________________________________________________________                                      _______       Pt. Name:       TIFFANY SANCHEZ Study Date:    5/19/2025  MRN:            [id number]   YOB: 1970  Accession #:    [id number]    Age:           54 years  Account#:       [id number] Gender:        M  Heart Rate:     95 bpm       Height:        69.00 in (175.26 cm)  Rhythm:                      Weight:        125.00 lb(56.70 kg)  Blood Pressure: 156/99 mmHg  BSA/BMI:       1.69 m² / 18.46 kg/m²  ________________________________________________________________________________________  Referring Physician:    [id number] Nishi Hein  Interpreting Physician: Madelaine Chen MD  Primary Sonographer:    Nena Moffett [facility], [place]    CPT:                ECHO TTE WITH CON COMP W DOPP - .m;DEFINITY ECHO                      CONTRAST PER ML - .m;DEFINITY ECHO CONTRAST PER ML                      WASTED - .m  Indication(s):      Abnormal electrocardiogram ECG EKG - R94.31  Procedure:          Transthoracic echocardiogram with 2-D, M-mode and complete                      spectral and color flow Doppler.  Ordering Location:  [facility]  Admission Status:   Inpatient  Contrast Injection: Verbal consent was obtained for injection of Ultrasonic                      Enhancing Agent following a discussion of risks and                      benefits.                      Endocardial visualization enhanced with 2 ml of Definity                      Ultrasound enhancing agent (Lot#:[id number] Exp.Date:2025-APR                      Discarded Dose:8ml).  UEA Reaction:       Patient had no adverse reaction after injection of                      Ultrasound Enhancing Agent.  Agitated Saline:    Injection with agitated saline was performed to evaluate for                      intracardiac shunting.  Study Information:  Image quality for this study is adequate.    _______________________________________________________________________________________     CONCLUSIONS:      1. Left ventricular cavity is mildly dilated. Left ventricular systolic function is severely decreased with an ejection fraction of 15 % by Greene's method of disks. Global left ventricular hypokinesis.   2. There is severe (grade 3) left ventricular diastolic dysfunction, with elevated left ventricular filling pressure.   3. There is increased LV mass and eccentric hypertrophy.   4. Normal right ventricular cavity size, with normal wall thickness,and reduced right ventricular systolic function.   5. Mild to moderate mitral regurgitation. Mechanism of mitral regurgitation: The mechanism of mitral regurgitation is secondary due to left venrticular dysfunction.   6. No pericardial effusion seen.   7. Agitated saline injection was negative for intracardiac shunt.   8. Pulmonary artery systolic pressure could not be estimated.   9. Findings were discussed with LUIS FERNANDO Pleitez on 5/19/2025.    < end of copied text >    	  LABS:	 	    CARDIAC MARKERS:                                  14.1   5.41  )-----------( 259      ( 20 May 2025 05:56 )             43.4     05-20    138  |  103  |  11  ----------------------------<  96  3.9   |  20[L]  |  0.97    Ca    9.1      20 May 2025 05:56  Phos  3.1     05-20  Mg     2.0     05-20    TPro  6.6  /  Alb  3.7  /  TBili  0.4  /  DBili  x   /  AST  22  /  ALT  22  /  AlkPhos  86  05-18    proBNP:   Lipid Profile:   HgA1c:   TSH:

## 2025-05-20 NOTE — PROGRESS NOTE ADULT - SUBJECTIVE AND OBJECTIVE BOX
Neuro IR-PA-C  Daily note       This is a 54 year old right hand dominant male with a PMHx significant for HTN presenting as code stroke with left sided weakness. Patient was driving, pulled over due to chest pain and SOB. Neighbor called EMS after finding him on the floor with slurred speech and left sided weakness. The team spoke with patient's wife Daiana, who saw him this morning at his baseline. He was complaining of SOB. Patient denies being on AC/AP. Patient is POD#2 status post catheter cerebral angiogram and mechanical thrombectomy. Patient is currently sitting in a chair, seen during morning rounds.       INTERVAL HPI OVERNIGHT EVENTS:  54 year old male POD#2 s/post seen lying comfortably in bed. Tolerating diet. Passing gas/BM. Voiding. Pizano in with __ clear urine. Denies headache, weakness, numbness, n/v/d, fevers, chills, chest pain, SOB.     Vital Signs Last 24 Hrs  T(C): 36.7 (20 May 2025 07:00), Max: 37.1 (19 May 2025 19:00)  T(F): 98.1 (20 May 2025 07:00), Max: 98.8 (19 May 2025 19:00)  HR: 91 (20 May 2025 10:00) (83 - 110)  BP: 143/93 (20 May 2025 10:00) (121/83 - 158/105)  BP(mean): 112 (20 May 2025 10:00) (97 - 126)  RR: 26 (20 May 2025 10:00) (16 - 29)  SpO2: 98% (20 May 2025 10:00) (92% - 100%)    Parameters below as of 20 May 2025 07:00  Patient On (Oxygen Delivery Method): room air      PHYSICAL EXAM:  GENERAL: NAD, well-groomed, well-developed  HEAD:  Atraumatic, normocephalic  WOUND: Right groin, soft, no ecchymosis   MENTAL STATUS: A A O x 3, Appropriately conversant, Following simple commands, recent and remote memory intact,   CRANIAL NERVES: PERRL. EOMI without nystagmus. Facial sensation intact V1-3 distribution bilateral. Tongue midline. Hearing grossly intact. Speech clear. Head turning and shoulder shrug intact.   REFLEXES: Left pronator drift   MOTOR: strength  b/l upper and lower extremities  Uppers     Delt     Bicep     Tricep     HG  R                5/5       5/5         5/5         5/5  L                5/5       5/5         5/5         5/5  Lowers      HF     KF      KE     PF     DF       R             5/5     5/5     5/5    5/5   5/5     L              5/5    5/5      5/5    5/5   5/5     SENSATION: grossly intact to light touch all extremities  ABDOMEN: Soft, nontender, nondistended  EXTREMITIES:  2+ peripheral pulses, no calf tenderness bilaterally   SKIN: Warm, dry; no rashes or lesions    LABS:                        14.1   5.41  )-----------( 259      ( 20 May 2025 05:56 )             43.4     05-20    138  |  103  |  11  ----------------------------<  96  3.9   |  20[L]  |  0.97    Ca    9.1      20 May 2025 05:56  Phos  3.1     05-20  Mg     2.0     05-20    TPro  6.6  /  Alb  3.7  /  TBili  0.4  /  DBili  x   /  AST  22  /  ALT  22  /  AlkPhos  86  05-18    PT/INR - ( 18 May 2025 10:31 )   PT: 14.4 sec;   INR: 1.27 ratio         PTT - ( 18 May 2025 10:31 )  PTT:29.5 sec  Urinalysis Basic - ( 20 May 2025 05:56 )    Color: x / Appearance: x / SG: x / pH: x  Gluc: 96 mg/dL / Ketone: x  / Bili: x / Urobili: x   Blood: x / Protein: x / Nitrite: x   Leuk Esterase: x / RBC: x / WBC x   Sq Epi: x / Non Sq Epi: x / Bacteria: x      05-19 @ 07:01  -  05-20 @ 07:00  --------------------------------------------------------  IN: 690 mL / OUT: 1700 mL / NET: -1010 mL      ACC: [id number] EXAM:  CT BRAIN    PROCEDURE DATE:  05/19/2025    INTERPRETATION:  Exam Date: 5/19/2025 11:32 AM  CT head without IV contrast  CLINICAL INFORMATION:  interval scan post thrombectomy Admitting Dxs:   I63.9 CODE STROKE    TECHNIQUE: Contiguous axial sections were obtained through the head.     Coronal and sagittal reformats were obtained.    COMPARISON: CT head 5/18/2025 at 10:41 AM    FINDINGS:  Hypoattenuation in the right basal ganglia and right periinsular cortex,   compatible with acute infarct. No associated hemorrhage or mass effect.   Small chronic infarct in the right frontal white matter adjacent to the   right frontal horn is reidentified. No mass effect is found in the brain.  No hydrocephalus.    Rest of the findings are unchanged.    IMPRESSION:    Hypoattenuation in the right basal ganglia and right periinsular cortex,   compatible with acute infarct. No associated hemorrhage or mass effect.    Small chronic infarct in the right frontal white matter adjacent to the   right frontal horn is reidentified.   Neuro IR-PA-C  Daily note       This is a 54 year old right hand dominant male with a past medical history significant for HTNtroke with left sided weakness. Patient was driving, pulled over due to chest pain and SOB. Neighbor called EMS after finding him on the floor with slurred speech and left sided weakness. The team spoke with patient's wife Daiana, who saw him this morning at his baseline. He was complaining of SOB. Patient denies being on AC/AP. Patient is POD#2 status post catheter cerebral angiogram and mechanical thrombectomy. Patient is currently sitting in a chair, seen during morning rounds.     INTERVAL HPI OVERNIGHT EVENTS:  This is a 54 year old right hand dominant male with a right M1 occlusion POD#2 s/post catheter cerebral angiogram and mechanical thrombectomy sitting in chair. Patient's wife at bedside.       Vital Signs Last 24 Hrs  T(C): 36.7 (20 May 2025 07:00), Max: 37.1 (19 May 2025 19:00)  T(F): 98.1 (20 May 2025 07:00), Max: 98.8 (19 May 2025 19:00)  HR: 91 (20 May 2025 10:00) (83 - 110)  BP: 143/93 (20 May 2025 10:00) (121/83 - 158/105)  BP(mean): 112 (20 May 2025 10:00) (97 - 126)  RR: 26 (20 May 2025 10:00) (16 - 29)  SpO2: 98% (20 May 2025 10:00) (92% - 100%)    Parameters below as of 20 May 2025 07:00  Patient On (Oxygen Delivery Method): room air    PHYSICAL EXAM:  GENERAL: NAD, well-groomed, well-developed  HEAD:  Atraumatic, normocephalic  WOUND: Right groin, soft, no ecchymosis   MENTAL STATUS: A A O x 3, Appropriately conversant, Following simple commands, recent and remote memory intact,   CRANIAL NERVES: PERRL. EOMI without nystagmus. Left facial droop, Facial sensation intact V1-3 distribution bilateral. Tongue midline. Hearing grossly intact. Speech clear. Head turning and shoulder shrug intact.   REFLEXES: Left pronator drift upper and lower    MOTOR: strength  right upper and lower extremities 5/5   strength left upper and lower 4+/5   SENSATION: grossly intact to light touch all extremities  ABDOMEN: Soft, nontender, nondistended  EXTREMITIES:  2+ peripheral pulses, no calf tenderness bilaterally   SKIN: Warm, dry; no rashes or lesions    LABS:                        14.1   5.41  )-----------( 259      ( 20 May 2025 05:56 )             43.4     05-20    138  |  103  |  11  ----------------------------<  96  3.9   |  20[L]  |  0.97    Ca    9.1      20 May 2025 05:56  Phos  3.1     05-20  Mg     2.0     05-20    TPro  6.6  /  Alb  3.7  /  TBili  0.4  /  DBili  x   /  AST  22  /  ALT  22  /  AlkPhos  86  05-18    PT/INR - ( 18 May 2025 10:31 )   PT: 14.4 sec;   INR: 1.27 ratio         PTT - ( 18 May 2025 10:31 )  PTT:29.5 sec  Urinalysis Basic - ( 20 May 2025 05:56 )    Color: x / Appearance: x / SG: x / pH: x  Gluc: 96 mg/dL / Ketone: x  / Bili: x / Urobili: x   Blood: x / Protein: x / Nitrite: x   Leuk Esterase: x / RBC: x / WBC x   Sq Epi: x / Non Sq Epi: x / Bacteria: x      05-19 @ 07:01  -  05-20 @ 07:00  --------------------------------------------------------  IN: 690 mL / OUT: 1700 mL / NET: -1010 mL      ACC: [id number] EXAM:  CT BRAIN    PROCEDURE DATE:  05/19/2025    INTERPRETATION:  Exam Date: 5/19/2025 11:32 AM  CT head without IV contrast  CLINICAL INFORMATION:  interval scan post thrombectomy Admitting Dxs:   I63.9 CODE STROKE    TECHNIQUE: Contiguous axial sections were obtained through the head.     Coronal and sagittal reformats were obtained.    COMPARISON: CT head 5/18/2025 at 10:41 AM    FINDINGS:  Hypoattenuation in the right basal ganglia and right periinsular cortex,   compatible with acute infarct. No associated hemorrhage or mass effect.   Small chronic infarct in the right frontal white matter adjacent to the   right frontal horn is reidentified. No mass effect is found in the brain.  No hydrocephalus.    Rest of the findings are unchanged.    IMPRESSION:    Hypoattenuation in the right basal ganglia and right periinsular cortex,   compatible with acute infarct. No associated hemorrhage or mass effect.    Small chronic infarct in the right frontal white matter adjacent to the   right frontal horn is reidentified.   Neuro IR-PA-C  Daily note       This is a 54 year old right hand dominant male with a past medical history significant for HTNtroke with left sided weakness. Patient was driving, pulled over due to chest pain and SOB. Neighbor called EMS after finding him on the floor with slurred speech and left sided weakness. The team spoke with patient's wife Daiana, who saw him this morning at his baseline. He was complaining of SOB. Patient denies being on AC/AP. Patient is POD#2 status post catheter cerebral angiogram and mechanical thrombectomy. Patient is currently sitting in a chair, seen during morning rounds.     INTERVAL HPI OVERNIGHT EVENTS:  This is a 54 year old right hand dominant male with a right M1 occlusion POD#2 s/post catheter cerebral angiogram and mechanical thrombectomy sitting in chair. Patient's wife at bedside.       Vital Signs Last 24 Hrs  T(C): 36.7 (20 May 2025 07:00), Max: 37.1 (19 May 2025 19:00)  T(F): 98.1 (20 May 2025 07:00), Max: 98.8 (19 May 2025 19:00)  HR: 91 (20 May 2025 10:00) (83 - 110)  BP: 143/93 (20 May 2025 10:00) (121/83 - 158/105)  BP(mean): 112 (20 May 2025 10:00) (97 - 126)  RR: 26 (20 May 2025 10:00) (16 - 29)  SpO2: 98% (20 May 2025 10:00) (92% - 100%)    Parameters below as of 20 May 2025 07:00  Patient On (Oxygen Delivery Method): room air    PHYSICAL EXAM:  GENERAL: NAD, well-groomed, well-developed  HEAD:  Atraumatic, normocephalic  WOUND: Right groin, soft, no ecchymosis   MENTAL STATUS: A A O x 3, Appropriately conversant, Following simple commands, recent and remote memory intact,   CRANIAL NERVES: PERRL. Left facial droop, Facial sensation intact V1-3 distribution bilateral. Tongue midline. Hearing grossly intact. Speech clear. Head turning and shoulder shrug intact.   REFLEXES: Left pronator drift upper and lower    MOTOR: strength  right upper and lower extremities 5/5   strength left upper and lower 4+/5   SENSATION: grossly intact to light touch all extremities  ABDOMEN: Soft, nontender, nondistended  EXTREMITIES:  2+ peripheral pulses, no calf tenderness bilaterally   SKIN: Warm, dry; no rashes or lesions    LABS:                        14.1   5.41  )-----------( 259      ( 20 May 2025 05:56 )             43.4     05-20    138  |  103  |  11  ----------------------------<  96  3.9   |  20[L]  |  0.97    Ca    9.1      20 May 2025 05:56  Phos  3.1     05-20  Mg     2.0     05-20    TPro  6.6  /  Alb  3.7  /  TBili  0.4  /  DBili  x   /  AST  22  /  ALT  22  /  AlkPhos  86  05-18    PT/INR - ( 18 May 2025 10:31 )   PT: 14.4 sec;   INR: 1.27 ratio         PTT - ( 18 May 2025 10:31 )  PTT:29.5 sec  Urinalysis Basic - ( 20 May 2025 05:56 )    Color: x / Appearance: x / SG: x / pH: x  Gluc: 96 mg/dL / Ketone: x  / Bili: x / Urobili: x   Blood: x / Protein: x / Nitrite: x   Leuk Esterase: x / RBC: x / WBC x   Sq Epi: x / Non Sq Epi: x / Bacteria: x      05-19 @ 07:01  -  05-20 @ 07:00  --------------------------------------------------------  IN: 690 mL / OUT: 1700 mL / NET: -1010 mL      ACC: [id number] EXAM:  CT BRAIN    PROCEDURE DATE:  05/19/2025    INTERPRETATION:  Exam Date: 5/19/2025 11:32 AM  CT head without IV contrast  CLINICAL INFORMATION:  interval scan post thrombectomy Admitting Dxs:   I63.9 CODE STROKE    TECHNIQUE: Contiguous axial sections were obtained through the head.     Coronal and sagittal reformats were obtained.    COMPARISON: CT head 5/18/2025 at 10:41 AM    FINDINGS:  Hypoattenuation in the right basal ganglia and right periinsular cortex,   compatible with acute infarct. No associated hemorrhage or mass effect.   Small chronic infarct in the right frontal white matter adjacent to the   right frontal horn is reidentified. No mass effect is found in the brain.  No hydrocephalus.    Rest of the findings are unchanged.    IMPRESSION:    Hypoattenuation in the right basal ganglia and right periinsular cortex,   compatible with acute infarct. No associated hemorrhage or mass effect.    Small chronic infarct in the right frontal white matter adjacent to the   right frontal horn is reidentified.

## 2025-05-20 NOTE — PROVIDER CONTACT NOTE (SEPSIS SCREENING) - NOTIFICATION DETAILS (SBAR) SITUATION:
Pt tachycardic while sleeping (around 100-110) and while standing, pt tachy to 133. Pt denies chest pain and denies shortness of breath. Pt oral temp 101.2 . /95. Satting % on RA.

## 2025-05-20 NOTE — DIETITIAN INITIAL EVALUATION ADULT - PERTINENT LABORATORY DATA
05-20    138  |  103  |  11  ----------------------------<  96  3.9   |  20[L]  |  0.97    Ca    9.1      20 May 2025 05:56  Phos  3.1     05-20  Mg     2.0     05-20    TPro  6.6  /  Alb  3.7  /  TBili  0.4  /  DBili  x   /  AST  22  /  ALT  22  /  AlkPhos  86  05-18  A1C with Estimated Average Glucose Result: 5.6 % (05-18-25 @ 13:49)

## 2025-05-20 NOTE — DIETITIAN INITIAL EVALUATION ADULT - PERTINENT MEDS FT
MEDICATIONS  (STANDING):  aspirin  chewable 81 milliGRAM(s) Oral daily  atorvastatin 80 milliGRAM(s) Oral at bedtime  enoxaparin Injectable 40 milliGRAM(s) SubCutaneous <User Schedule>  nicotine -  14 mG/24Hr(s) Patch 1 Patch Transdermal daily  polyethylene glycol 3350 17 Gram(s) Oral two times a day  senna 2 Tablet(s) Oral at bedtime  tamsulosin 0.4 milliGRAM(s) Oral at bedtime    MEDICATIONS  (PRN):  acetaminophen     Tablet .. 650 milliGRAM(s) Oral every 6 hours PRN Temp greater or equal to 38.5C (101.3F), Mild Pain (1 - 3)

## 2025-05-20 NOTE — DIETITIAN INITIAL EVALUATION ADULT - ORAL INTAKE PTA/DIET HISTORY
PTA per pt  -Intake: good PO intake; follow low sodium diet  -Chewing/Swallowing: denies hx of difficulty   -Allergies/Intolerances: NKFA   -Vitamins/Supplements: denies use

## 2025-05-20 NOTE — CONSULT NOTE ADULT - ASSESSMENT
54 year old right hand dominant male with a PMHx significant for HTN presenting as code stroke with left sided weakness. Patient was driving, pulled over due to chest pain and SOB. Neighbor called EMS after finding him on the floor with slurred speech and left sided weakness. The team spoke with patient's wife Daiana, who saw him this morning at his baseline. He was complaining of SOB. Patient denies being on AC/AP. Patient is POD#2 status post catheter cerebral angiogram and mechanical thrombectomy.

## 2025-05-20 NOTE — DIETITIAN INITIAL EVALUATION ADULT - ADD RECOMMEND
1) Continue regular diet  2) multivitamin daily   3) Monitor PO intake, diet tolerance, weight trends, labs, GI function, and skin integrity    Valentina Johnson MS RDN CDN CCTD (Teams)

## 2025-05-20 NOTE — PROGRESS NOTE ADULT - ASSESSMENT
54y (1970) RH man with a PMHx significant for HTN presenting as code stroke for Left sided weakness. On exam, left arm and leg drift, left facial palsy. Spoke with wife bedside. CT CAP reviewed, will consult medicine. Cardiology consult for low EF. Will need to be on AC, first will get MRI B     Impression: Significant improvement Left hemiplegia, LHH, mild dysfluency with severe dysarthria, right gaze preference due to acute ischemic stroke, R M1 occlusion s/p tenecteplase and thrombectomy TICI 3 Mechanism embolic due to low EF    Plan:  NEURO: Continue close monitoring for neurologic deterioration, MRI Brain w/o pending. If MRI B not done, then repeat CTH.  HgbA1C 5.6, LDL 96    ANTITHROMBOTIC THERAPY: asa 81 mg daily    PULMONARY: CXR clear, protecting airway, saturating well     CARDIOVASCULAR:  TTE shows EF 15%, cardiology consult. No need for JACEK. Will need AC, will decide after MRI B to evaluate stroke burden, cardiac monitoring                              SBP goal: <180/105    GASTROINTESTINAL: passed dysphagia screen , CT AP shows Multiple liver lesions, will consult medicine for assistance      Diet: reg     RENAL: BUN/Cr within normal limits, good urine output      Na Goal: Greater than 135     Pizano: No    HEMATOLOGY: H/H without change, Platelets normal. L soleal DVT, appreciate Vascular recommendations. Repeat LE dopplers in 3-5 days.    Hypercoagulable panel sent      DVT ppx: Heparin s.c [] LMWH [x]     ID: afebrile, no leukocytosis     OTHER: Plan discussed with patient and family at bedside, all questions and concerns addressed.     DISPOSITION: Rehab or home depending on PT eval once stable and workup is complete    Case & Plan discussed with patient and family. All questions answered.    CORE MEASURES:         Admission NIHSS: 17     Tenecteplase: [x] YES [] NO     Statin Therapy: [x] YES [] NO     Smoking [x] YES [] NO     Afib [] YES [x] NO     Stroke Education [x] YES [] NO    Dysphagia screen : [x] Passed [] Failed- S&S pending [] Pending    DVT ppx: Venodynes [] Heparin s.c [x] LMWH.        54y (1970) RH man with a PMHx significant for HTN presenting as code stroke for Left sided weakness. On exam, left arm and leg drift, left facial palsy. Spoke with wife bedside. CT CAP reviewed, will consult medicine. Cardiology consult for low EF. 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     PULMONARY: CXR clear, protecting airway, saturating well     CARDIOVASCULAR:  TTE shows EF 15%, cardiology consult. No need for JACEK. MR brain stable, started eliquis 5mg bid                       SBP goal: <180/105    GASTROINTESTINAL: passed dysphagia screen , CT AP shows Multiple liver lesions, will consult medicine for assistance      Diet: reg     RENAL: BUN/Cr within normal limits, good urine output      Na Goal: Greater than 135     Pizano: No    HEMATOLOGY: H/H without change, Platelets normal. L soleal DVT, appreciate Vascular recommendations. Repeat LE dopplers in 3-5 days.    Hypercoagulable panel sent      DVT ppx: eliquis 5mg BID    ID: afebrile, no leukocytosis     OTHER: Plan discussed with patient and family at bedside, all questions and concerns addressed.     DISPOSITION: Rehab or home depending on PT eval once stable and workup is complete    Case & Plan discussed with patient and family. All questions answered.    CORE MEASURES:         Admission NIHSS: 17     Tenecteplase: [x] YES [] NO     Statin Therapy: [x] YES [] NO     Smoking [x] YES [] NO     Afib [] YES [x] NO     Stroke Education [x] YES [] NO    Dysphagia screen : [x] Passed [] Failed- S&S pending [] Pending    DVT ppx: Venodynes [] Heparin s.c [x] LMWH.        54y (1970) RH man with a PMHx significant for HTN presenting as code stroke for Left sided weakness. On exam, left arm and leg drift, left facial palsy. Spoke with wife bedside. CT CAP reviewed, will consult medicine. Cardiology consult for low EF. 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     PULMONARY: CXR clear, protecting airway, saturating well. CT CAP shows possible pulmonary edema. No signs of respiratory distress and patient is saturating well above 95% on pulse ox. consulted pulmonary team    CARDIOVASCULAR:  TTE shows EF 15%, cardiology consult. No need for JACEK. MR brain stable, started eliquis 5mg bid                       SBP goal: <180/105    GASTROINTESTINAL: passed dysphagia screen , CT AP shows Multiple liver lesions, medicine consulted. GI consulted, recommended MR brain w/w/o (ordered)     Diet: reg     RENAL: BUN/Cr within normal limits, good urine output      Na Goal: Greater than 135     Pizano: No    HEMATOLOGY: H/H without change, Platelets normal. L soleal DVT, appreciate Vascular recommendations. Repeat LE dopplers in 3-5 days.    Hypercoagulable panel sent      DVT ppx: eliquis 5mg BID    ID: afebrile, no leukocytosis     OTHER: Plan discussed with patient and family at bedside, all questions and concerns addressed.     DISPOSITION: Rehab or home depending on PT eval once stable and workup is complete    Case & Plan discussed with patient and family. All questions answered.    CORE MEASURES:         Admission NIHSS: 17     Tenecteplase: [x] YES [] NO     Statin Therapy: [x] YES [] NO     Smoking [x] YES [] NO     Afib [] YES [x] NO     Stroke Education [x] YES [] NO    Dysphagia screen : [x] Passed [] Failed- S&S pending [] Pending    DVT ppx: Venodynes [] Heparin s.c [x] LMWH.        54y (1970) RH man with a PMHx significant for HTN presenting as code stroke for Left sided weakness. On exam, left arm and leg drift, left facial palsy. Spoke with wife bedside. CT CAP reviewed, will consult medicine. Cardiology consult for low EF. 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     PULMONARY: CXR clear, protecting airway, saturating well. CT CAP shows possible pulmonary edema. No signs of respiratory distress and patient is saturating well above 95% on pulse ox. consulted pulmonary team    CARDIOVASCULAR:  TTE shows EF 15%, cardiology consult. No need for JACEK. MR brain stable, started eliquis 5mg bid                       SBP goal: <180/105    GASTROINTESTINAL: passed dysphagia screen , CT AP shows Multiple liver lesions, medicine consulted. GI consulted, recommended MR brain w/w/o (ordered)     Diet: reg     RENAL: BUN/Cr within normal limits, good urine output      Na Goal: Greater than 135     Pizano: No    HEMATOLOGY: H/H without change, Platelets normal. L soleal DVT, appreciate Vascular recommendations. Repeat LE dopplers in 3-5 days.    Hypercoagulable panel sent      DVT ppx: eliquis 5mg BID    ID: afebrile, no leukocytosis     OTHER: Plan discussed with patient and family at bedside, all questions and concerns addressed. Smoking cessation education provided. Provided patient nicotine patch, and patient accepted.    DISPOSITION: Rehab or home depending on PT eval once stable and workup is complete    Case & Plan discussed with patient and family. All questions answered.    CORE MEASURES:         Admission NIHSS: 17     Tenecteplase: [x] YES [] NO     Statin Therapy: [x] YES [] NO     Smoking [x] YES [] NO     Afib [] YES [x] NO     Stroke Education [x] YES [] NO    Dysphagia screen : [x] Passed [] Failed- S&S pending [] Pending    DVT ppx: pt on eliquis

## 2025-05-20 NOTE — CONSULT NOTE ADULT - SUBJECTIVE AND OBJECTIVE BOX
Name of Patient : TIFFANY SANCHEZ  MRN: [id number]  Date of visit: 05-20-25 @ 11:25      Subjective: Patient seen and examined. No new events except as noted.     REVIEW OF SYSTEMS:        MEDICATIONS:  MEDICATIONS  (STANDING):  aspirin  chewable 81 milliGRAM(s) Oral daily  atorvastatin 80 milliGRAM(s) Oral at bedtime  diazepam    Tablet 2 milliGRAM(s) Oral once  enoxaparin Injectable 40 milliGRAM(s) SubCutaneous <User Schedule>  nicotine -  14 mG/24Hr(s) Patch 1 Patch Transdermal daily  polyethylene glycol 3350 17 Gram(s) Oral two times a day  senna 2 Tablet(s) Oral at bedtime  tamsulosin 0.4 milliGRAM(s) Oral at bedtime      PHYSICAL EXAM:  T(C): 36.7 (05-20-25 @ 11:00), Max: 37.1 (05-19-25 @ 19:00)  HR: 89 (05-20-25 @ 11:00) (83 - 110)  BP: 141/82 (05-20-25 @ 11:00) (121/83 - 158/105)  RR: 21 (05-20-25 @ 11:00) (16 - 29)  SpO2: 98% (05-20-25 @ 11:00) (92% - 100%)  Wt(kg): --  I&O's Summary    19 May 2025 07:01  -  20 May 2025 07:00  --------------------------------------------------------  IN: 690 mL / OUT: 1700 mL / NET: -1010 mL    20 May 2025 07:01  -  20 May 2025 11:25  --------------------------------------------------------  IN: 0 mL / OUT: 500 mL / NET: -500 mL          Appearance: Normal	  HEENT:  PERRLA   Lymphatic: No lymphadenopathy   Cardiovascular: Normal S1 S2, no JVD  Respiratory: normal effort , clear  Gastrointestinal:  Soft, Non-tender  Skin: No rashes,  warm to touch  Psychiatry:  Mood & affect appropriate  Musculuskeletal: No edema    recent labs, Imaging and EKGs personally reviewed   CODE status discussed with the patient in detail            05-19-25 @ 07:01  -  05-20-25 @ 07:00  --------------------------------------------------------  IN: 690 mL / OUT: 1700 mL / NET: -1010 mL    05-20-25 @ 07:01  -  05-20-25 @ 11:25  --------------------------------------------------------  IN: 0 mL / OUT: 500 mL / NET: -500 mL             Name of Patient : TIFFANY SANCHEZ  MRN: [id number]  Date of visit: 25 @ 11:25    Patient is a 54 year old right hand dominant male with a PMHx significant for HTN presenting as code stroke with left sided weakness. Patient was driving, pulled over due to chest pain and SOB. Neighbor called EMS after finding him on the floor with slurred speech and left sided weakness. The team spoke with patient's wife Daiana, who saw him this morning at his baseline. He was complaining of SOB. Patient denies being on AC/AP. Patient is POD#2 status post catheter cerebral angiogram and mechanical thrombectomy.   Subjective: Patient seen and examined. No new events except as noted.       MEDICATIONS:  MEDICATIONS  (STANDING):  aspirin  chewable 81 milliGRAM(s) Oral daily  atorvastatin 80 milliGRAM(s) Oral at bedtime  diazepam    Tablet 2 milliGRAM(s) Oral once  enoxaparin Injectable 40 milliGRAM(s) SubCutaneous <User Schedule>  nicotine -  14 mG/24Hr(s) Patch 1 Patch Transdermal daily  polyethylene glycol 3350 17 Gram(s) Oral two times a day  senna 2 Tablet(s) Oral at bedtime  tamsulosin 0.4 milliGRAM(s) Oral at bedtime    Home Medications:  Cipro 500 mg oral tablet: 1 tab(s) orally every 12 hours (07 Oct 2020 10:09)  Flomax 0.4 mg oral capsule: 1 cap(s) orally once a day (07 Oct 2020 10:09)      PAST MEDICAL & SURGICAL HISTORY:  HTN (hypertension)      No significant past surgical history            PHYSICAL EXAM:  T(C): 36.7 (25 @ 11:00), Max: 37.1 (25 @ 19:00)  HR: 89 (25 @ 11:00) (83 - 110)  BP: 141/82 (25 @ 11:00) (121/83 - 158/105)  RR: 21 (25 @ 11:00) (16 - 29)  SpO2: 98% (25 @ 11:00) (92% - 100%)  Wt(kg): --  I&O's Summary    19 May 2025 07:01  -  20 May 2025 07:00  --------------------------------------------------------  IN: 690 mL / OUT: 1700 mL / NET: -1010 mL    20 May 2025 07:01  -  20 May 2025 11:25  --------------------------------------------------------  IN: 0 mL / OUT: 500 mL / NET: -500 mL          Appearance: Normal	  HEENT:  PERRLA   Lymphatic: No lymphadenopathy   Cardiovascular: Normal S1 S2, no JVD  Respiratory: normal effort , clear  Gastrointestinal:  Soft, Non-tender  Skin: No rashes,  warm to touch  Psychiatry:  Mood & affect appropriate  Musculuskeletal: No edema    recent labs, Imaging and EKGs personally reviewed   CODE status discussed with the patient in detail    25 @ 07:  -  25 @ 07:00  --------------------------------------------------------  IN: 690 mL / OUT: 1700 mL / NET: -1010 mL    25 @ 07:01  -  25 @ 11:25  --------------------------------------------------------  IN: 0 mL / OUT: 500 mL / NET: -500 mL                          14.2   7.69  )-----------( 241      ( 20 May 2025 18:04 )             41.1               05-    139  |  103  |  13  ----------------------------<  87  3.8   |  21[L]  |  0.92    Ca    9.2      20 May 2025 18:03  Phos  3.1     05-20  Mg     2.0     05-20    TPro  6.6  /  Alb  3.7  /  TBili  0.6  /  DBili  x   /  AST  18  /  ALT  17  /  AlkPhos  80  -                       Urinalysis Basic - ( 20 May 2025 18:19 )    Color: Yellow / Appearance: Clear / S.020 / pH: x  Gluc: x / Ketone: x  / Bili: Negative / Urobili: 1.0 mg/dL   Blood: x / Protein: Negative mg/dL / Nitrite: Negative   Leuk Esterase: Trace / RBC: 4 /HPF / WBC 2 /HPF   Sq Epi: x / Non Sq Epi: 1 /HPF / Bacteria: Negative /HPF

## 2025-05-20 NOTE — PROGRESS NOTE ADULT - ASSESSMENT
This is a 54 year old right hand dominant male with a right M1 occlusion POD#2 s/post catheter cerebral angiogram and mechanical thrombectomy.    1. Continue neuro checks  2. Continue right groin checks   3. OOB to chair  4. PT   5. MRI with gado pending   6. Plan to be discussed with Dr Hein  This is a 54 year old right hand dominant male with a right M1 occlusion POD#2 s/post catheter cerebral angiogram and mechanical thrombectomy.    1. Continue neuro checks  2. Continue right groin checks   3. OOB to chair  4. Continue physical therapy   5. MRI with gadolinium pending   6. Plan to be discussed with Dr Hein

## 2025-05-20 NOTE — DIETITIAN INITIAL EVALUATION ADULT - OTHER INFO
GI/INTAKE:   -Diet advanced this morning; awaiting tray   -No BM documented thus far; bowel regimen ordered (Miralax, Senna)  -Denies nausea/vomiting    RESP:   -Intubated/extubated   -Tolerating RA     NEURO:  -Code stroke ()   -S/p RM1 occlusion s/p mechanical thrombectomy TICI 3  -No acute hemorrhage at this time     WEIGHT HX:   -Current dosin pounds   -Per Pt, UBW: 140 pounds   -? accuracy of dosing weight; pt denies weight changes

## 2025-05-20 NOTE — DIETITIAN INITIAL EVALUATION ADULT - REASON FOR ADMISSION
"54M RH man with a PMHx significant for HTN, HLD, smoker. Presenting as code stroke for Left sided weakness, NIHSS 17. Patient was driving, pulled over due to chest pain and SOB. Neighbor called EMS after finding him on the floor with slurred speech and L sided weakness. Spoke with Wife Daiana, who saw him this morning at his baseline. He was complaining of SOB. Left hemiplegia, LHH, mild dysfluency with severe dysarthria, right gaze preference. CTH Age-indeterminate infarct in the right frontal white matter,  mL at risk ischemic tissue involving the right MCA. CTA R M1"

## 2025-05-20 NOTE — CONSULT NOTE ADULT - ASSESSMENT
Patient is a 54 year old right hand dominant male with a PMHx significant for HTN presenting as code stroke with left sided weakness. Patient was driving, pulled over due to chest pain and SOB. Neighbor called EMS after finding him on the floor with slurred speech and left sided weakness. The team spoke with patient's wife Daiana, who saw him this morning at his baseline. He was complaining of SOB. Patient denies being on AC/AP. Patient is POD#2 status post catheter cerebral angiogram and mechanical thrombectomy.     # CVA   S/P mechanical thrombectomy  neuro care appreciated   MRI noted for : Acute infarcts in the right basal ganglia/corona radiata and high   right paramedian frontal lobe.  Questionable punctate acute infarct in the left cerebellar hemisphere.  Chronic ischemic changes as discussed above.  LE DVT positive,, below knee  check Pn CT   on elqiuis   fall precautions     # HTN   BP contorl   adjust meds as toelrtaed

## 2025-05-21 DIAGNOSIS — R50.9 FEVER, UNSPECIFIED: ICD-10-CM

## 2025-05-21 DIAGNOSIS — I50.9 HEART FAILURE, UNSPECIFIED: ICD-10-CM

## 2025-05-21 DIAGNOSIS — R06.02 SHORTNESS OF BREATH: ICD-10-CM

## 2025-05-21 DIAGNOSIS — I82.409 ACUTE EMBOLISM AND THROMBOSIS OF UNSPECIFIED DEEP VEINS OF UNSPECIFIED LOWER EXTREMITY: ICD-10-CM

## 2025-05-21 DIAGNOSIS — J81.1 CHRONIC PULMONARY EDEMA: ICD-10-CM

## 2025-05-21 LAB
ANION GAP SERPL CALC-SCNC: 15 MMOL/L — SIGNIFICANT CHANGE UP (ref 5–17)
AT III ACT/NOR PPP CHRO: 108 % — SIGNIFICANT CHANGE UP (ref 85–135)
BUN SERPL-MCNC: 13 MG/DL — SIGNIFICANT CHANGE UP (ref 7–23)
CALCIUM SERPL-MCNC: 9.1 MG/DL — SIGNIFICANT CHANGE UP (ref 8.4–10.5)
CHLORIDE SERPL-SCNC: 104 MMOL/L — SIGNIFICANT CHANGE UP (ref 96–108)
CO2 SERPL-SCNC: 19 MMOL/L — LOW (ref 22–31)
CREAT SERPL-MCNC: 1.04 MG/DL — SIGNIFICANT CHANGE UP (ref 0.5–1.3)
DRVVT RATIO: 0.89 RATIO — SIGNIFICANT CHANGE UP (ref 0–1.21)
DRVVT SCREEN TO CONFIRM RATIO: SIGNIFICANT CHANGE UP
EGFR: 84 ML/MIN/1.73M2 — SIGNIFICANT CHANGE UP
EGFR: 84 ML/MIN/1.73M2 — SIGNIFICANT CHANGE UP
GLUCOSE SERPL-MCNC: 90 MG/DL — SIGNIFICANT CHANGE UP (ref 70–99)
HCT VFR BLD CALC: 40.9 % — SIGNIFICANT CHANGE UP (ref 39–50)
HGB BLD-MCNC: 13.8 G/DL — SIGNIFICANT CHANGE UP (ref 13–17)
MCHC RBC-ENTMCNC: 29.9 PG — SIGNIFICANT CHANGE UP (ref 27–34)
MCHC RBC-ENTMCNC: 33.7 G/DL — SIGNIFICANT CHANGE UP (ref 32–36)
MCV RBC AUTO: 88.5 FL — SIGNIFICANT CHANGE UP (ref 80–100)
NRBC BLD AUTO-RTO: 0 /100 WBCS — SIGNIFICANT CHANGE UP (ref 0–0)
PLATELET # BLD AUTO: 240 K/UL — SIGNIFICANT CHANGE UP (ref 150–400)
POTASSIUM SERPL-MCNC: 4 MMOL/L — SIGNIFICANT CHANGE UP (ref 3.5–5.3)
POTASSIUM SERPL-SCNC: 4 MMOL/L — SIGNIFICANT CHANGE UP (ref 3.5–5.3)
PROT C ACT/NOR PPP: 87 % — SIGNIFICANT CHANGE UP (ref 74–150)
RBC # BLD: 4.62 M/UL — SIGNIFICANT CHANGE UP (ref 4.2–5.8)
RBC # FLD: 13.6 % — SIGNIFICANT CHANGE UP (ref 10.3–14.5)
SODIUM SERPL-SCNC: 138 MMOL/L — SIGNIFICANT CHANGE UP (ref 135–145)
WBC # BLD: 5.86 K/UL — SIGNIFICANT CHANGE UP (ref 3.8–10.5)
WBC # FLD AUTO: 5.86 K/UL — SIGNIFICANT CHANGE UP (ref 3.8–10.5)

## 2025-05-21 PROCEDURE — 99221 1ST HOSP IP/OBS SF/LOW 40: CPT

## 2025-05-21 PROCEDURE — 99222 1ST HOSP IP/OBS MODERATE 55: CPT | Mod: 25

## 2025-05-21 PROCEDURE — 74183 MRI ABD W/O CNTR FLWD CNTR: CPT | Mod: 26

## 2025-05-21 RX ORDER — LACTOBACILLUS ACIDOPHILUS/PECT 75 MM-100
1 CAPSULE ORAL DAILY
Refills: 0 | Status: DISCONTINUED | OUTPATIENT
Start: 2025-05-21 | End: 2025-05-23

## 2025-05-21 RX ORDER — IPRATROPIUM BROMIDE AND ALBUTEROL SULFATE .5; 2.5 MG/3ML; MG/3ML
3 SOLUTION RESPIRATORY (INHALATION) EVERY 6 HOURS
Refills: 0 | Status: DISCONTINUED | OUTPATIENT
Start: 2025-05-21 | End: 2025-05-23

## 2025-05-21 RX ADMIN — Medication 250 MILLIGRAM(S): at 05:30

## 2025-05-21 RX ADMIN — NICOTINE POLACRILEX 1 PATCH: 4 GUM, CHEWING ORAL at 11:53

## 2025-05-21 RX ADMIN — Medication 1 TABLET(S): at 13:49

## 2025-05-21 RX ADMIN — APIXABAN 5 MILLIGRAM(S): 2.5 TABLET, FILM COATED ORAL at 05:29

## 2025-05-21 RX ADMIN — METOPROLOL SUCCINATE 25 MILLIGRAM(S): 50 TABLET, EXTENDED RELEASE ORAL at 05:29

## 2025-05-21 RX ADMIN — ATORVASTATIN CALCIUM 80 MILLIGRAM(S): 80 TABLET, FILM COATED ORAL at 22:04

## 2025-05-21 RX ADMIN — DAPAGLIFLOZIN 10 MILLIGRAM(S): 5 TABLET, FILM COATED ORAL at 11:53

## 2025-05-21 RX ADMIN — Medication 25 GRAM(S): at 13:51

## 2025-05-21 RX ADMIN — TAMSULOSIN HYDROCHLORIDE 0.4 MILLIGRAM(S): 0.4 CAPSULE ORAL at 22:04

## 2025-05-21 RX ADMIN — Medication 25 GRAM(S): at 05:30

## 2025-05-21 RX ADMIN — NICOTINE POLACRILEX 1 PATCH: 4 GUM, CHEWING ORAL at 07:43

## 2025-05-21 RX ADMIN — Medication 25 GRAM(S): at 22:10

## 2025-05-21 RX ADMIN — NICOTINE POLACRILEX 1 PATCH: 4 GUM, CHEWING ORAL at 16:46

## 2025-05-21 RX ADMIN — NICOTINE POLACRILEX 1 PATCH: 4 GUM, CHEWING ORAL at 20:41

## 2025-05-21 NOTE — SWALLOW BEDSIDE ASSESSMENT ADULT - SLP GENERAL OBSERVATIONS
Pt encountered awake and alert, OOB in chair, on RA, A&Ox4, vocal quality WNL, able to follow commands and make wants/needs known, no evidence of dysarthria.

## 2025-05-21 NOTE — CONSULT NOTE ADULT - ASSESSMENT
55 y/o M with PMH of HTN. Presents as a code stroke for L sided weakness. Pt was driving, pulled over due to chest pain and SOB. His neighbor called EMS after finding him on the floor with slurred speech and L sided weakness. Found to have R M1 occlusion s/p tenecteplase and mechanical thrombectomy. Also found to have severely decreased LVEF of 15% on echo w/ global LV hypokinesis, severe LV diastolic dysfunction, mild-mod MR. CTA chest neg for PE, notes subtle groundglass opacities likely mild pulmonary edema.  53 y/o M with PMH of HTN. Presents as a code stroke for L sided weakness. Pt was driving, pulled over due to chest pain and SOB. His neighbor called EMS after finding him on the floor with slurred speech and L sided weakness. Found to have R M1 occlusion s/p tenecteplase and mechanical thrombectomy. Also found to have severely decreased LVEF of 15% on echo w/ global LV hypokinesis, severe LV diastolic dysfunction, mild-mod MR. +LE DVT. CTA chest neg for PE, notes subtle groundglass opacities likely mild pulmonary edema. Course c/b fevers, tmax 101.2 F 5/20.

## 2025-05-21 NOTE — SWALLOW BEDSIDE ASSESSMENT ADULT - COMMENTS
Pulm consulted 5/21: "Also found to have severely decreased LVEF of 15% on echo w/ global LV hypokinesis, severe LV diastolic dysfunction, mild-mod MR. +LE DVT. CTA chest neg for PE, notes subtle groundglass opacities likely mild pulmonary edema. Course c/b fevers, tmax 101.2 F 5/20. Current smoker, pt reports 1 ppd x42 years. Denies hx of diagnosed lung disease, inhaler use. SOB now resolved. Denies CP, cough, sputum production."    seen by SLP on 5/18 for speech-language evaluation (pt had passed dysphagia screen and was on regular diet) with no communication deficits found    per stroke team, concern for aspiration in setting of fevers  SLP consulted to assess swallowing

## 2025-05-21 NOTE — PROGRESS NOTE ADULT - SUBJECTIVE AND OBJECTIVE BOX
INTERNAL MEDICINE PROGRESS NOTE     NAME OF PATIENT: TIFFANY SANCHEZ  MRN: [id number]  DATE OF VISIT: 05-21-25 @ 10:49    SUBJECTIVE/ ROS:  - Patient seen and examined by bedside     OBJECTIVE:  ICU Vital Signs Last 24 Hrs  T(C): 37.1 (21 May 2025 04:00), Max: 38.4 (20 May 2025 17:00)  T(F): 98.8 (21 May 2025 04:00), Max: 101.2 (20 May 2025 17:00)  HR: 82 (21 May 2025 08:00) (76 - 104)  BP: 116/77 (21 May 2025 08:00) (116/77 - 155/96)  BP(mean): 91 (21 May 2025 08:00) (91 - 118)  ABP: --  ABP(mean): --  RR: 23 (21 May 2025 08:00) (17 - 25)  SpO2: 99% (21 May 2025 08:00) (96% - 100%)    O2 Parameters below as of 21 May 2025 08:00  Patient On (Oxygen Delivery Method): room air          05-21-25 @ 10:49  T(C): 37.1 (05-21-25 @ 04:00), Max: 38.4 (05-20-25 @ 17:00)  HR: 82 (05-21-25 @ 08:00) (76 - 104)  BP: 116/77 (05-21-25 @ 08:00) (116/77 - 155/96)  RR: 23 (05-21-25 @ 08:00) (17 - 25)  SpO2: 99% (05-21-25 @ 08:00) (96% - 100%)  Wt(kg): --  CAPILLARY BLOOD GLUCOSE          HOSPITAL MEDICATIONS:  MEDICATIONS  (STANDING):  apixaban 5 milliGRAM(s) Oral two times a day  atorvastatin 80 milliGRAM(s) Oral at bedtime  dapagliflozin 10 milliGRAM(s) Oral daily  metoprolol succinate ER 25 milliGRAM(s) Oral daily  nicotine -  14 mG/24Hr(s) Patch 1 Patch Transdermal daily  piperacillin/tazobactam IVPB.. 3.375 Gram(s) IV Intermittent every 8 hours  polyethylene glycol 3350 17 Gram(s) Oral two times a day  senna 2 Tablet(s) Oral at bedtime  tamsulosin 0.4 milliGRAM(s) Oral at bedtime    MEDICATIONS  (PRN):  acetaminophen     Tablet .. 650 milliGRAM(s) Oral every 6 hours PRN Temp greater or equal to 38.5C (101.3F), Mild Pain (1 - 3)      PHYSICAL EXAMINATION:  General: NAD   Cardiology: S1/S2 with no murmur   Respiratory: CTA BL with no wheeze   GI: Soft and NTND  Extremities: KENNA of BL UE/LE   Neurology: Awake     LABS:                        13.8   5.86  )-----------( 240      ( 21 May 2025 05:47 )             40.9     05-21    138  |  104  |  13  ----------------------------<  90  4.0   |  19[L]  |  1.04    Ca    9.1      21 May 2025 05:47  Phos  3.1     05-20  Mg     2.0     05-20    TPro  6.6  /  Alb  3.7  /  TBili  0.6  /  DBili  x   /  AST  18  /  ALT  17  /  AlkPhos  80  05-20          MICROBIOLOGY:     RADIOLOGY:    CARDIOLOGY:

## 2025-05-21 NOTE — PROGRESS NOTE ADULT - SUBJECTIVE AND OBJECTIVE BOX
Neurology -Primary Vascular Progress note      Interval History: Pt developed fever 5/20, zosyn and vanc started empirically. 5/20 Patient reports 2 years ago was told by a cardiologist his "heart was not pumping right" but did not follow up after. Denies alcohol use.       Review of Systems:   All other review of systems is negative unless indicated above.    Allergies:  No Known Allergies      PMHx/PSHx/Family Hx: As above, otherwise see below   No pertinent past medical history    HTN (hypertension)            Medications:  MEDICATIONS  (STANDING):  apixaban 5 milliGRAM(s) Oral two times a day  atorvastatin 80 milliGRAM(s) Oral at bedtime  dapagliflozin 10 milliGRAM(s) Oral daily  metoprolol succinate ER 25 milliGRAM(s) Oral daily  nicotine -  14 mG/24Hr(s) Patch 1 Patch Transdermal daily  piperacillin/tazobactam IVPB.. 3.375 Gram(s) IV Intermittent every 8 hours  polyethylene glycol 3350 17 Gram(s) Oral two times a day  senna 2 Tablet(s) Oral at bedtime  tamsulosin 0.4 milliGRAM(s) Oral at bedtime  vancomycin  IVPB      vancomycin  IVPB 750 milliGRAM(s) IV Intermittent every 12 hours    MEDICATIONS  (PRN):  acetaminophen     Tablet .. 650 milliGRAM(s) Oral every 6 hours PRN Temp greater or equal to 38.5C (101.3F), Mild Pain (1 - 3)        Vital Signs Last 24 Hrs  T(C): 37.1 (21 May 2025 04:00), Max: 38.4 (20 May 2025 17:00)  T(F): 98.8 (21 May 2025 04:00), Max: 101.2 (20 May 2025 17:00)  HR: 80 (21 May 2025 06:00) (76 - 104)  BP: 130/84 (21 May 2025 06:00) (130/79 - 155/96)  BP(mean): 102 (21 May 2025 06:00) (99 - 118)  ABP: --  ABP(mean): --  RR: 20 (21 May 2025 06:00) (17 - 26)  SpO2: 100% (21 May 2025 06:00) (96% - 100%)    O2 Parameters below as of 21 May 2025 06:00  Patient On (Oxygen Delivery Method): nasal cannula  O2 Flow (L/min): 2        Physical Examination  General - NAD, sitting in chair    Neurologic Exam:  Mental status - Awake, Alert, Oriented to person, place, and time. Speech fluent, repetition and naming intact. Follows commands    Cranial nerves - VFF, EOMI, Left facial palsy, hearing intact b/l    Motor -   Strength testing   Drift LUE and LLE extremities   RUE and RLE no drift     Coordination - No tremors appreciated    Gait and station - Deferred                          13.8   5.86  )-----------( 240      ( 21 May 2025 05:47 )             40.9     05-21    138  |  104  |  13  ----------------------------<  90  4.0   |  19[L]  |  1.04    Ca    9.1      21 May 2025 05:47  Phos  3.1     05-20  Mg     2.0     05-20    TPro  6.6  /  Alb  3.7  /  TBili  0.6  /  DBili  x   /  AST  18  /  ALT  17  /  AlkPhos  80  05-20    CAPILLARY BLOOD GLUCOSE          Urinalysis Basic - ( 21 May 2025 05:47 )    Color: x / Appearance: x / SG: x / pH: x  Gluc: 90 mg/dL / Ketone: x  / Bili: x / Urobili: x   Blood: x / Protein: x / Nitrite: x   Leuk Esterase: x / RBC: x / WBC x   Sq Epi: x / Non Sq Epi: x / Bacteria: x      I&O's Summary    20 May 2025 07:01  -  21 May 2025 07:00  --------------------------------------------------------  IN: 1190 mL / OUT: 1950 mL / NET: -760 mL          Radiology:  CT Head No Cont:  (19 May 2025 11:32)  < from: CT Angio Chest PE Protocol w/ IV Cont (05.19.25 @ 18:06) >  IMPRESSION:  No pulmonary embolism.    Subtle central predominant groundglass opacities with septal thickening,   which are nonspecific but may be seen in setting of pulmonary edema.    Multiple liver lesions, likely a combination of cysts and hemangioma.   Additional indeterminate lesions in the right hepatic lobe, for further   evaluation with MRI with and without contrast may be obtained.      < end of copied text >  < from: CT Head No Cont (05.19.25 @ 11:32) >    IMPRESSION:    Hypoattenuation in the right basal ganglia and right periinsular cortex,   compatible with acute infarct. No associated hemorrhage or mass effect.    Small chronic infarct in the right frontal white matter adjacent to the   right frontal horn is reidentified.    < end of copied text >       Neurology -Primary Vascular Progress note      Interval History: Pt developed fever 5/20, zosyn and vanc started empirically, d/c'd vanc today as no indication of MRSA. 5/20 Patient reports 2 years ago was told by a cardiologist his "heart was not pumping right" but did not follow up after. Denies alcohol use.       Review of Systems:   All other review of systems is negative unless indicated above.     Allergies:  No Known Allergies      PMHx/PSHx/Family Hx: As above, otherwise see below   No pertinent past medical history    HTN (hypertension)            Medications:  MEDICATIONS  (STANDING):  apixaban 5 milliGRAM(s) Oral two times a day  atorvastatin 80 milliGRAM(s) Oral at bedtime  dapagliflozin 10 milliGRAM(s) Oral daily  metoprolol succinate ER 25 milliGRAM(s) Oral daily  nicotine -  14 mG/24Hr(s) Patch 1 Patch Transdermal daily  piperacillin/tazobactam IVPB.. 3.375 Gram(s) IV Intermittent every 8 hours  polyethylene glycol 3350 17 Gram(s) Oral two times a day  senna 2 Tablet(s) Oral at bedtime  tamsulosin 0.4 milliGRAM(s) Oral at bedtime  vancomycin  IVPB      vancomycin  IVPB 750 milliGRAM(s) IV Intermittent every 12 hours    MEDICATIONS  (PRN):  acetaminophen     Tablet .. 650 milliGRAM(s) Oral every 6 hours PRN Temp greater or equal to 38.5C (101.3F), Mild Pain (1 - 3)        Vital Signs Last 24 Hrs  T(C): 37.1 (21 May 2025 04:00), Max: 38.4 (20 May 2025 17:00)  T(F): 98.8 (21 May 2025 04:00), Max: 101.2 (20 May 2025 17:00)  HR: 80 (21 May 2025 06:00) (76 - 104)  BP: 130/84 (21 May 2025 06:00) (130/79 - 155/96)  BP(mean): 102 (21 May 2025 06:00) (99 - 118)  ABP: --  ABP(mean): --  RR: 20 (21 May 2025 06:00) (17 - 26)  SpO2: 100% (21 May 2025 06:00) (96% - 100%)    O2 Parameters below as of 21 May 2025 06:00  Patient On (Oxygen Delivery Method): nasal cannula  O2 Flow (L/min): 2        Physical Examination  General - NAD, sitting in chair    Neurologic Exam:  Mental status - Awake, Alert, Oriented to person, place, and time. Speech fluent, repetition and naming intact. Follows commands    Cranial nerves - VFF, EOMI, Left facial palsy, hearing intact b/l    Motor -   Strength testing   Drift LUE and LLE extremities   RUE and RLE no drift     Coordination - No tremors appreciated    Gait and station - Deferred                          13.8   5.86  )-----------( 240      ( 21 May 2025 05:47 )             40.9     05-21    138  |  104  |  13  ----------------------------<  90  4.0   |  19[L]  |  1.04    Ca    9.1      21 May 2025 05:47  Phos  3.1     05-20  Mg     2.0     05-20    TPro  6.6  /  Alb  3.7  /  TBili  0.6  /  DBili  x   /  AST  18  /  ALT  17  /  AlkPhos  80  05-20    CAPILLARY BLOOD GLUCOSE          Urinalysis Basic - ( 21 May 2025 05:47 )    Color: x / Appearance: x / SG: x / pH: x  Gluc: 90 mg/dL / Ketone: x  / Bili: x / Urobili: x   Blood: x / Protein: x / Nitrite: x   Leuk Esterase: x / RBC: x / WBC x   Sq Epi: x / Non Sq Epi: x / Bacteria: x      I&O's Summary    20 May 2025 07:01  -  21 May 2025 07:00  --------------------------------------------------------  IN: 1190 mL / OUT: 1950 mL / NET: -760 mL          Radiology:  CT Head No Cont:  (19 May 2025 11:32)  < from: CT Angio Chest PE Protocol w/ IV Cont (05.19.25 @ 18:06) >  IMPRESSION:  No pulmonary embolism.    Subtle central predominant groundglass opacities with septal thickening,   which are nonspecific but may be seen in setting of pulmonary edema.    Multiple liver lesions, likely a combination of cysts and hemangioma.   Additional indeterminate lesions in the right hepatic lobe, for further   evaluation with MRI with and without contrast may be obtained.      < end of copied text >  < from: CT Head No Cont (05.19.25 @ 11:32) >    IMPRESSION:    Hypoattenuation in the right basal ganglia and right periinsular cortex,   compatible with acute infarct. No associated hemorrhage or mass effect.    Small chronic infarct in the right frontal white matter adjacent to the   right frontal horn is reidentified.    < end of copied text >       THE PATIENT WAS SEEN AND EXAMINED BY ME WITH THE HOUSESTAFF AND STROKE TEAM DURING MORNING ROUNDS.   HPI:  HPI: Patient TIFFANY SANCHEZ is a 54y (1970) RH man with a PMHx significant for HTN presenting as code stroke for Left sided weakness. Patient was driving, pulled over  due to chest pain and SOB. Neighbor called EMS after finding him on the floor with slurred speech and L sided weakness.Spoke with Wife Daiana, who saw him this morning at his baseline. He was complaining of SOB. Denies being on AC/AP. No hx stroke. Does not take any medications. At baseline, o x3, ambulates without assistance, independent of all Adls.      SUBJECTIVE: febrile overnight to 101.2 F.       acetaminophen     Tablet .. 650 milliGRAM(s) Oral every 6 hours PRN  apixaban 5 milliGRAM(s) Oral two times a day  atorvastatin 80 milliGRAM(s) Oral at bedtime  dapagliflozin 10 milliGRAM(s) Oral daily  lactobacillus acidophilus 1 Tablet(s) Oral daily  metoprolol succinate ER 25 milliGRAM(s) Oral daily  nicotine -  14 mG/24Hr(s) Patch 1 Patch Transdermal daily  piperacillin/tazobactam IVPB.. 3.375 Gram(s) IV Intermittent every 8 hours  polyethylene glycol 3350 17 Gram(s) Oral two times a day  senna 2 Tablet(s) Oral at bedtime  tamsulosin 0.4 milliGRAM(s) Oral at bedtime      PHYSICAL EXAM:   Vital Signs Last 24 Hrs  T(C): 37 (21 May 2025 10:42), Max: 38.4 (20 May 2025 17:00)  T(F): 98.6 (21 May 2025 10:42), Max: 101.2 (20 May 2025 17:00)  HR: 93 (21 May 2025 10:42) (76 - 104)  BP: 132/95 (21 May 2025 10:42) (116/77 - 155/96)  BP(mean): 109 (21 May 2025 10:42) (91 - 118)  RR: 25 (21 May 2025 10:42) (19 - 25)  SpO2: 98% (21 May 2025 10:42) (96% - 100%)    Parameters below as of 21 May 2025 10:42  Patient On (Oxygen Delivery Method): room air        General: No acute distress  HEENT: EOM intact, visual fields full  Abdomen: Soft, nontender, nondistended   Extremities: No edema    NEUROLOGICAL EXAM:  Mental status: Awake, alert, oriented x3, sitting in chair, no aphasia, no neglect, normal memory   Cranial Nerves: left facial palsy, no nystagmus, no dysarthria,  tongue midline  Motor exam: Normal tone, Drift LUE and LLE extremities, moves right side spontaneously  Sensation: Intact to light touch   Coordination/ Gait: No dysmetria, MADELEINE intact and symmetric bilaterally    LABS:                        13.8   5.86  )-----------( 240      ( 21 May 2025 05:47 )             40.9    05-21    138  |  104  |  13  ----------------------------<  90  4.0   |  19[L]  |  1.04    Ca    9.1      21 May 2025 05:47  Phos  3.1     05-20  Mg     2.0     05-20    TPro  6.6  /  Alb  3.7  /  TBili  0.6  /  DBili  x   /  AST  18  /  ALT  17  /  AlkPhos  80  05-20        IMAGING: Reviewed by me.      CT Angio Chest PE Protocol w/ IV Cont (05.19.25 @ 18:06)   IMPRESSION:  No pulmonary embolism.    Subtle central predominant groundglass opacities with septal thickening,   which are nonspecific but may be seen in setting of pulmonary edema.    Multiple liver lesions, likely a combination of cysts and hemangioma.   Additional indeterminate lesions in the right hepatic lobe, for further   evaluation with MRI with and without contrast may be obtained.       CT Head No Cont (05.19.25 @ 11:32)    IMPRESSION:    Hypoattenuation in the right basal ganglia and right periinsular cortex,   compatible with acute infarct. No associated hemorrhage or mass effect.    Small chronic infarct in the right frontal white matter adjacent to the   right frontal horn is reidentified.         THE PATIENT WAS SEEN AND EXAMINED BY ME WITH THE HOUSESTAFF AND STROKE TEAM DURING MORNING ROUNDS.   HPI:   Patient TIFFANY SANCHEZ is a 54y (1970) RH man with a PMHx significant for HTN presenting as code stroke for Left sided weakness. Patient was driving, pulled over  due to chest pain and SOB. Neighbor called EMS after finding him on the floor with slurred speech and L sided weakness. Spoke with Wife Daiana, who saw him this morning at his baseline. He was complaining of SOB. Denies being on AC/AP. No hx stroke. Does not take any medications. At baseline, o x3, ambulates without assistance, independent of all Adls.      SUBJECTIVE: febrile overnight to 101.2 F.       acetaminophen     Tablet .. 650 milliGRAM(s) Oral every 6 hours PRN  apixaban 5 milliGRAM(s) Oral two times a day  atorvastatin 80 milliGRAM(s) Oral at bedtime  dapagliflozin 10 milliGRAM(s) Oral daily  lactobacillus acidophilus 1 Tablet(s) Oral daily  metoprolol succinate ER 25 milliGRAM(s) Oral daily  nicotine -  14 mG/24Hr(s) Patch 1 Patch Transdermal daily  piperacillin/tazobactam IVPB.. 3.375 Gram(s) IV Intermittent every 8 hours  polyethylene glycol 3350 17 Gram(s) Oral two times a day  senna 2 Tablet(s) Oral at bedtime  tamsulosin 0.4 milliGRAM(s) Oral at bedtime      PHYSICAL EXAM:   Vital Signs Last 24 Hrs  T(C): 37 (21 May 2025 10:42), Max: 38.4 (20 May 2025 17:00)  T(F): 98.6 (21 May 2025 10:42), Max: 101.2 (20 May 2025 17:00)  HR: 93 (21 May 2025 10:42) (76 - 104)  BP: 132/95 (21 May 2025 10:42) (116/77 - 155/96)  BP(mean): 109 (21 May 2025 10:42) (91 - 118)  RR: 25 (21 May 2025 10:42) (19 - 25)  SpO2: 98% (21 May 2025 10:42) (96% - 100%)    Parameters below as of 21 May 2025 10:42  Patient On (Oxygen Delivery Method): room air        General: No acute distress  HEENT: EOM intact, visual fields full  Abdomen: Soft, nontender, nondistended   Extremities: No edema    NEUROLOGICAL EXAM:  Mental status: Awake, alert, oriented x3, sitting in chair, no aphasia, no neglect, normal memory   Cranial Nerves: left facial palsy, no nystagmus, no dysarthria,  tongue midline  Motor exam: Normal tone, Drift LUE and LLE extremities, moves right side spontaneously  Sensation: Intact to light touch   Coordination/ Gait: No dysmetria, MADELEINE intact and symmetric bilaterally    LABS:                        13.8   5.86  )-----------( 240      ( 21 May 2025 05:47 )             40.9    05-21    138  |  104  |  13  ----------------------------<  90  4.0   |  19[L]  |  1.04    Ca    9.1      21 May 2025 05:47  Phos  3.1     05-20  Mg     2.0     05-20    TPro  6.6  /  Alb  3.7  /  TBili  0.6  /  DBili  x   /  AST  18  /  ALT  17  /  AlkPhos  80  05-20        IMAGING: Reviewed by me.      5/18 CT Brain stroke Protocol    No acute intracranial hemorrhage, mass effect, or midline shift.    Age-indeterminate infarct in the right frontal white matter.    Please see subsequent CTA regarding right MCA occlusion.    Findings were discussed with Dr. Gerry Miller  5/18/2025 10:55 AM by Dr. Purcell with read back confirmation.    5/18 CT Angio Brain Stroke Protocol    CT angiogram neck:  -No vaso-occlusive disease.    CT angiogram head:  -Right MCA M1 occlusion. Relative paucity of flow related enhancement in   the distal right MCA branches. Findings were discussed with Dr. Joe Miller 5/18/2025 10:55 AM by Dr. Purcell with read back confirmation.    CT perfusion:  -144 mL at risk ischemic tissue involving the right MCA territory   diffusely.  -0 mL core infarct identified.  -Mismatch ratio: Infinite.  -Mismatch volume: 144 mL.    5/18 Chest xray  Endotracheal tube with tip overlying the upper to mid trachea.  No focal consolidation.    5/18 Venous Duplex Lower extremities    Acute DVT left soleal vein. No evidence for right lower extremity DVT.    5/18 IR Neuro report     Right M1 occlusion status post TICI 3 recanalization    5/19 CT CAP with IV contrast    No pulmonary embolism.    Subtle central predominant groundglass opacities with septal thickening,   which are nonspecific but may be seen in setting of pulmonary edema.    Multiple liver lesions, likely a combination of cysts and hemangioma.   Additional indeterminate lesions in the right hepatic lobe, for further   evaluation with MRI with and without contrast may be obtained.    5/19 CT Angio Chest PE Protocol w/ IV Cont    IMPRESSION:  No pulmonary embolism.    Subtle central predominant groundglass opacities with septal thickening,   which are nonspecific but may be seen in setting of pulmonary edema.    Multiple liver lesions, likely a combination of cysts and hemangioma.   Additional indeterminate lesions in the right hepatic lobe, for further   evaluation with MRI with and without contrast may be obtained.       5/19 CT Head No Cont    IMPRESSION:    Hypoattenuation in the right basal ganglia and right periinsular cortex,   compatible with acute infarct. No associated hemorrhage or mass effect.    Small chronic infarct in the right frontal white matter adjacent to the   right frontal horn is reidentified.    5/19 TTE     1. Left ventricular cavity is mildly dilated. Left ventricular systolic function is severely decreased with an ejection fraction of 15 % by Greene's method of disks. Global left ventricular hypokinesis.   2. There is severe (grade 3) left ventricular diastolic dysfunction, with elevated left ventricular filling pressure.   3. There is increased LV mass and eccentric hypertrophy.   4. Normal right ventricular cavity size, with normal wall thickness, and reduced right ventricular systolic function.   5. Mild to moderate mitral regurgitation. Mechanism of mitral regurgitation: The mechanism of mitral regurgitation is secondary due to left venrticular dysfunction.   6. No pericardial effusion seen.   7. Agitated saline injection was negative for intracardiac shunt.   8. Pulmonary artery systolic pressure could not be estimated.   9. Findings were discussed with LUIS FERNANDO Pleitez on 5/19/2025.    5/20 MRI Head No contrast  1.  Acute infarcts in the right basal ganglia/corona radiata and high   right paramedian frontal lobe.  2.  Questionable punctate acute infarct in the left cerebellar hemisphere.  3.  Chronic ischemic changes as discussed above.

## 2025-05-21 NOTE — PROGRESS NOTE ADULT - ASSESSMENT
54y (1970) RH man with a PMHx significant for HTN presenting as code stroke for Left sided weakness. On exam, left arm and leg drift, left facial palsy. Spoke with wife bedside. CT CAP reviewed, will consult medicine. Cardiology consult for low EF. 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     PULMONARY: CXR clear, protecting airway, saturating well. CT CAP shows possible pulmonary edema. No signs of respiratory distress and patient is saturating well above 95% on pulse ox. consulted pulmonary team    CARDIOVASCULAR:  TTE shows EF 15%, cardiology consult. No need for JACEK. MR brain stable, started eliquis 5mg bid                       SBP goal: <180/105    GASTROINTESTINAL: passed dysphagia screen , CT AP shows Multiple liver lesions, medicine consulted. GI consulted, recommended MR brain w/w/o (ordered)     Diet: reg     RENAL: BUN/Cr within normal limits, good urine output      Na Goal: Greater than 135     Pizano: No    HEMATOLOGY: H/H without change, Platelets normal. L soleal DVT, appreciate Vascular recommendations. Repeat LE dopplers in 3-5 days.    Hypercoagulable panel sent      DVT ppx: eliquis 5mg BID    ID: afebrile, no leukocytosis     OTHER: Plan discussed with patient and family at bedside, all questions and concerns addressed. Smoking cessation education provided. Provided patient nicotine patch, and patient accepted.    DISPOSITION: Rehab or home depending on PT eval once stable and workup is complete    Case & Plan discussed with patient and family. All questions answered.    CORE MEASURES:         Admission NIHSS: 17     Tenecteplase: [x] YES [] NO     Statin Therapy: [x] YES [] NO     Smoking [x] YES [] NO     Afib [] YES [x] NO     Stroke Education [x] YES [] NO    Dysphagia screen : [x] Passed [] Failed- S&S pending [] Pending    DVT ppx: pt on eliquis         54y (1970) RH man with a PMHx significant for HTN presenting as code stroke for Left sided weakness. On exam, left arm and leg drift, left facial palsy. Spoke with wife bedside. CT CAP reviewed, will consult medicine. Cardiology consult for low EF. 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     PULMONARY: CXR clear, protecting airway, saturating well. CT CAP shows possible pulmonary edema, pulmonology consulted. No signs of respiratory distress and patient is saturating well above 95% on pulse ox    CARDIOVASCULAR:  TTE shows EF 15%, pt needs cardiac cath per cardiology, pt started on metoprolol and farxiga. No need for JACEK. MR brain stable, started eliquis 5mg bid                       SBP goal: <180/105    GASTROINTESTINAL: passed dysphagia screen , CT AP shows Multiple liver lesions, medicine consulted. GI consulted, recommended MR brain w/w/o (results pending)     Diet: reg     RENAL: BUN/Cr within normal limits, good urine output      Na Goal: Greater than 135     Pizano: No    HEMATOLOGY: H/H without change, Platelets normal. L soleal DVT, appreciate Vascular recommendations. Repeat LE dopplers in 3-5 days.    Hypercoagulable panel sent      DVT ppx: eliquis 5mg BID    ID: febrile 5/20 evening, infectious workup sent. Empiric abx started, no leukocytosis     OTHER: Plan discussed with patient and family at bedside, all questions and concerns addressed. Smoking cessation education provided. Provided patient nicotine patch, and patient accepted.    DISPOSITION: Rehab or home depending on PT eval once stable and workup is complete    Case & Plan discussed with patient and family. All questions answered.    CORE MEASURES:         Admission NIHSS: 17     Tenecteplase: [x] YES [] NO     Statin Therapy: [x] YES [] NO     Smoking [x] YES [] NO     Afib [] YES [x] NO     Stroke Education [x] YES [] NO    Dysphagia screen : [x] Passed [] Failed- S&S pending [] Pending    DVT ppx: pt on eliquis         54y (1970) RH man with a PMHx significant for HTN presenting as code stroke for Left sided weakness. On exam, left arm and leg drift, left facial palsy. Spoke with wife bedside. CT CAP reviewed, will consult medicine. Cardiology consult for low EF. Will need to be on AC, first will get MRI B     Impression: Significant improvement Left hemiplegia, LHH, mild dysfluency with severe dysarthria, right gaze preference due to acute ischemic stroke, R M1 occlusion s/p tenecteplase and thrombectomy TICI 3 Mechanism embolic due to low EF    NEURO: Continue close monitoring for neurologic deterioration, MRI Brain w/o result above, stable, no hemorrhage  HgbA1C 5.6, LDL 96    ANTITHROMBOTIC THERAPY: asa 81 mg daily stopped, started apixaban 5mg BID for low EF.     PULMONARY: CXR clear, protecting airway, saturating well. CT CAP shows possible pulmonary edema, pulmonology consulted. No signs of respiratory distress and patient is saturating well above 95% on pulse ox    CARDIOVASCULAR:  TTE shows EF 15%, pt needs cardiac cath per cardiology, pt started on metoprolol and farxiga. No need for JACEK. MR brain stable, started eliquis 5mg bid                       SBP goal: <180/105    GASTROINTESTINAL: passed dysphagia screen , CT AP shows Multiple liver lesions, medicine consulted. GI consulted, recommended MR brain w/w/o (results pending)     Diet: reg     RENAL: BUN/Cr within normal limits, good urine output      Na Goal: Greater than 135     Pizano: No    HEMATOLOGY: H/H without change, Platelets normal. L soleal DVT, appreciate Vascular recommendations. Repeat LE dopplers in 3-5 days.    Hypercoagulable panel sent      DVT ppx: eliquis 5mg BID    ID: febrile 5/20 evening, infectious workup sent. Empiric abx started, no leukocytosis     OTHER: Plan discussed with patient and family at bedside, all questions and concerns addressed. Smoking cessation education provided. Provided patient nicotine patch, and patient accepted.    DISPOSITION: Rehab or home depending on PT eval once stable and workup is complete    Case & Plan discussed with patient and family. All questions answered.    CORE MEASURES:         Admission NIHSS: 17     Tenecteplase: [x] YES [] NO     Statin Therapy: [x] YES [] NO     Smoking [x] YES [] NO     Afib [] YES [x] NO     Stroke Education [x] YES [] NO    Dysphagia screen : [x] Passed [] Failed- S&S pending [] Pending    DVT ppx: pt on eliquis

## 2025-05-21 NOTE — CONSULT NOTE ADULT - CONSULT REASON
medical management
DVT
c/f pulmonary edema
Rehabilitation consult
newly reduced EF (15%0  abnormal EKG
cardiac management

## 2025-05-21 NOTE — CONSULT NOTE ADULT - PROBLEM SELECTOR RECOMMENDATION 3
~pack a day/40 yrs  smoking cessation
-Febrile to tmax 101.2 F 5/20  -Afebrile this AM  -CTA chest with no PNA  -F/u BC  -Empiric ABX per primary team.

## 2025-05-21 NOTE — CONSULT NOTE ADULT - CONSULT REQUESTED DATE/TIME
20-May-2025 11:25
19-May-2025 09:40
21-May-2025 12:57
21-May-2025 17:04
20-May-2025 10:57
21-May-2025 09:20

## 2025-05-21 NOTE — SWALLOW BEDSIDE ASSESSMENT ADULT - SWALLOW EVAL: DIAGNOSIS
Patient presents with overtly functional oropharyngeal swallow skills characterized by adequate mastication of solids, timely oral transit, complete oral clearance post swallow, and no overt s/s of laryngeal penetration/aspiration across trials administered. Unable to r/o silent aspiration at bedside.

## 2025-05-21 NOTE — CONSULT NOTE ADULT - ASSESSMENT
· Assessment	  54 year old right hand dominant male with a PMHx significant for HTN presenting as code stroke with left sided weakness. Patient was driving, pulled over due to chest pain and SOB. Neighbor called EMS after finding him on the floor with slurred speech and left sided weakness. The team spoke with patient's wife Daiana, who saw him this morning at his baseline. He was complaining of SOB. Patient denies being on AC/AP. Patient is POD#2 status post catheter cerebral angiogram and mechanical thrombectomy.        Problem/Plan - 1:  ·  Problem: CVA (cerebrovascular accident).   ·  Plan: R M1 occlusion s/p tenecteplase and thrombectomy TICI 3 Mechanism embolic due to low EF  TTE: EF 15%  Started on Eliquis   Outpt cardiac workup to start GDMT   MRI done.     Problem/Plan - 2:  ·  Problem: HTN (hypertension).   ·  Plan: Goal <180  stable  cont meds.     Problem/Plan - 3:  ·  Problem: Current smoker.   ·  Plan: ~pack a day/40 yrs  smoking cessation.

## 2025-05-21 NOTE — PROGRESS NOTE ADULT - ATTENDING COMMENTS
agree with above  plan to transfer to stroke unit today   f/u repeat brain imaging  to decide on timing of AC   statin therpay   etiology related to low EF   spoke with family in ICU   Enrique Blanton MD  Vascular Neurology  Office: [phone]
54-year-old right-handed man with stroke risk factors of HTN, HLD and tobacco use presented with right M1 occlusion with NIHSS 17 on admission for which he received IV TNK on 5/18/25 at 0905 and underwent right M1 thrombectomy with TICI 3 recannulization.  Of note, he was found to have a left soleal DVT.     Awake, alert, passed dysphagia and tolerating PO, mild left facial droop, mild left arm drift with 4+/5 strength, left leg 4/5    Complete stroke work-up; expand to include hypercoagulable work-up given LE DVT  24hr post-TNK CT without heme, so start CTE prophylaxis and ASA  Transfer to Stroke Service - accepted
seen in stroke unit with team on rounds  critically ill in stroke unit   febrile overnight 1x  infectious wokrup  COVID/flu neg   f/u cardio possible [facility]  Enrique Blanton MD  Vascular Neurology  Office: [phone]

## 2025-05-21 NOTE — PROGRESS NOTE ADULT - ASSESSMENT
55 YO RH M with PMHx of current smoker and HTN presented with left hemiparesis and slurred speech and found with R M1 occlusion with acute CVA and s/p TNK and thrombectomy. Admitted to neuro and internal medicine consulted for further management.     # L hemiparesis and slurred speech second to acute right CVA with R M1 occlusion   - CTH with right basal ganglia and right insular cortex acute infarct and chronic small infarct in right frontal area.   - CTA HEAD with right MCA M1 occlusion.   - CTA NECK with no disease   - PanCT with no PE   - MRI BRAIN with aute infarcts in the right basal ganglia/corona radiata and high right paramedian frontal lobe. Questionable punctate acute infarct in the left cerebellar hemisphere.  - s/p TNK 5/18   - s/p neuro IR TICI 3 recanalization 5/18   - Passed dysphagia screen   - TTE with no PFO, but noted with new HFrEF as below  - Case discussed with neuro and given new HFrEF concern for possible embolic source   - Continue on lipitor and eliquis   - Given fever and GGO on CT recc official SS consult   - Pending hypercoagulable work up   - Pending PT  - Fall, seizure and aspiration precautions   - Neuro checks per floor protocol   - Care per neruo     # SOB with pulm edema likely second to ADHF vs aspiration?   - CTA CHEST with no PE, however noted with pulmonary edema   - Currently on RA   - Monitor respiratory status  - Monitor volume status   - Supplemental O2 to keep SPO2 > 92    # SEVERE HFrEF 15 and biventricular dysfunction   - TTE with EF 15 with global hypokinesis, severe grade 3 LV ddfxn, LBH, mild to moderate MR, reduced RVSF.   - Continue on toprol 25 and farxiga  - Monitor volume status   - Monitor IO   - Keep O>I  - Cards rec outpt cardiac ischemic workup  - Cards appreciated     # LLE soleal DVT   - DOPPLER with acute LLE soleal DVT  - Continue on eliqius  - Pending RPT DOPPLER in 3-5 days    # Fever from aspiration vs DVT vs UTI?   - Fever of 101.2 on 5/20   - CTA CHEST with no PE, however noted with GGO more likely edema  - UA with trace leuks and pyuria, but negative bacteria   - MiniRVP negative   - MRSA PCR negative  - Continue on zosyn for now   - Pending BCx and recommend FULL RVP, UCx and official SS eval.     # NAGMA   - HCO3 dropping  - Previous gas on admission with mild metabolic acidosis   - Monitor acidosis     # Liver lesions  - CT AP with multiple liver lesions, likely a combination of cysts and hemangioma. Additional indeterminate lesions in the right hepatic lobe.   - LFTs normal   - Outpatient GI follow up     # Current smoker   - 1 PPD x 40 years   - Smoking cessation counselling   - Nicotine patch     # BPH   - Continue on flomax   - Monitor UOP     # DVT PPX with DOAC    Case discussed with attending  53 YO RH M with PMHx of current smoker and HTN presented with left hemiparesis and slurred speech and found with R M1 occlusion with acute CVA and s/p TNK and thrombectomy. Admitted to neuro and internal medicine consulted for further management.     # L hemiparesis and slurred speech second to acute right CVA with R M1 occlusion   - CTH with right basal ganglia and right insular cortex acute infarct and chronic small infarct in right frontal area.   - CTA HEAD with right MCA M1 occlusion.   - CTA NECK with no disease   - PanCT with no PE   - MRI BRAIN with aute infarcts in the right basal ganglia/corona radiata and high right paramedian frontal lobe. Questionable punctate acute infarct in the left cerebellar hemisphere.  - s/p TNK 5/18   - s/p neuro IR TICI 3 recanalization 5/18   - Passed dysphagia screen   - TTE with no PFO, but noted with new HFrEF as below  - SS with no dysphagia  - Case discussed with neuro and given new HFrEF concern for possible embolic source   - Continue on lipitor and eliquis   - Pending hypercoagulable work up   - Fall, seizure and aspiration precautions   - Neuro checks per floor protocol   - Care per neruo     # SOB with pulm edema likely second to ADHF vs aspiration?   - CTA CHEST with no PE, however noted with pulmonary edema   - Currently on RA   - Monitor respiratory status  - Monitor volume status   - Supplemental O2 to keep SPO2 > 92    # SEVERE HFrEF 15 and biventricular dysfunction   - TTE with EF 15 with global hypokinesis, severe grade 3 LV ddfxn, LBH, mild to moderate MR, reduced RVSF.   - Continue on toprol 25 and farxiga  - Monitor volume status   - Monitor IO   - Keep O>I  - Cards rec outpt cardiac ischemic workup  - Cards appreciated     # LLE soleal DVT   - DOPPLER with acute LLE soleal DVT  - Continue on eliqius  - Pending RPT DOPPLER in 3-5 days    # Fever from aspiration vs DVT vs UTI?   - Fever of 101.2 on 5/20   - CTA CHEST with no PE, however noted with GGO more likely edema  - UA with trace leuks and pyuria, but negative bacteria   - MiniRVP negative   - MRSA PCR negative  - Given CVA with GGO on CT recc SS, however no dysphagia   - Fevers could be second to DVT ?   - Continue on zosyn for 5-7 day course  - Pending BCx and recommend FULL RVP and UCx    # NAGMA   - HCO3 dropping  - Previous gas on admission with mild metabolic acidosis   - Monitor acidosis     # Liver lesions  - CT AP with multiple liver lesions, likely a combination of cysts and hemangioma. Additional indeterminate lesions in the right hepatic lobe.   - MRI with normal liver morphology. No significant steatosis. A 1.6 cm segment 6 hemangioma and scattered subcentimeter cysts. No suspicious enhancing   lesions. 2 of the queried lesions on recent CT are not reproducible on MRI.  - LFTs normal   - Outpatient GI follow up     # Current smoker   - 1 PPD x 40 years   - Smoking cessation counselling   - Nicotine patch     # BPH   - Continue on flomax   - Monitor UOP     # DVT PPX with DOAC    Case discussed with attending

## 2025-05-21 NOTE — SWALLOW BEDSIDE ASSESSMENT ADULT - SWALLOW EVAL: PATIENT/FAMILY GOALS STATEMENT
Pt denied difficulty swallowing or coughing/choking/throat clearing with PO intake. Son at bedside and endorsed same.

## 2025-05-21 NOTE — CONSULT NOTE ADULT - PROBLEM SELECTOR RECOMMENDATION 9
R M1 occlusion s/p tenecteplase and thrombectomy TICI 3 Mechanism embolic due to low EF  TTE: EF 15%  Pt will need to be started on AC and GDMT  Outpt cardiac workup   Check MRI
-Now resolved  -May be 2nd to mild congestion on CTA. Negative for PE  -TTE with decreased LVEF of 15% on echo w/ global LV hypokinesis, severe LV diastolic dysfunction, mild-mod MR  -Keep O>I as tolerated  -Duoneb q6h PRN  -Keep sats >90%.

## 2025-05-21 NOTE — CONSULT NOTE ADULT - SUBJECTIVE AND OBJECTIVE BOX
[facility]                                                                                                     INTERVENTIONAL CARDIOLOGY CONSULTATION NOTE                                                                                             History obtained by: Patient and medical record  Community Cardiologist: Rashad  Reason for Consultation: Evaluation for cardiac catheterization  Available pt records reviewed: Yes [ x ] No [  ]    [facility]ARD[facility]-Apurva Hadley, Lisa, Viktoriya, Rey, Guillaume, Jose, Chapincito Knowles Johnston-Cox, Kendrick, Surjit:  [facility] ACP team  [phone]      Interventional Cardiology Consult  Chief complaint:  left sided weakness  Patient is a 54y old  Male who presents with a chief complaint of Left m1 (21 May 2025 12:57)      HPI:  HPI: Patient TIFFANY SANCHEZ is a 54y (1970) RH man with a PMHx significant for HTN presenting as code stroke for Left sided weakness. Patient was driving, pulled over  due to chest pain and SOB. Neighbor called EMS after finding him on the floor with slurred speech and L sided weakness.Spoke with Wife Daiana, who saw him this morning at his baseline. He was complaining of SOB. Denies being on AC/AP. No hx stroke. Does not take any medications. At baseline, o x3, ambulates without assistance, independent of all Adls.     (18 May 2025 11:27)    Anginal Class:        Angina (Class):        Ischemic Symptoms:     Heart Failure:        Systolic/Diastolic/Combined: Systolic       NYHA Class (within 2 weeks):     PAST MEDICAL HISTORY  No pertinent past medical history    HTN (hypertension)    Associated Risk Factors:        Frailty Assessment: (none/mild/mod/severe):       Cerebrovascular Disease: Y       Chronic Lung Disease: N       Peripheral Arterial Disease: N/A       Chronic Kidney Disease (if yes, what is GFR): N/A       Uncontrolled Diabetes (if yes, what is HgbA1C or FBS): N/A       Poorly Controlled Hypertension (if yes, what is SBP): Y       Morbid Obesity (if yes, what is BMI): N/A       History of Recent Ventricular Arrhythmia: N/A       Inability to Ambulate Safely: seen by PT recommend rehab       Need for Therapeutic Anticoagulation: N/A       Antiplatelet or Contrast Allergy: No      PAST SURGICAL HISTORY  No significant past surgical history    SOCIAL HISTORY:  ***    FAMILY HISTORY:  No pertinent family history in first degree relatives    Family History of Premature Cardiovascular Disease:  Yes [  ] No [  ]    HOME MEDICATIONS:  Cipro 500 mg oral tablet: 1 tab(s) orally every 12 hours (07 Oct 2020 10:09)  Flomax 0.4 mg oral capsule: 1 cap(s) orally once a day (07 Oct 2020 10:09)    CURRENT CARDIAC MEDICATIONS:  metoprolol succinate ER 25 milliGRAM(s) Oral daily    Antianginal Therapies:        Beta Blockers:  Metoprolol       Calcium Channel Blockers:        Long Acting Nitrates:        Ranexa:     ALLERGIES:   No Known Allergies      REVIEW OF SYMPTOMS:   CONSTITUTIONAL: o fever, no chills, no weight loss, no weight gain, no fatigue   CARDIOVASCULAR: ST  RESPIRATORY: no Shortness of breath, no cough, no wheezing  : No dysuria, no hematuria   GI: No dark color stool, no nausea, no diarrhea, no constipation, no abdominal pain   NEURO: No headache, no slurred speech   ALL OTHER REVIEW OF SYSTEMS ARE NEGATIVE.    VITAL SIGNS:  T(C): 37.1 (25 @ 16:00), Max: 37.4 (25 @ 20:00)  T(F): 98.8 (25 @ 16:00), Max: 99.3 (25 @ 20:00)  HR: 85 (25 @ 16:00) (76 - 114)  BP: 140/95 (25 @ 16:00) (116/77 - 150/91)  RR: 22 (25 @ 16:00) (13 - 25)  SpO2: 98% (25 @ 16:00) (96% - 100%)    INTAKE AND OUTPUT:      @ :  -   @ 07:00  --------------------------------------------------------  IN: 1190 mL / OUT: 1950 mL / NET: -760 mL     @ 07:01  -   @ 17:05  --------------------------------------------------------  IN: 480 mL / OUT: 0 mL / NET: 480 mL      PHYSICAL EXAM:  Constitutional: Comfortable . No acute distress.   HEENT: Atraumatic and normocephalic , neck is supple . no JVD. No carotid bruit.  CNS: A&Ox3. Mild weakness Left side without dysphagia   Respiratory: CTAB, unlabored   Cardiovascular: RRR normal s1 s2. No murmur. No rubs or gallop.  Gastrointestinal: Soft, non-tender. +Bowel sounds.   Extremities: 2+ Peripheral Pulses, No clubbing, cyanosis, or edema  Psychiatric: Calm . no agitation.   Skin: Warm and dry, no ulcers on extremities     LABS:                13.8   5.86  )-----------( 240      ( 21 May 2025 05:47 )             40.9     05-    138  |  104  |  13  ----------------------------<  90  4.0   |  19[L]  |  1.04    Ca    9.1      21 May 2025 05:47  Phos  3.1     05-20  Mg     2.0     -20    TPro  6.6  /  Alb  3.7  /  TBili  0.6  /  DBili  x   /  AST  18  /  ALT  17  /  AlkPhos  80  05-20      Urinalysis Basic - ( 21 May 2025 05:47 )    Color: x / Appearance: x / SG: x / pH: x  Gluc: 90 mg/dL / Ketone: x  / Bili: x / Urobili: x   Blood: x / Protein: x / Nitrite: x   Leuk Esterase: x / RBC: x / WBC x   Sq Epi: x / Non Sq Epi: x / Bacteria: x      EC25 , LAE, marked ST/T wave abnormality anterolateral leads      PREVIOUS DIAGNOSTIC CARDIAC TESTING   ECHO:  25   1. Left ventricular cavity is mildly dilated. Left ventricular systolic function is severely decreased with an ejection fraction of 15 % by Greene's method of disks. Global left ventricular hypokinesis.   2. There is severe (grade 3) left ventricular diastolic dysfunction, with elevated left ventricular filling pressure.   3. There is increased LV mass and eccentric hypertrophy.   4. Normal right ventricular cavity size, with normal wall thickness, and reduced right ventricular systolic function.   5. Mild to moderate mitral regurgitation. Mechanism of mitral regurgitation: The mechanism of mitral regurgitation is secondary due to left venrticular dysfunction.   6. No pericardial effusion seen.   7. Agitated saline injection was negative for intracardiac shunt.   8. Pulmonary artery systolic pressure could not be estimated.    STRESS:    Cardiac Interventions:    Risk Stratification:  ASA: 2  Mallampati: 2  Creatinine: 1.04  GFR: 84    Plan/Recommendations:   - POD#2 status post catheter cerebral angiogram and mechanical thrombectomy.   -plan for LHC 2025  - HOLD ELIQUIS FOR CATH 2025  -patient seen and examined  - Pt. suitable for sedation: yes  - Sedation plan: Moderate  -ECG and Labs reviewed  -NS 250mL IV bolus pre-cath- no secondary to EF 15%  -Risks and benefits of procedure, sedation and alternative therapy have been explained to the patient including but not limited to:  allergic reaction, bleeding with possible need fo rblood transfusion, infection, renal and vascular compromise, limb damage, arrhythmia, myocardial infarction, CVA risk, need for emergent CABG/vascular surgery, and risk of death. All questions answered with appropriate patient teachback.   -Informed consent obtained by attending IC

## 2025-05-21 NOTE — PROGRESS NOTE ADULT - SUBJECTIVE AND OBJECTIVE BOX
Subjective: Patient seen and examined. No new events except as noted.   Moved to stroke unit  Tachy overnight, HR now better controlled  fever overnight- zosyn started.     REVIEW OF SYSTEMS:    CONSTITUTIONAL: No weakness, fevers or chills  EYES/ENT: No visual changes;  No vertigo or throat pain   NECK: No pain or stiffness  RESPIRATORY: No cough, wheezing, hemoptysis; No shortness of breath  CARDIOVASCULAR: No chest pain or palpitations  GASTROINTESTINAL: No abdominal or epigastric pain. No nausea, vomiting, or hematemesis; No diarrhea or constipation. No melena or hematochezia.  GENITOURINARY: No dysuria, frequency or hematuria  NEUROLOGICAL: No numbness or weakness  SKIN: No itching, burning, rashes, or lesions   All other review of systems is negative unless indicated above.    MEDICATIONS:  MEDICATIONS  (STANDING):  apixaban 5 milliGRAM(s) Oral two times a day  atorvastatin 80 milliGRAM(s) Oral at bedtime  dapagliflozin 10 milliGRAM(s) Oral daily  metoprolol succinate ER 25 milliGRAM(s) Oral daily  nicotine -  14 mG/24Hr(s) Patch 1 Patch Transdermal daily  piperacillin/tazobactam IVPB.. 3.375 Gram(s) IV Intermittent every 8 hours  polyethylene glycol 3350 17 Gram(s) Oral two times a day  senna 2 Tablet(s) Oral at bedtime  tamsulosin 0.4 milliGRAM(s) Oral at bedtime      PHYSICAL EXAM:  T(C): 37.1 (05-21-25 @ 04:00), Max: 38.4 (05-20-25 @ 17:00)  HR: 82 (05-21-25 @ 08:00) (76 - 104)  BP: 116/77 (05-21-25 @ 08:00) (116/77 - 155/96)  RR: 23 (05-21-25 @ 08:00) (17 - 26)  SpO2: 99% (05-21-25 @ 08:00) (96% - 100%)  Wt(kg): --  I&O's Summary    20 May 2025 07:01  -  21 May 2025 07:00  --------------------------------------------------------  IN: 1190 mL / OUT: 1950 mL / NET: -760 mL          Appearance: Normal	  HEENT:   Normal oral mucosa, PERRL, EOMI	  Lymphatic: No lymphadenopathy  Cardiovascular: Normal S1 S2, No JVD, No murmurs, No edema  Respiratory: Lungs clear to auscultation	  Psychiatry: A & O x 3, Mood & affect appropriate  Gastrointestinal:  Soft, Non-tender, + BS	  Skin: No rashes, No ecchymoses, No cyanosis	  Neurologic Exam:  Mental status - Awake, Alert, Oriented to person, place, and time. Speech fluent, repetition and naming intact. Follows commands    Cranial nerves - VFF, EOMI, Left facial palsy, hearing intact b/l    Motor -   Strength testing   Drift LUE and LLE extremities   RUE and RLE no drift     Coordination - No tremors appreciated    Gait and station - Deffered    Extremities: Normal range of motion, No clubbing, cyanosis or edema  Vascular: Peripheral pulses palpable 2+ bilaterally        LABS:    CARDIAC MARKERS:                                13.8   5.86  )-----------( 240      ( 21 May 2025 05:47 )             40.9     05-21    138  |  104  |  13  ----------------------------<  90  4.0   |  19[L]  |  1.04    Ca    9.1      21 May 2025 05:47  Phos  3.1     05-20  Mg     2.0     05-20    TPro  6.6  /  Alb  3.7  /  TBili  0.6  /  DBili  x   /  AST  18  /  ALT  17  /  AlkPhos  80  05-20    proBNP:   Lipid Profile:   HgA1c:   TSH:     Trace          TELEMETRY: SR	    ECG:  	  RADIOLOGY:   < from: MR Head No Cont (05.20.25 @ 15:02) >    ACC: [id number] EXAM:  MR BRAIN   ORDERED BY: RUSTAM PERES     PROCEDURE DATE:  05/20/2025          INTERPRETATION:  EXAM: MRI OF THE BRAIN WITHOUT CONTRAST    HISTORY: Right MCA stroke, status post mechanical thrombectomy,    TECHNIQUE: Multi-planar multi-sequential MR imaging of the brain was   performed without intravenous contrast.    COMPARISON: CT of the head May 19, 2025.    FINDINGS:    Foci of diffusion restriction identified in the right basal   ganglia/corona radiata and along the high right paramedian frontal lobe,   compatible with acute infarcts. Questionable punctate acute infarct in   the left cerebellar hemisphere, image 41 of series 5. No hydrocephalus.   Foci of increased T2/FLAIR signal in the deep and periventricular white   matter, compatible with chronic small vessel disease. Chronic infarct in   the anterior right corona radiata/centrum semiovale. The visualized extra   axial spaces and basal cisterns are within normal limits. No midline   shift or mass effect present.    The craniocervical junction is within normal limits. The pituitary is   unremarkable. The major intracranial vessels demonstrate the expected   signal void related to vascular flow. Mild mucosal thickening in the   ethmoid air cells. Mucous retention cyst versus polyps in the bilateral   maxillary sinuses, left greater than right. The mastoid air cells are   well aerated. The visualized orbits are within normal limits.      IMPRESSION:    1.  Acute infarcts in the right basal ganglia/corona radiata and high   right paramedian frontal lobe.  2.  Questionable punctate acute infarct in the left cerebellar hemisphere.  3.  Chronic ischemic changes as discussed above.    --- End of Report ---      < end of copied text >  DIAGNOSTIC TESTING:  [ ] Echocardiogram:  [ ]  Catheterization:  [ ] Stress Test:    OTHER:

## 2025-05-21 NOTE — CONSULT NOTE ADULT - SUBJECTIVE AND OBJECTIVE BOX
PULMONARY CONSULT    HPI: 55 y/o M with PMH of HTN. Presents as a code stroke for L sided weakness. Pt was driving, pulled over due to chest pain and SOB. His neighbor called EMS after finding him on the floor with slurred speech and L sided weakness. Found to have R M1 occlusion s/p tenecteplase and mechanical thrombectomy. Also found to have severely decreased LVEF of 15% on echo w/ global LV hypokinesis, severe LV diastolic dysfunction, mild-mod MR. CTA chest neg for PE, notes subtle groundglass opacities likely mild pulmonary edema.          PAST MEDICAL & SURGICAL HISTORY:  HTN (hypertension)  No significant past surgical history        Allergies    No Known Allergies    Intolerances      FAMILY HISTORY:  No pertinent family history in first degree relatives    Social history:     Review of Systems:  CONSTITUTIONAL: No fever, chills, or fatigue  EYES: No eye pain, visual disturbances, or discharge  ENMT:  No difficulty hearing, tinnitus, vertigo; No sinus or throat pain  NECK: No pain or stiffness  RESPIRATORY: Per above  CARDIOVASCULAR: No chest pain, palpitations, dizziness, or leg swelling  GASTROINTESTINAL: No abdominal or epigastric pain. No nausea, vomiting, or hematemesis; No diarrhea or constipation. No melena or hematochezia.  GENITOURINARY: No dysuria, frequency, hematuria, or incontinence  NEUROLOGICAL: No headaches, memory loss, loss of strength, numbness, or tremors  SKIN: No itching, burning, rashes, or lesions   MUSCULOSKELETAL: No joint pain or swelling; No muscle, back, or extremity pain  PSYCHIATRIC: No depression, anxiety, mood swings, or difficulty sleeping      Medications:  MEDICATIONS  (STANDING):  apixaban 5 milliGRAM(s) Oral two times a day  atorvastatin 80 milliGRAM(s) Oral at bedtime  dapagliflozin 10 milliGRAM(s) Oral daily  metoprolol succinate ER 25 milliGRAM(s) Oral daily  nicotine -  14 mG/24Hr(s) Patch 1 Patch Transdermal daily  piperacillin/tazobactam IVPB.. 3.375 Gram(s) IV Intermittent every 8 hours  polyethylene glycol 3350 17 Gram(s) Oral two times a day  senna 2 Tablet(s) Oral at bedtime  tamsulosin 0.4 milliGRAM(s) Oral at bedtime    MEDICATIONS  (PRN):  acetaminophen     Tablet .. 650 milliGRAM(s) Oral every 6 hours PRN Temp greater or equal to 38.5C (101.3F), Mild Pain (1 - 3)      Vital Signs Last 24 Hrs  T(C): 37.1 (21 May 2025 04:00), Max: 38.4 (20 May 2025 17:00)  T(F): 98.8 (21 May 2025 04:00), Max: 101.2 (20 May 2025 17:00)  HR: 82 (21 May 2025 08:00) (76 - 104)  BP: 116/77 (21 May 2025 08:00) (116/77 - 155/96)  BP(mean): 91 (21 May 2025 08:00) (91 - 118)  RR: 23 (21 May 2025 08:00) (17 - 26)  SpO2: 99% (21 May 2025 08:00) (96% - 100%)    Parameters below as of 21 May 2025 08:00  Patient On (Oxygen Delivery Method): room air        05-20 @ 07:01  -  05-21 @ 07:00  --------------------------------------------------------  IN: 1190 mL / OUT: 1950 mL / NET: -760 mL          LABS:                        13.8   5.86  )-----------( 240      ( 21 May 2025 05:47 )             40.9     05-21    138  |  104  |  13  ----------------------------<  90  4.0   |  19[L]  |  1.04    Ca    9.1      21 May 2025 05:47  Phos  3.1     05-20  Mg     2.0     05-20    TPro  6.6  /  Alb  3.7  /  TBili  0.6  /  DBili  x   /  AST  18  /  ALT  17  /  AlkPhos  80  05-20      Urinalysis with Rflx Culture (collected 05-20-25 @ 18:19)        CAPILLARY BLOOD GLUCOSE          Urinalysis Basic - ( 21 May 2025 05:47 )    Color: x / Appearance: x / SG: x / pH: x  Gluc: 90 mg/dL / Ketone: x  / Bili: x / Urobili: x   Blood: x / Protein: x / Nitrite: x   Leuk Esterase: x / RBC: x / WBC x   Sq Epi: x / Non Sq Epi: x / Bacteria: x                    CULTURES: (if applicable)                    Physical Examination:    General: No acute distress.      HEENT: Pupils equal, reactive to light.  Symmetric.    PULM: Clear to auscultation bilaterally, no significant sputum production    CVS: S1, S2    ABD: Soft, nondistended, nontender, normoactive bowel sounds, no masses    EXT: No edema, nontender    SKIN: Warm and well perfused, no rashes noted.    NEURO: Alert, oriented, interactive, nonfocal    RADIOLOGY REVIEWED      CT chest: < from: CT Angio Chest PE Protocol w/ IV Cont (05.19.25 @ 18:06) >    ACC: [id number] EXAM:  CT ABDOMEN AND PELVIS IC   ORDERED BY:  MARIAM QUINONES     ACC: [id number] EXAM:  CT ANGIO CHEST PULM ART WAWIC   ORDERED BY:  MARIAM QUINONES     PROCEDURE DATE:  05/19/2025          INTERPRETATION:  CLINICAL INFORMATION: Ischemic stroke with new DVT.   Evaluate for PE and malignancy    COMPARISON: None.    CONTRAST/COMPLICATIONS:  IV Contrast: Omnipaque 350  90 cc administered   10 cc discarded  Oral Contrast: NONE  .    PROCEDURE:  CT Angiography of the Chest was performed followed by portal venous phase   imaging of the Abdomen and Pelvis.  Sagittal and coronal reformats were performed as well as 3D (MIP)   reconstructions.    FINDINGS:  CHEST:  LUNGS AND LARGE AIRWAYS: Patent central airways. Subtle central   predominant groundglass opacities with scattered septal thickening.  PLEURA: No pleural effusion.  VESSELS: No pulmonary embolism.  HEART: Heart is enlarged. No pericardial effusion.  MEDIASTINUM AND JOHNNY: No lymphadenopathy.  CHEST WALL AND LOWER NECK: Within normal limits.    ABDOMEN AND PELVIS:  LIVER: Heterogeneous, predominantly hypodense lesion with peripheral   nodular enhancement in segment 6 measuring 1.6 x 0.7 cm (10, 55). Vague   indeterminate lesion in segment 7 measuring 1.1 x 1.2 cm (10:21) and   additional indeterminate lesion in segment 6 measuring 1.8 x 1.9 cm   (10:39). subcentimeter hypodensities too small to characterize.  BILE DUCTS: Normal caliber.  GALLBLADDER: Cholelithiasis.  SPLEEN: Within normal limits.  PANCREAS: Within normal limits.  ADRENALS: Within normal limits.  KIDNEYS/URETERS: Bilateral subcentimeter hypodensities, too small to   characterize. Symmetric renal enhancement. No hydronephrosis.    BLADDER: Within normal limits.  REPRODUCTIVE ORGANS: Enlarged prostate measuring 6 cm in transverse   diameter.    BOWEL: No bowel obstruction. Appendix is normal.  PERITONEUM/RETROPERITONEUM: Within normal limits.  VESSELS: Atherosclerotic changes.  LYMPH NODES: No lymphadenopathy.  ABDOMINAL WALL: Small amount of subcutaneous emphysema along the right   hip.  BONES: Degenerative changes.    IMPRESSION:  No pulmonary embolism.    Subtle central predominant groundglass opacities with septal thickening,   which are nonspecific but may be seen in setting of pulmonary edema.    Multiple liver lesions, likely a combination of cysts and hemangioma.   Additional indeterminate lesions in the right hepatic lobe, for further   evaluation with MRI with and without contrast may be obtained.      < end of copied text >      TTE:< from: TTE W or WO Ultrasound Enhancing Agent (05.19.25 @ 09:02) >    TRANSTHORACIC ECHOCARDIOGRAM REPORT  ________________________________________________________________________________                                      _______       Pt. Name:       TIFFANY SANCHEZ Study Date:    5/19/2025  MRN:            [id number]   YOB: 1970  Accession #:    [id number]    Age:           54 years  Account#:       [id number] Gender:        M  Heart Rate:     95 bpm       Height:        69.00 in (175.26 cm)  Rhythm:                      Weight:        125.00 lb(56.70 kg)  Blood Pressure: 156/99 mmHg  BSA/BMI:       1.69 m² / 18.46 kg/m²  ________________________________________________________________________________________  Referring Physician:    [id number] Nishi Hein  Interpreting Physician: Madelaine Chen MD  Primary Sonographer:    Nena Moffett RDCS    CPT:                ECHO TTE WITH CON COMP W DOPP - .m;DEFINITY ECHO                      CONTRAST PER ML - .m;DEFINITY ECHO CONTRAST PER ML                      WASTED - .m  Indication(s):      Abnormal electrocardiogram ECG EKG - R94.31  Procedure:          Transthoracic echocardiogram with 2-D, M-mode and complete                      spectral and color flow Doppler.  Ordering Location:  [facility]  Admission Status:   Inpatient  Contrast Injection: Verbal consent was obtained for injection of Ultrasonic                      Enhancing Agent following a discussion of risks and                      benefits.                      Endocardial visualization enhanced with 2 ml of Definity                      Ultrasound enhancing agent (Lot#:[id number] Exp.Date:2025-APR                      Discarded Dose:8ml).  UEA Reaction:       Patient had no adverse reaction after injection of                      Ultrasound Enhancing Agent.  Agitated Saline:    Injection with agitated saline was performed to evaluate for                      intracardiac shunting.  Study Information:  Image quality for this study is adequate.    _______________________________________________________________________________________     CONCLUSIONS:      1. Left ventricular cavity is mildly dilated. Left ventricular systolic function is severely decreased with an ejection fraction of 15 % by Greene's method of disks. Global left ventricular hypokinesis.   2. There is severe (grade 3) left ventricular diastolic dysfunction, with elevated left ventricular filling pressure.   3. There is increased LV mass and eccentric hypertrophy.   4. Normal right ventricular cavity size, with normal wall thickness,and reduced right ventricular systolic function.   5. Mild to moderate mitral regurgitation. Mechanism of mitral regurgitation: The mechanism of mitral regurgitation is secondary due to left venrticular dysfunction.   6. No pericardial effusion seen.   7. Agitated saline injection was negative for intracardiac shunt.   8. Pulmonary artery systolic pressure could not be estimated.   9. Findings were discussed with LUIS FERNANDO Pleitez on 5/19/2025.    ________________________________________________________________________________________  FINDINGS:     Left Ventricle:  After obtaining consent, Definity ultrasound enhancing agent was given for enhanced left ventricular opacification and improved delineation of the left ventricular endocardial borders. The left ventricular cavity is mildly dilated. Left ventricular systolic function is severely decreased with a calculated ejection fraction of 15 % by the Greene's biplane method of disks. There is global left ventricular hypokinesis. There is increased LV mass and eccentric hypertrophy. There is no evidence of a left ventricular thrombus. There is severe (grade 3) left ventricular diastolic dysfunction, with elevated left ventricular filling pressure.     Right Ventricle:  After obtaining consent, Definity intravenous contrast was given for enhanced right heart opacification and improved delineation of the endocardial borders. The right ventricular cavity is normal in size, with normal wall thickness and right ventricular systolic function is reduced. Tricuspid annular plane systolic excursion (TAPSE) is 2.2 cm (normal >=1.7 cm). Tricuspid annular tissue Doppler S' is 14.6 cm/s (normal >10 cm/s).     Left Atrium:  The left atrium is severely dilated with an indexed volume of 55.92 ml/m².     Right Atrium:  The right atrium is normal in size with an indexed volume of 15.31 ml/m² and an indexed area of 6.68 cm²/m².     Interatrial Septum:  The interatrial septum appears intact. Agitated saline injection was negative for intracardiac shunt.     Aortic Valve:  The aortic valve is tricuspid with normal leaflet excursion. There is no aortic valve stenosis. There is trace aortic regurgitation. AI VMax is 3.87 m/s. AI pressure half time is 215 msec. AI slope is 5.23 m/s².     Mitral Valve:  Structurally normal mitral valve with normal leaflet excursion. There is no mitral valve stenosis. There is mild to moderate mitral regurgitation. The mechanism of mitral regurgitation is secondary due to left venrticular dysfunction.     Tricuspid Valve:  The tricuspid valve is structurally normal with normal leaflet excursion. There is trace tricuspid regurgitation. There is insufficient tricuspid regurgitation detected to calculate pulmonary artery systolic pressure.     Pulmonic Valve:  Structurally normal pulmonic valve with normal leaflet excursion. There is trace pulmonic regurgitation.     Aorta:  The aortic root appears normal in size.     Pericardium:  No pericardial effusion seen.     Systemic Veins:  The inferior vena cava is dilated measuring 2.47 cm in diameter, (dilated >2.1cm) with normal inspiratory collapse (normal >50%) consistent with mildly elevated right atrial pressure (~8, range 5-10mmHg).  ____________________________________________________________________  QUANTITATIVE DATA:  Left Ventricle Measurements: (Indexed to BSA)     IVSd (2D):   0.9 cm  LVPWd (2D):  1.0 cm  LVIDd (2D):  6.0 cm  LVIDs (2D):  5.6 cm  LV Mass:     226 g  133.3 g/m²  BiPlane LV EF%: 15 %     MV E Vmax:    1.04 m/s  MV A Vmax: 0.33 m/s  MV E/A:       3.15  e' lateral:   8.85 cm/s  e' medial:    4.88 cm/s  E/e' lateral: 11.75  E/e' medial:  21.31  E/e' Average: 15.15    Aorta Measurements: (Normal range) (Indexed to BSA)     Ao Root d     3.00 cm (3.1 - 3.7 cm) 1.77 cm/m²  Ao Asc d, 2D: 2.60            Left Atrium Measurements: (Indexed to BSA)  LA Diam 2D:        4.70 cm  LA Vol s, MOD A4C: 90.60 ml.  LA Vol s, MOD A2C: 98.00 ml.  LA Vol s, MOD BP:  94.60 ml  55.92 ml/m²         Right Ventricle Measurements: Right Atrial Measurements:     TAPSE:      2.2 cm            RA Vol s, MOD A4C         25.9 ml  RV S' Vmax: 14.60 cm/s        RA Vol s, MOD A4C i BSA   15.31 ml/m²                                RA Area s, MOD A4C        11.3 cm²  RA Area s, MOD A4C, i BSA 6.68 cm²/m²       LVOT / RVOT/ Qp/Qs Data: (Indexed to BSA)  LVOT Diameter,s: 1.80 cm  LVOT Area:       2.54 cm²  LVOT Vmax:       0.95 m/s  LVOT Vmn:        0.597 m/s  LVOT VTI:        13.00 cm  LVOT peak grad:  4 mmHg  LVOT mean grad:  1.5 mmHg  LVOT SV:         33.1 ml   19.56 ml/m²    Aortic Valve Measurements:  AV Vmax:          1.4 m/s  AV Peak Gradient: 8.4 mmHg  AR Vmax           3.87 m/s  AR PHT            215 msec  AR Harding          5.23 m/s²    Mitral Valve Measurements:     MV E Vmax: 1.0 m/s  MV A Vmax: 0.3 m/s  MV E/A:    3.2       Tricuspid Valve Measurements:     TV S'        14.6 cm/s  RA Pressure: 8 mmHg    ________________________________________________________________________________________  Electronically signed on 5/19/2025 at 3:55:29 PM by Madelaine Chen MD         < end of copied text >     PULMONARY CONSULT    HPI: 53 y/o M with PMH of HTN. Presents as a code stroke for L sided weakness. Pt was driving, pulled over due to chest pain and SOB. His neighbor called EMS after finding him on the floor with slurred speech and L sided weakness. Found to have R M1 occlusion s/p tenecteplase and mechanical thrombectomy. Also found to have severely decreased LVEF of 15% on echo w/ global LV hypokinesis, severe LV diastolic dysfunction, mild-mod MR. +LE DVT. CTA chest neg for PE, notes subtle groundglass opacities likely mild pulmonary edema. Course c/b fevers, tmax 101.2 F 5/20.          PAST MEDICAL & SURGICAL HISTORY:  HTN (hypertension)  No significant past surgical history        Allergies    No Known Allergies    Intolerances      FAMILY HISTORY:  No pertinent family history in first degree relatives    Social history:     Review of Systems:  CONSTITUTIONAL: No fever, chills, or fatigue  EYES: No eye pain, visual disturbances, or discharge  ENMT:  No difficulty hearing, tinnitus, vertigo; No sinus or throat pain  NECK: No pain or stiffness  RESPIRATORY: Per above  CARDIOVASCULAR: No chest pain, palpitations, dizziness, or leg swelling  GASTROINTESTINAL: No abdominal or epigastric pain. No nausea, vomiting, or hematemesis; No diarrhea or constipation. No melena or hematochezia.  GENITOURINARY: No dysuria, frequency, hematuria, or incontinence  NEUROLOGICAL: No headaches, memory loss, loss of strength, numbness, or tremors  SKIN: No itching, burning, rashes, or lesions   MUSCULOSKELETAL: No joint pain or swelling; No muscle, back, or extremity pain  PSYCHIATRIC: No depression, anxiety, mood swings, or difficulty sleeping      Medications:  MEDICATIONS  (STANDING):  apixaban 5 milliGRAM(s) Oral two times a day  atorvastatin 80 milliGRAM(s) Oral at bedtime  dapagliflozin 10 milliGRAM(s) Oral daily  metoprolol succinate ER 25 milliGRAM(s) Oral daily  nicotine -  14 mG/24Hr(s) Patch 1 Patch Transdermal daily  piperacillin/tazobactam IVPB.. 3.375 Gram(s) IV Intermittent every 8 hours  polyethylene glycol 3350 17 Gram(s) Oral two times a day  senna 2 Tablet(s) Oral at bedtime  tamsulosin 0.4 milliGRAM(s) Oral at bedtime    MEDICATIONS  (PRN):  acetaminophen     Tablet .. 650 milliGRAM(s) Oral every 6 hours PRN Temp greater or equal to 38.5C (101.3F), Mild Pain (1 - 3)      Vital Signs Last 24 Hrs  T(C): 37.1 (21 May 2025 04:00), Max: 38.4 (20 May 2025 17:00)  T(F): 98.8 (21 May 2025 04:00), Max: 101.2 (20 May 2025 17:00)  HR: 82 (21 May 2025 08:00) (76 - 104)  BP: 116/77 (21 May 2025 08:00) (116/77 - 155/96)  BP(mean): 91 (21 May 2025 08:00) (91 - 118)  RR: 23 (21 May 2025 08:00) (17 - 26)  SpO2: 99% (21 May 2025 08:00) (96% - 100%)    Parameters below as of 21 May 2025 08:00  Patient On (Oxygen Delivery Method): room air        05-20 @ 07:01  -  05-21 @ 07:00  --------------------------------------------------------  IN: 1190 mL / OUT: 1950 mL / NET: -760 mL          LABS:                        13.8   5.86  )-----------( 240      ( 21 May 2025 05:47 )             40.9     05-21    138  |  104  |  13  ----------------------------<  90  4.0   |  19[L]  |  1.04    Ca    9.1      21 May 2025 05:47  Phos  3.1     05-20  Mg     2.0     05-20    TPro  6.6  /  Alb  3.7  /  TBili  0.6  /  DBili  x   /  AST  18  /  ALT  17  /  AlkPhos  80  05-20      Urinalysis with Rflx Culture (collected 05-20-25 @ 18:19)        CAPILLARY BLOOD GLUCOSE          Urinalysis Basic - ( 21 May 2025 05:47 )    Color: x / Appearance: x / SG: x / pH: x  Gluc: 90 mg/dL / Ketone: x  / Bili: x / Urobili: x   Blood: x / Protein: x / Nitrite: x   Leuk Esterase: x / RBC: x / WBC x   Sq Epi: x / Non Sq Epi: x / Bacteria: x                    CULTURES: (if applicable)                    Physical Examination:    General: No acute distress.      HEENT: Pupils equal, reactive to light.  Symmetric.    PULM: Clear to auscultation bilaterally, no significant sputum production    CVS: S1, S2    ABD: Soft, nondistended, nontender, normoactive bowel sounds, no masses    EXT: No edema, nontender    SKIN: Warm and well perfused, no rashes noted.    NEURO: Alert, oriented, interactive, nonfocal    RADIOLOGY REVIEWED      CT chest: < from: CT Angio Chest PE Protocol w/ IV Cont (05.19.25 @ 18:06) >    ACC: [id number] EXAM:  CT ABDOMEN AND PELVIS IC   ORDERED BY:  MARIAM QUINONES     ACC: [id number] EXAM:  CT ANGIO CHEST PULM ART WAWIC   ORDERED BY:  MARIAM QUINONES     PROCEDURE DATE:  05/19/2025          INTERPRETATION:  CLINICAL INFORMATION: Ischemic stroke with new DVT.   Evaluate for PE and malignancy    COMPARISON: None.    CONTRAST/COMPLICATIONS:  IV Contrast: Omnipaque 350  90 cc administered   10 cc discarded  Oral Contrast: NONE  .    PROCEDURE:  CT Angiography of the Chest was performed followed by portal venous phase   imaging of the Abdomen and Pelvis.  Sagittal and coronal reformats were performed as well as 3D (MIP)   reconstructions.    FINDINGS:  CHEST:  LUNGS AND LARGE AIRWAYS: Patent central airways. Subtle central   predominant groundglass opacities with scattered septal thickening.  PLEURA: No pleural effusion.  VESSELS: No pulmonary embolism.  HEART: Heart is enlarged. No pericardial effusion.  MEDIASTINUM AND JOHNNY: No lymphadenopathy.  CHEST WALL AND LOWER NECK: Within normal limits.    ABDOMEN AND PELVIS:  LIVER: Heterogeneous, predominantly hypodense lesion with peripheral   nodular enhancement in segment 6 measuring 1.6 x 0.7 cm (10, 55). Vague   indeterminate lesion in segment 7 measuring 1.1 x 1.2 cm (10:21) and   additional indeterminate lesion in segment 6 measuring 1.8 x 1.9 cm   (10:39). subcentimeter hypodensities too small to characterize.  BILE DUCTS: Normal caliber.  GALLBLADDER: Cholelithiasis.  SPLEEN: Within normal limits.  PANCREAS: Within normal limits.  ADRENALS: Within normal limits.  KIDNEYS/URETERS: Bilateral subcentimeter hypodensities, too small to   characterize. Symmetric renal enhancement. No hydronephrosis.    BLADDER: Within normal limits.  REPRODUCTIVE ORGANS: Enlarged prostate measuring 6 cm in transverse   diameter.    BOWEL: No bowel obstruction. Appendix is normal.  PERITONEUM/RETROPERITONEUM: Within normal limits.  VESSELS: Atherosclerotic changes.  LYMPH NODES: No lymphadenopathy.  ABDOMINAL WALL: Small amount of subcutaneous emphysema along the right   hip.  BONES: Degenerative changes.    IMPRESSION:  No pulmonary embolism.    Subtle central predominant groundglass opacities with septal thickening,   which are nonspecific but may be seen in setting of pulmonary edema.    Multiple liver lesions, likely a combination of cysts and hemangioma.   Additional indeterminate lesions in the right hepatic lobe, for further   evaluation with MRI with and without contrast may be obtained.      < end of copied text >      TTE:< from: TTE W or WO Ultrasound Enhancing Agent (05.19.25 @ 09:02) >    TRANSTHORACIC ECHOCARDIOGRAM REPORT  ________________________________________________________________________________                                      _______       Pt. Name:       TIFFANY SANCHEZ Study Date:    5/19/2025  MRN:            [id number]   YOB: 1970  Accession #:    [id number]    Age:           54 years  Account#:       [id number] Gender:        M  Heart Rate:     95 bpm       Height:        69.00 in (175.26 cm)  Rhythm:                      Weight:        125.00 lb(56.70 kg)  Blood Pressure: 156/99 mmHg  BSA/BMI:       1.69 m² / 18.46 kg/m²  ________________________________________________________________________________________  Referring Physician:    [id number] Nishi Hein  Interpreting Physician: Madelaine Chen MD  Primary Sonographer:    Nena Moffett [facility]    CPT:                ECHO TTE WITH CON COMP W DOPP - .m;DEFINITY ECHO                      CONTRAST PER ML - .m;DEFINITY ECHO CONTRAST PER ML                      WASTED - .m  Indication(s):      Abnormal electrocardiogram ECG EKG - R94.31  Procedure:          Transthoracic echocardiogram with 2-D, M-mode and complete                      spectral and color flow Doppler.  Ordering Location:  [facility]  Admission Status:   Inpatient  Contrast Injection: Verbal consent was obtained for injection of Ultrasonic                      Enhancing Agent following a discussion of risks and                      benefits.                      Endocardial visualization enhanced with 2 ml of Definity                      Ultrasound enhancing agent (Lot#:[id number] Exp.Date:2025-APR                      Discarded Dose:8ml).  UEA Reaction:       Patient had no adverse reaction after injection of                      Ultrasound Enhancing Agent.  Agitated Saline:    Injection with agitated saline was performed to evaluate for                      intracardiac shunting.  Study Information:  Image quality for this study is adequate.    _______________________________________________________________________________________     CONCLUSIONS:      1. Left ventricular cavity is mildly dilated. Left ventricular systolic function is severely decreased with an ejection fraction of 15 % by Greene's method of disks. Global left ventricular hypokinesis.   2. There is severe (grade 3) left ventricular diastolic dysfunction, with elevated left ventricular filling pressure.   3. There is increased LV mass and eccentric hypertrophy.   4. Normal right ventricular cavity size, with normal wall thickness,and reduced right ventricular systolic function.   5. Mild to moderate mitral regurgitation. Mechanism of mitral regurgitation: The mechanism of mitral regurgitation is secondary due to left venrticular dysfunction.   6. No pericardial effusion seen.   7. Agitated saline injection was negative for intracardiac shunt.   8. Pulmonary artery systolic pressure could not be estimated.   9. Findings were discussed with LUIS FERNANDO Pleitez on 5/19/2025.    ________________________________________________________________________________________  FINDINGS:     Left Ventricle:  After obtaining consent, Definity ultrasound enhancing agent was given for enhanced left ventricular opacification and improved delineation of the left ventricular endocardial borders. The left ventricular cavity is mildly dilated. Left ventricular systolic function is severely decreased with a calculated ejection fraction of 15 % by the Greene's biplane method of disks. There is global left ventricular hypokinesis. There is increased LV mass and eccentric hypertrophy. There is no evidence of a left ventricular thrombus. There is severe (grade 3) left ventricular diastolic dysfunction, with elevated left ventricular filling pressure.     Right Ventricle:  After obtaining consent, Definity intravenous contrast was given for enhanced right heart opacification and improved delineation of the endocardial borders. The right ventricular cavity is normal in size, with normal wall thickness and right ventricular systolic function is reduced. Tricuspid annular plane systolic excursion (TAPSE) is 2.2 cm (normal >=1.7 cm). Tricuspid annular tissue Doppler S' is 14.6 cm/s (normal >10 cm/s).     Left Atrium:  The left atrium is severely dilated with an indexed volume of 55.92 ml/m².     Right Atrium:  The right atrium is normal in size with an indexed volume of 15.31 ml/m² and an indexed area of 6.68 cm²/m².     Interatrial Septum:  The interatrial septum appears intact. Agitated saline injection was negative for intracardiac shunt.     Aortic Valve:  The aortic valve is tricuspid with normal leaflet excursion. There is no aortic valve stenosis. There is trace aortic regurgitation. AI VMax is 3.87 m/s. AI pressure half time is 215 msec. AI slope is 5.23 m/s².     Mitral Valve:  Structurally normal mitral valve with normal leaflet excursion. There is no mitral valve stenosis. There is mild to moderate mitral regurgitation. The mechanism of mitral regurgitation is secondary due to left venrticular dysfunction.     Tricuspid Valve:  The tricuspid valve is structurally normal with normal leaflet excursion. There is trace tricuspid regurgitation. There is insufficient tricuspid regurgitation detected to calculate pulmonary artery systolic pressure.     Pulmonic Valve:  Structurally normal pulmonic valve with normal leaflet excursion. There is trace pulmonic regurgitation.     Aorta:  The aortic root appears normal in size.     Pericardium:  No pericardial effusion seen.     Systemic Veins:  The inferior vena cava is dilated measuring 2.47 cm in diameter, (dilated >2.1cm) with normal inspiratory collapse (normal >50%) consistent with mildly elevated right atrial pressure (~8, range 5-10mmHg).  ____________________________________________________________________  QUANTITATIVE DATA:  Left Ventricle Measurements: (Indexed to BSA)     IVSd (2D):   0.9 cm  LVPWd (2D):  1.0 cm  LVIDd (2D):  6.0 cm  LVIDs (2D):  5.6 cm  LV Mass:     226 g  133.3 g/m²  BiPlane LV EF%: 15 %     MV E Vmax:    1.04 m/s  MV A Vmax: 0.33 m/s  MV E/A:       3.15  e' lateral:   8.85 cm/s  e' medial:    4.88 cm/s  E/e' lateral: 11.75  E/e' medial:  21.31  E/e' Average: 15.15    Aorta Measurements: (Normal range) (Indexed to BSA)     Ao Root d     3.00 cm (3.1 - 3.7 cm) 1.77 cm/m²  Ao Asc d, 2D: 2.60            Left Atrium Measurements: (Indexed to BSA)  LA Diam 2D:        4.70 cm  LA Vol s, MOD A4C: 90.60 ml.  LA Vol s, MOD A2C: 98.00 ml.  LA Vol s, MOD BP:  94.60 ml  55.92 ml/m²         Right Ventricle Measurements: Right Atrial Measurements:     TAPSE:      2.2 cm            RA Vol s, MOD A4C         25.9 ml  RV S' Vmax: 14.60 cm/s        RA Vol s, MOD A4C i BSA   15.31 ml/m²                                RA Area s, MOD A4C        11.3 cm²  RA Area s, MOD A4C, i BSA 6.68 cm²/m²       LVOT / RVOT/ Qp/Qs Data: (Indexed to BSA)  LVOT Diameter,s: 1.80 cm  LVOT Area:       2.54 cm²  LVOT Vmax:       0.95 m/s  LVOT Vmn:        0.597 m/s  LVOT VTI:        13.00 cm  LVOT peak grad:  4 mmHg  LVOT mean grad:  1.5 mmHg  LVOT SV:         33.1 ml   19.56 ml/m²    Aortic Valve Measurements:  AV Vmax:          1.4 m/s  AV Peak Gradient: 8.4 mmHg  AR Vmax           3.87 m/s  AR PHT            215 msec  AR [place]          5.23 m/s²    Mitral Valve Measurements:     MV E Vmax: 1.0 m/s  MV A Vmax: 0.3 m/s  MV E/A:    3.2       Tricuspid Valve Measurements:     TV S'        14.6 cm/s  RA Pressure: 8 mmHg    ________________________________________________________________________________________  Electronically signed on 5/19/2025 at 3:55:29 PM by Madelaine Chen MD         < end of copied text >     PULMONARY CONSULT    HPI: 53 y/o M with PMH of HTN. Presents as a code stroke for L sided weakness. Pt was driving, pulled over due to chest pain and SOB. His neighbor called EMS after finding him on the floor with slurred speech and L sided weakness. Found to have R M1 occlusion s/p tenecteplase and mechanical thrombectomy. Also found to have severely decreased LVEF of 15% on echo w/ global LV hypokinesis, severe LV diastolic dysfunction, mild-mod MR. +LE DVT. CTA chest neg for PE, notes subtle groundglass opacities likely mild pulmonary edema. Course c/b fevers, tmax 101.2 F 5/20. Current smoker, pt reports 1 ppd x42 years. Denies hx of diagnosed lung disease, inhaler use. SOB now resolved. Denies CP, cough, sputum production.          PAST MEDICAL & SURGICAL HISTORY:  HTN (hypertension)  No significant past surgical history        Allergies    No Known Allergies    Intolerances      FAMILY HISTORY:  No pertinent family history in first degree relatives    Social history: current smoker     Review of Systems:  CONSTITUTIONAL: No fever, chills, or fatigue  EYES: No eye pain, visual disturbances, or discharge  ENMT:  No difficulty hearing, tinnitus, vertigo; No sinus or throat pain  NECK: No pain or stiffness  RESPIRATORY: Per above  CARDIOVASCULAR: No chest pain, palpitations, dizziness, or leg swelling  GASTROINTESTINAL: No abdominal or epigastric pain. No nausea, vomiting, or hematemesis; No diarrhea or constipation. No melena or hematochezia.  GENITOURINARY: No dysuria, frequency, hematuria, or incontinence  NEUROLOGICAL: No headaches, memory loss, loss of strength, numbness, or tremors  SKIN: No itching, burning, rashes, or lesions   MUSCULOSKELETAL: No joint pain or swelling; No muscle, back, or extremity pain  PSYCHIATRIC: No depression, anxiety, mood swings, or difficulty sleeping      Medications:  MEDICATIONS  (STANDING):  apixaban 5 milliGRAM(s) Oral two times a day  atorvastatin 80 milliGRAM(s) Oral at bedtime  dapagliflozin 10 milliGRAM(s) Oral daily  metoprolol succinate ER 25 milliGRAM(s) Oral daily  nicotine -  14 mG/24Hr(s) Patch 1 Patch Transdermal daily  piperacillin/tazobactam IVPB.. 3.375 Gram(s) IV Intermittent every 8 hours  polyethylene glycol 3350 17 Gram(s) Oral two times a day  senna 2 Tablet(s) Oral at bedtime  tamsulosin 0.4 milliGRAM(s) Oral at bedtime    MEDICATIONS  (PRN):  acetaminophen     Tablet .. 650 milliGRAM(s) Oral every 6 hours PRN Temp greater or equal to 38.5C (101.3F), Mild Pain (1 - 3)      Vital Signs Last 24 Hrs  T(C): 37.1 (21 May 2025 04:00), Max: 38.4 (20 May 2025 17:00)  T(F): 98.8 (21 May 2025 04:00), Max: 101.2 (20 May 2025 17:00)  HR: 82 (21 May 2025 08:00) (76 - 104)  BP: 116/77 (21 May 2025 08:00) (116/77 - 155/96)  BP(mean): 91 (21 May 2025 08:00) (91 - 118)  RR: 23 (21 May 2025 08:00) (17 - 26)  SpO2: 99% (21 May 2025 08:00) (96% - 100%)    Parameters below as of 21 May 2025 08:00  Patient On (Oxygen Delivery Method): room air        05-20 @ 07:01  -  05-21 @ 07:00  --------------------------------------------------------  IN: 1190 mL / OUT: 1950 mL / NET: -760 mL          LABS:                        13.8   5.86  )-----------( 240      ( 21 May 2025 05:47 )             40.9     05-21    138  |  104  |  13  ----------------------------<  90  4.0   |  19[L]  |  1.04    Ca    9.1      21 May 2025 05:47  Phos  3.1     05-20  Mg     2.0     05-20    TPro  6.6  /  Alb  3.7  /  TBili  0.6  /  DBili  x   /  AST  18  /  ALT  17  /  AlkPhos  80  05-20      Urinalysis with Rflx Culture (collected 05-20-25 @ 18:19)        CAPILLARY BLOOD GLUCOSE          Urinalysis Basic - ( 21 May 2025 05:47 )    Color: x / Appearance: x / SG: x / pH: x  Gluc: 90 mg/dL / Ketone: x  / Bili: x / Urobili: x   Blood: x / Protein: x / Nitrite: x   Leuk Esterase: x / RBC: x / WBC x   Sq Epi: x / Non Sq Epi: x / Bacteria: x                    CULTURES: (if applicable)                    Physical Examination:    General: No acute distress.      HEENT: Pupils equal, reactive to light.  Symmetric.    PULM: Clear to auscultation bilaterally, no significant sputum production    CVS: S1, S2    ABD: Soft, nondistended, nontender, normoactive bowel sounds, no masses    EXT: No edema, nontender    SKIN: Warm and well perfused, no rashes noted.    NEURO: Alert, oriented, interactive, nonfocal    RADIOLOGY REVIEWED      CT chest: < from: CT Angio Chest PE Protocol w/ IV Cont (05.19.25 @ 18:06) >    ACC: [id number] EXAM:  CT ABDOMEN AND PELVIS IC   ORDERED BY:  MARIAM QUINONES     ACC: [id number] EXAM:  CT ANGIO CHEST PULM ART WAWIC   ORDERED BY:  MARIAM QUINONES     PROCEDURE DATE:  05/19/2025          INTERPRETATION:  CLINICAL INFORMATION: Ischemic stroke with new DVT.   Evaluate for PE and malignancy    COMPARISON: None.    CONTRAST/COMPLICATIONS:  IV Contrast: Omnipaque 350  90 cc administered   10 cc discarded  Oral Contrast: NONE  .    PROCEDURE:  CT Angiography of the Chest was performed followed by portal venous phase   imaging of the Abdomen and Pelvis.  Sagittal and coronal reformats were performed as well as 3D (MIP)   reconstructions.    FINDINGS:  CHEST:  LUNGS AND LARGE AIRWAYS: Patent central airways. Subtle central   predominant groundglass opacities with scattered septal thickening.  PLEURA: No pleural effusion.  VESSELS: No pulmonary embolism.  HEART: Heart is enlarged. No pericardial effusion.  MEDIASTINUM AND JOHNNY: No lymphadenopathy.  CHEST WALL AND LOWER NECK: Within normal limits.    ABDOMEN AND PELVIS:  LIVER: Heterogeneous, predominantly hypodense lesion with peripheral   nodular enhancement in segment 6 measuring 1.6 x 0.7 cm (10, 55). Vague   indeterminate lesion in segment 7 measuring 1.1 x 1.2 cm (10:21) and   additional indeterminate lesion in segment 6 measuring 1.8 x 1.9 cm   (10:39). subcentimeter hypodensities too small to characterize.  BILE DUCTS: Normal caliber.  GALLBLADDER: Cholelithiasis.  SPLEEN: Within normal limits.  PANCREAS: Within normal limits.  ADRENALS: Within normal limits.  KIDNEYS/URETERS: Bilateral subcentimeter hypodensities, too small to   characterize. Symmetric renal enhancement. No hydronephrosis.    BLADDER: Within normal limits.  REPRODUCTIVE ORGANS: Enlarged prostate measuring 6 cm in transverse   diameter.    BOWEL: No bowel obstruction. Appendix is normal.  PERITONEUM/RETROPERITONEUM: Within normal limits.  VESSELS: Atherosclerotic changes.  LYMPH NODES: No lymphadenopathy.  ABDOMINAL WALL: Small amount of subcutaneous emphysema along the right   hip.  BONES: Degenerative changes.    IMPRESSION:  No pulmonary embolism.    Subtle central predominant groundglass opacities with septal thickening,   which are nonspecific but may be seen in setting of pulmonary edema.    Multiple liver lesions, likely a combination of cysts and hemangioma.   Additional indeterminate lesions in the right hepatic lobe, for further   evaluation with MRI with and without contrast may be obtained.      < end of copied text >      TTE:< from: TTE W or WO Ultrasound Enhancing Agent (05.19.25 @ 09:02) >    TRANSTHORACIC ECHOCARDIOGRAM REPORT  ________________________________________________________________________________                                      _______       Pt. Name:       TIFFANY SANCHEZ Study Date:    5/19/2025  MRN:            [id number]   YOB: 1970  Accession #:    [id number]    Age:           54 years  Account#:       [id number] Gender:        M  Heart Rate:     95 bpm       Height:        69.00 in (175.26 cm)  Rhythm:                      Weight:        125.00 lb(56.70 kg)  Blood Pressure: 156/99 mmHg  BSA/BMI:       1.69 m² / 18.46 kg/m²  ________________________________________________________________________________________  Referring Physician:    [id number] Nishi Hein  Interpreting Physician: Madelaine Chen MD  Primary Sonographer:    Nena Moffett CS    CPT:                ECHO TTE WITH CON COMP W DOPP - .m;DEFINITY ECHO                      CONTRAST PER ML - .m;DEFINITY ECHO CONTRAST PER ML                      WASTED - .m  Indication(s):      Abnormal electrocardiogram ECG EKG - R94.31  Procedure:          Transthoracic echocardiogram with 2-D, M-mode and complete                      spectral and color flow Doppler.  Ordering Location:  [facility]  Admission Status:   Inpatient  Contrast Injection: Verbal consent was obtained for injection of Ultrasonic                      Enhancing Agent following a discussion of risks and                      benefits.                      Endocardial visualization enhanced with 2 ml of Definity                      Ultrasound enhancing agent (Lot#:[id number] Exp.Date:2025-APR                      Discarded Dose:8ml).  UEA Reaction:       Patient had no adverse reaction after injection of                      Ultrasound Enhancing Agent.  Agitated Saline:    Injection with agitated saline was performed to evaluate for                      intracardiac shunting.  Study Information:  Image quality for this study is adequate.    _______________________________________________________________________________________     CONCLUSIONS:      1. Left ventricular cavity is mildly dilated. Left ventricular systolic function is severely decreased with an ejection fraction of 15 % by Greene's method of disks. Global left ventricular hypokinesis.   2. There is severe (grade 3) left ventricular diastolic dysfunction, with elevated left ventricular filling pressure.   3. There is increased LV mass and eccentric hypertrophy.   4. Normal right ventricular cavity size, with normal wall thickness,and reduced right ventricular systolic function.   5. Mild to moderate mitral regurgitation. Mechanism of mitral regurgitation: The mechanism of mitral regurgitation is secondary due to left venrticular dysfunction.   6. No pericardial effusion seen.   7. Agitated saline injection was negative for intracardiac shunt.   8. Pulmonary artery systolic pressure could not be estimated.   9. Findings were discussed with LUIS FERNANDO Pleitez on 5/19/2025.    ________________________________________________________________________________________  FINDINGS:     Left Ventricle:  After obtaining consent, Definity ultrasound enhancing agent was given for enhanced left ventricular opacification and improved delineation of the left ventricular endocardial borders. The left ventricular cavity is mildly dilated. Left ventricular systolic function is severely decreased with a calculated ejection fraction of 15 % by the Greene's biplane method of disks. There is global left ventricular hypokinesis. There is increased LV mass and eccentric hypertrophy. There is no evidence of a left ventricular thrombus. There is severe (grade 3) left ventricular diastolic dysfunction, with elevated left ventricular filling pressure.     Right Ventricle:  After obtaining consent, Definity intravenous contrast was given for enhanced right heart opacification and improved delineation of the endocardial borders. The right ventricular cavity is normal in size, with normal wall thickness and right ventricular systolic function is reduced. Tricuspid annular plane systolic excursion (TAPSE) is 2.2 cm (normal >=1.7 cm). Tricuspid annular tissue Doppler S' is 14.6 cm/s (normal >10 cm/s).     Left Atrium:  The left atrium is severely dilated with an indexed volume of 55.92 ml/m².     Right Atrium:  The right atrium is normal in size with an indexed volume of 15.31 ml/m² and an indexed area of 6.68 cm²/m².     Interatrial Septum:  The interatrial septum appears intact. Agitated saline injection was negative for intracardiac shunt.     Aortic Valve:  The aortic valve is tricuspid with normal leaflet excursion. There is no aortic valve stenosis. There is trace aortic regurgitation. AI VMax is 3.87 m/s. AI pressure half time is 215 msec. AI slope is 5.23 m/s².     Mitral Valve:  Structurally normal mitral valve with normal leaflet excursion. There is no mitral valve stenosis. There is mild to moderate mitral regurgitation. The mechanism of mitral regurgitation is secondary due to left venrticular dysfunction.     Tricuspid Valve:  The tricuspid valve is structurally normal with normal leaflet excursion. There is trace tricuspid regurgitation. There is insufficient tricuspid regurgitation detected to calculate pulmonary artery systolic pressure.     Pulmonic Valve:  Structurally normal pulmonic valve with normal leaflet excursion. There is trace pulmonic regurgitation.     Aorta:  The aortic root appears normal in size.     Pericardium:  No pericardial effusion seen.     Systemic Veins:  The inferior vena cava is dilated measuring 2.47 cm in diameter, (dilated >2.1cm) with normal inspiratory collapse (normal >50%) consistent with mildly elevated right atrial pressure (~8, range 5-10mmHg).  ____________________________________________________________________  QUANTITATIVE DATA:  Left Ventricle Measurements: (Indexed to BSA)     IVSd (2D):   0.9 cm  LVPWd (2D):  1.0 cm  LVIDd (2D):  6.0 cm  LVIDs (2D):  5.6 cm  LV Mass:     226 g  133.3 g/m²  BiPlane LV EF%: 15 %     MV E Vmax:    1.04 m/s  MV A Vmax: 0.33 m/s  MV E/A:       3.15  e' lateral:   8.85 cm/s  e' medial:    4.88 cm/s  E/e' lateral: 11.75  E/e' medial:  21.31  E/e' Average: 15.15    Aorta Measurements: (Normal range) (Indexed to BSA)     Ao Root d     3.00 cm (3.1 - 3.7 cm) 1.77 cm/m²  Ao Asc d, 2D: 2.60            Left Atrium Measurements: (Indexed to BSA)  LA Diam 2D:        4.70 cm  LA Vol s, MOD A4C: 90.60 ml.  LA Vol s, MOD A2C: 98.00 ml.  LA Vol s, MOD BP:  94.60 ml  55.92 ml/m²         Right Ventricle Measurements: Right Atrial Measurements:     TAPSE:      2.2 cm            RA Vol s, MOD A4C         25.9 ml  RV S' Vmax: 14.60 cm/s        RA Vol s, MOD A4C i BSA   15.31 ml/m²                                RA Area s, MOD A4C        11.3 cm²  RA Area s, MOD A4C, i BSA 6.68 cm²/m²       LVOT / RVOT/ Qp/Qs Data: (Indexed to BSA)  LVOT Diameter,s: 1.80 cm  LVOT Area:       2.54 cm²  LVOT Vmax:       0.95 m/s  LVOT Vmn:        0.597 m/s  LVOT VTI:        13.00 cm  LVOT peak grad:  4 mmHg  LVOT mean grad:  1.5 mmHg  LVOT SV:         33.1 ml   19.56 ml/m²    Aortic Valve Measurements:  AV Vmax:          1.4 m/s  AV Peak Gradient: 8.4 mmHg  AR Vmax           3.87 m/s  AR PHT            215 msec  AR [place]          5.23 m/s²    Mitral Valve Measurements:     MV E Vmax: 1.0 m/s  MV A Vmax: 0.3 m/s  MV E/A:    3.2       Tricuspid Valve Measurements:     TV S'        14.6 cm/s  RA Pressure: 8 mmHg    ________________________________________________________________________________________  Electronically signed on 5/19/2025 at 3:55:29 PM by Madelaine Chen MD         < end of copied text >    < from: [facility] Duplex Lower Ext Vein Scan, [place] (05.18.25 @ 18:04) >    ACC: [id number] EXAM:  DUPLEX SCAN EXT VEINS LOWER BI   ORDERED BY: GUANACO BYRNE     PROCEDURE DATE:  05/18/2025          INTERPRETATION:  CLINICAL INFORMATION: History of CVA. Evaluate for DVT.    COMPARISON: None available.    TECHNIQUE: Duplex sonography of the BILATERAL LOWER extremity veins with   color and spectral Doppler, with and without compression.    FINDINGS:    RIGHT:  Normal compressibility of the RIGHT common femoral, femoral and popliteal   veins.  Doppler examination shows normal spontaneous and phasic flow.  No RIGHT calf vein thrombosis is detected.    LEFT:  Normal compressibility of the LEFT common femoral, femoral and popliteal   veins.  Doppler examination shows normal spontaneous and phasic flow.  Acute DVT identified within the left soleal vein. The posterior tibial   and peroneal venous segments are patent. Patency of the left   gastrocnemius vein is also noted.    IMPRESSION:    Acute DVT left soleal vein. No evidence for right lower extremity DVT.    Findingswere discussed with Dr. Joi Alcocer 5/18/2025 5:37PM    --- End of Report ---      < end of copied text >       PULMONARY CONSULT    HPI: 53 y/o M with PMH of HTN. Presents as a code stroke for L sided weakness. Pt was driving, pulled over due to chest pain and SOB. His neighbor called EMS after finding him on the floor with slurred speech and L sided weakness. Found to have R M1 occlusion s/p tenecteplase and mechanical thrombectomy. Also found to have severely decreased LVEF of 15% on echo w/ global LV hypokinesis, severe LV diastolic dysfunction, mild-mod MR. +LE DVT. CTA chest neg for PE, notes subtle groundglass opacities likely mild pulmonary edema. Course c/b fevers, tmax 101.2 F 5/20. Current smoker, pt reports 1 ppd x42 years. Denies hx of diagnosed lung disease, inhaler use. SOB now resolved. Denies CP, cough, sputum production.          PAST MEDICAL & SURGICAL HISTORY:  HTN (hypertension)  No significant past surgical history        Allergies    No Known Allergies    Intolerances      FAMILY HISTORY:  No pertinent family history in first degree relatives    Social history: current smoker     Review of Systems:  CONSTITUTIONAL: No fever, chills, or fatigue  EYES: No eye pain, visual disturbances, or discharge  ENMT:  No difficulty hearing, tinnitus, vertigo; No sinus or throat pain  NECK: No pain or stiffness  RESPIRATORY: Per above  CARDIOVASCULAR: No chest pain, palpitations, dizziness, or leg swelling  GASTROINTESTINAL: No abdominal or epigastric pain. No nausea, vomiting, or hematemesis; No diarrhea or constipation. No melena or hematochezia.  GENITOURINARY: No dysuria, frequency, hematuria, or incontinence  NEUROLOGICAL: per above  SKIN: No itching, burning, rashes, or lesions   MUSCULOSKELETAL: No joint pain or swelling; No muscle, back, or extremity pain  PSYCHIATRIC: No depression, anxiety, mood swings, or difficulty sleeping      Medications:  MEDICATIONS  (STANDING):  apixaban 5 milliGRAM(s) Oral two times a day  atorvastatin 80 milliGRAM(s) Oral at bedtime  dapagliflozin 10 milliGRAM(s) Oral daily  metoprolol succinate ER 25 milliGRAM(s) Oral daily  nicotine -  14 mG/24Hr(s) Patch 1 Patch Transdermal daily  piperacillin/tazobactam IVPB.. 3.375 Gram(s) IV Intermittent every 8 hours  polyethylene glycol 3350 17 Gram(s) Oral two times a day  senna 2 Tablet(s) Oral at bedtime  tamsulosin 0.4 milliGRAM(s) Oral at bedtime    MEDICATIONS  (PRN):  acetaminophen     Tablet .. 650 milliGRAM(s) Oral every 6 hours PRN Temp greater or equal to 38.5C (101.3F), Mild Pain (1 - 3)      Vital Signs Last 24 Hrs  T(C): 37.1 (21 May 2025 04:00), Max: 38.4 (20 May 2025 17:00)  T(F): 98.8 (21 May 2025 04:00), Max: 101.2 (20 May 2025 17:00)  HR: 82 (21 May 2025 08:00) (76 - 104)  BP: 116/77 (21 May 2025 08:00) (116/77 - 155/96)  BP(mean): 91 (21 May 2025 08:00) (91 - 118)  RR: 23 (21 May 2025 08:00) (17 - 26)  SpO2: 99% (21 May 2025 08:00) (96% - 100%)    Parameters below as of 21 May 2025 08:00  Patient On (Oxygen Delivery Method): room air        05-20 @ 07:01  -  05-21 @ 07:00  --------------------------------------------------------  IN: 1190 mL / OUT: 1950 mL / NET: -760 mL          LABS:                        13.8   5.86  )-----------( 240      ( 21 May 2025 05:47 )             40.9     05-21    138  |  104  |  13  ----------------------------<  90  4.0   |  19[L]  |  1.04    Ca    9.1      21 May 2025 05:47  Phos  3.1     05-20  Mg     2.0     05-20    TPro  6.6  /  Alb  3.7  /  TBili  0.6  /  DBili  x   /  AST  18  /  ALT  17  /  AlkPhos  80  05-20      Urinalysis with Rflx Culture (collected 05-20-25 @ 18:19)        CAPILLARY BLOOD GLUCOSE          Urinalysis Basic - ( 21 May 2025 05:47 )    Color: x / Appearance: x / SG: x / pH: x  Gluc: 90 mg/dL / Ketone: x  / Bili: x / Urobili: x   Blood: x / Protein: x / Nitrite: x   Leuk Esterase: x / RBC: x / WBC x   Sq Epi: x / Non Sq Epi: x / Bacteria: x                    CULTURES: (if applicable)                    Physical Examination:    General: No acute distress.      HEENT: Pupils equal, reactive to light.  Symmetric.    PULM: Clear to auscultation bilaterally, no significant sputum production    CVS: S1, S2    ABD: Soft, nondistended, nontender, normoactive bowel sounds, no masses    EXT: No edema, nontender    SKIN: Warm and well perfused, no rashes noted.    NEURO: Alert, oriented, interactive, nonfocal    RADIOLOGY REVIEWED      CT chest: < from: CT Angio Chest PE Protocol w/ IV Cont (05.19.25 @ 18:06) >    ACC: [id number] EXAM:  CT ABDOMEN AND PELVIS IC   ORDERED BY:  MARIAM QUINONES     ACC: [id number] EXAM:  CT ANGIO CHEST PULM ART WAWIC   ORDERED BY:  MARIAM QUINONES     PROCEDURE DATE:  05/19/2025          INTERPRETATION:  CLINICAL INFORMATION: Ischemic stroke with new DVT.   Evaluate for PE and malignancy    COMPARISON: None.    CONTRAST/COMPLICATIONS:  IV Contrast: Omnipaque 350  90 cc administered   10 cc discarded  Oral Contrast: NONE  .    PROCEDURE:  CT Angiography of the Chest was performed followed by portal venous phase   imaging of the Abdomen and Pelvis.  Sagittal and coronal reformats were performed as well as 3D (MIP)   reconstructions.    FINDINGS:  CHEST:  LUNGS AND LARGE AIRWAYS: Patent central airways. Subtle central   predominant groundglass opacities with scattered septal thickening.  PLEURA: No pleural effusion.  VESSELS: No pulmonary embolism.  HEART: Heart is enlarged. No pericardial effusion.  MEDIASTINUM AND JOHNNY: No lymphadenopathy.  CHEST WALL AND LOWER NECK: Within normal limits.    ABDOMEN AND PELVIS:  LIVER: Heterogeneous, predominantly hypodense lesion with peripheral   nodular enhancement in segment 6 measuring 1.6 x 0.7 cm (10, 55). Vague   indeterminate lesion in segment 7 measuring 1.1 x 1.2 cm (10:21) and   additional indeterminate lesion in segment 6 measuring 1.8 x 1.9 cm   (10:39). subcentimeter hypodensities too small to characterize.  BILE DUCTS: Normal caliber.  GALLBLADDER: Cholelithiasis.  SPLEEN: Within normal limits.  PANCREAS: Within normal limits.  ADRENALS: Within normal limits.  KIDNEYS/URETERS: Bilateral subcentimeter hypodensities, too small to   characterize. Symmetric renal enhancement. No hydronephrosis.    BLADDER: Within normal limits.  REPRODUCTIVE ORGANS: Enlarged prostate measuring 6 cm in transverse   diameter.    BOWEL: No bowel obstruction. Appendix is normal.  PERITONEUM/RETROPERITONEUM: Within normal limits.  VESSELS: Atherosclerotic changes.  LYMPH NODES: No lymphadenopathy.  ABDOMINAL WALL: Small amount of subcutaneous emphysema along the right   hip.  BONES: Degenerative changes.    IMPRESSION:  No pulmonary embolism.    Subtle central predominant groundglass opacities with septal thickening,   which are nonspecific but may be seen in setting of pulmonary edema.    Multiple liver lesions, likely a combination of cysts and hemangioma.   Additional indeterminate lesions in the right hepatic lobe, for further   evaluation with MRI with and without contrast may be obtained.      < end of copied text >      TTE:< from: TTE W or WO Ultrasound Enhancing Agent (05.19.25 @ 09:02) >    TRANSTHORACIC ECHOCARDIOGRAM REPORT  ________________________________________________________________________________                                      _______       Pt. Name:       TIFFANY SANCHEZ Study Date:    5/19/2025  MRN:            [id number]   YOB: 1970  Accession #:    [id number]    Age:           54 years  Account#:       [id number] Gender:        M  Heart Rate:     95 bpm       Height:        69.00 in (175.26 cm)  Rhythm:                      Weight:        125.00 lb(56.70 kg)  Blood Pressure: 156/99 mmHg  BSA/BMI:       1.69 m² / 18.46 kg/m²  ________________________________________________________________________________________  Referring Physician:    [id number] Nishi Hein  Interpreting Physician: Madelaine Chen MD  Primary Sonographer:    Nena Moffett [facility]    CPT:                ECHO TTE WITH CON COMP W DOPP - .m;DEFINITY ECHO                      CONTRAST PER ML - .m;DEFINITY ECHO CONTRAST PER ML                      WASTED - .m  Indication(s):      Abnormal electrocardiogram ECG EKG - R94.31  Procedure:          Transthoracic echocardiogram with 2-D, M-mode and complete                      spectral and color flow Doppler.  Ordering Location:  [facility]  Admission Status:   Inpatient  Contrast Injection: Verbal consent was obtained for injection of Ultrasonic                      Enhancing Agent following a discussion of risks and                      benefits.                      Endocardial visualization enhanced with 2 ml of Definity                      Ultrasound enhancing agent (Lot#:[id number] Exp.Date:2025-APR                      Discarded Dose:8ml).  UEA Reaction:       Patient had no adverse reaction after injection of                      Ultrasound Enhancing Agent.  Agitated Saline:    Injection with agitated saline was performed to evaluate for                      intracardiac shunting.  Study Information:  Image quality for this study is adequate.    _______________________________________________________________________________________     CONCLUSIONS:      1. Left ventricular cavity is mildly dilated. Left ventricular systolic function is severely decreased with an ejection fraction of 15 % by Greene's method of disks. Global left ventricular hypokinesis.   2. There is severe (grade 3) left ventricular diastolic dysfunction, with elevated left ventricular filling pressure.   3. There is increased LV mass and eccentric hypertrophy.   4. Normal right ventricular cavity size, with normal wall thickness,and reduced right ventricular systolic function.   5. Mild to moderate mitral regurgitation. Mechanism of mitral regurgitation: The mechanism of mitral regurgitation is secondary due to left venrticular dysfunction.   6. No pericardial effusion seen.   7. Agitated saline injection was negative for intracardiac shunt.   8. Pulmonary artery systolic pressure could not be estimated.   9. Findings were discussed with LUIS FERNANDO Pleitez on 5/19/2025.    ________________________________________________________________________________________  FINDINGS:     Left Ventricle:  After obtaining consent, Definity ultrasound enhancing agent was given for enhanced left ventricular opacification and improved delineation of the left ventricular endocardial borders. The left ventricular cavity is mildly dilated. Left ventricular systolic function is severely decreased with a calculated ejection fraction of 15 % by the Greene's biplane method of disks. There is global left ventricular hypokinesis. There is increased LV mass and eccentric hypertrophy. There is no evidence of a left ventricular thrombus. There is severe (grade 3) left ventricular diastolic dysfunction, with elevated left ventricular filling pressure.     Right Ventricle:  After obtaining consent, Definity intravenous contrast was given for enhanced right heart opacification and improved delineation of the endocardial borders. The right ventricular cavity is normal in size, with normal wall thickness and right ventricular systolic function is reduced. Tricuspid annular plane systolic excursion (TAPSE) is 2.2 cm (normal >=1.7 cm). Tricuspid annular tissue Doppler S' is 14.6 cm/s (normal >10 cm/s).     Left Atrium:  The left atrium is severely dilated with an indexed volume of 55.92 ml/m².     Right Atrium:  The right atrium is normal in size with an indexed volume of 15.31 ml/m² and an indexed area of 6.68 cm²/m².     Interatrial Septum:  The interatrial septum appears intact. Agitated saline injection was negative for intracardiac shunt.     Aortic Valve:  The aortic valve is tricuspid with normal leaflet excursion. There is no aortic valve stenosis. There is trace aortic regurgitation. AI VMax is 3.87 m/s. AI pressure half time is 215 msec. AI slope is 5.23 m/s².     Mitral Valve:  Structurally normal mitral valve with normal leaflet excursion. There is no mitral valve stenosis. There is mild to moderate mitral regurgitation. The mechanism of mitral regurgitation is secondary due to left venrticular dysfunction.     Tricuspid Valve:  The tricuspid valve is structurally normal with normal leaflet excursion. There is trace tricuspid regurgitation. There is insufficient tricuspid regurgitation detected to calculate pulmonary artery systolic pressure.     Pulmonic Valve:  Structurally normal pulmonic valve with normal leaflet excursion. There is trace pulmonic regurgitation.     Aorta:  The aortic root appears normal in size.     Pericardium:  No pericardial effusion seen.     Systemic Veins:  The inferior vena cava is dilated measuring 2.47 cm in diameter, (dilated >2.1cm) with normal inspiratory collapse (normal >50%) consistent with mildly elevated right atrial pressure (~8, range 5-10mmHg).  ____________________________________________________________________  QUANTITATIVE DATA:  Left Ventricle Measurements: (Indexed to BSA)     IVSd (2D):   0.9 cm  LVPWd (2D):  1.0 cm  LVIDd (2D):  6.0 cm  LVIDs (2D):  5.6 cm  LV Mass:     226 g  133.3 g/m²  BiPlane LV EF%: 15 %     MV E Vmax:    1.04 m/s  MV A Vmax: 0.33 m/s  MV E/A:       3.15  e' lateral:   8.85 cm/s  e' medial:    4.88 cm/s  E/e' lateral: 11.75  E/e' medial:  21.31  E/e' Average: 15.15    Aorta Measurements: (Normal range) (Indexed to BSA)     Ao Root d     3.00 cm (3.1 - 3.7 cm) 1.77 cm/m²  Ao Asc d, 2D: 2.60            Left Atrium Measurements: (Indexed to BSA)  LA Diam 2D:        4.70 cm  LA Vol s, MOD A4C: 90.60 ml.  LA Vol s, MOD A2C: 98.00 ml.  LA Vol s, MOD BP:  94.60 ml  55.92 ml/m²         Right Ventricle Measurements: Right Atrial Measurements:     TAPSE:      2.2 cm            RA Vol s, MOD A4C         25.9 ml  RV S' Vmax: 14.60 cm/s        RA Vol s, MOD A4C i BSA   15.31 ml/m²                                RA Area s, MOD A4C        11.3 cm²  RA Area s, MOD A4C, i BSA 6.68 cm²/m²       LVOT / RVOT/ Qp/Qs Data: (Indexed to BSA)  LVOT Diameter,s: 1.80 cm  LVOT Area:       2.54 cm²  LVOT Vmax:       0.95 m/s  LVOT Vmn:        0.597 m/s  LVOT VTI:        13.00 cm  LVOT peak grad:  4 mmHg  LVOT mean grad:  1.5 mmHg  LVOT SV:         33.1 ml   19.56 ml/m²    Aortic Valve Measurements:  AV Vmax:          1.4 m/s  AV Peak Gradient: 8.4 mmHg  AR Vmax           3.87 m/s  AR PHT            215 msec  AR [place]          5.23 m/s²    Mitral Valve Measurements:     MV E Vmax: 1.0 m/s  MV A Vmax: 0.3 m/s  MV E/A:    3.2       Tricuspid Valve Measurements:     TV S'        14.6 cm/s  RA Pressure: 8 mmHg    ________________________________________________________________________________________  Electronically signed on 5/19/2025 at 3:55:29 PM by Madelaine Chen MD         < end of copied text >    < from: VA Duplex Lower Ext Vein Scan, Bilat (05.18.25 @ 18:04) >    ACC: [id number] EXAM:  DUPLEX SCAN EXT VEINS LOWER BI   ORDERED BY: GUANACO BYRNE     PROCEDURE DATE:  05/18/2025          INTERPRETATION:  CLINICAL INFORMATION: History of CVA. Evaluate for DVT.    COMPARISON: None available.    TECHNIQUE: Duplex sonography of the BILATERAL LOWER extremity veins with   color and spectral Doppler, with and without compression.    FINDINGS:    RIGHT:  Normal compressibility of the RIGHT common femoral, femoral and popliteal   veins.  Doppler examination shows normal spontaneous and phasic flow.  No RIGHT calf vein thrombosis is detected.    LEFT:  Normal compressibility of the LEFT common femoral, femoral and popliteal   veins.  Doppler examination shows normal spontaneous and phasic flow.  Acute DVT identified within the left soleal vein. The posterior tibial   and peroneal venous segments are patent. Patency of the left   gastrocnemius vein is also noted.    IMPRESSION:    Acute DVT left soleal vein. No evidence for right lower extremity DVT.    Findingswere discussed with Dr. Joi Alcocer 5/18/2025 5:37PM    --- End of Report ---      < end of copied text >

## 2025-05-21 NOTE — CONSULT NOTE ADULT - SUBJECTIVE AND OBJECTIVE BOX
Patient is a 54y old  Male who presents with a chief complaint of Left m1 (21 May 2025 10:49)    HPI:  HPI: Patient TIFFANY SANCHEZ is a 54y (1970) RH man with a PMHx significant for HTN presenting as code stroke for Left sided weakness. Patient was driving, pulled over  due to chest pain and SOB. Neighbor called EMS after finding him on the floor with slurred speech and L sided weakness.Spoke with Wife Daiana, who saw him this morning at his baseline. He was complaining of SOB. Denies being on AC/AP. No hx stroke. Does not take any medications. At baseline, o x3, ambulates without assistance, independent of all Adls.    54yM was admitted on 05-18, patient seen today, son at bedside. Patient reports he is feeling better, left side feels weaker.       REVIEW OF SYSTEMS  Denies chest pain, SOB, N/V, F/C, abdominal pain     VITALS  T(C): 37 (05-21-25 @ 10:42), Max: 38.4 (05-20-25 @ 17:00)  HR: 89 (05-21-25 @ 12:00) (76 - 104)  BP: 125/87 (05-21-25 @ 12:00) (116/77 - 155/96)  RR: 13 (05-21-25 @ 12:00) (13 - 25)  SpO2: 96% (05-21-25 @ 12:00) (96% - 100%)  Wt(kg): --    PAST MEDICAL & SURGICAL HISTORY  HTN (hypertension)    No significant past surgical history        FUNCTIONAL HISTORY  Lives with wife, flight inside home, works for [employer]   Independent AMB and ADLs PTA     CURRENT FUNCTIONAL STATUS  PT  5/20  bed mobility min assist   transfers min assist x 2  gait CG/min assist  follows 75% simple commands     OT 5/19  bed mobility min assist   transfers min assist with RW   dressing min assist     RECENT LABS/IMAGING  CBC Full  -  ( 21 May 2025 05:47 )  WBC Count : 5.86 K/uL  RBC Count : 4.62 M/uL  Hemoglobin : 13.8 g/dL  Hematocrit : 40.9 %  Platelet Count - Automated : 240 K/uL  Mean Cell Volume : 88.5 fl  Mean Cell Hemoglobin : 29.9 pg  Mean Cell Hemoglobin Concentration : 33.7 g/dL  Auto Neutrophil # : x  Auto Lymphocyte # : x  Auto Monocyte # : x  Auto Eosinophil # : x  Auto Basophil # : x  Auto Neutrophil % : x  Auto Lymphocyte % : x  Auto Monocyte % : x  Auto Eosinophil % : x  Auto Basophil % : x    05-21    138  |  104  |  13  ----------------------------<  90  4.0   |  19[L]  |  1.04    Ca    9.1      21 May 2025 05:47  Phos  3.1     05-20  Mg     2.0     05-20    TPro  6.6  /  Alb  3.7  /  TBili  0.6  /  DBili  x   /  AST  18  /  ALT  17  /  AlkPhos  80  05-20    Urinalysis Basic - ( 21 May 2025 05:47 )    Color: x / Appearance: x / SG: x / pH: x  Gluc: 90 mg/dL / Ketone: x  / Bili: x / Urobili: x   Blood: x / Protein: x / Nitrite: x   Leuk Esterase: x / RBC: x / WBC x   Sq Epi: x / Non Sq Epi: x / Bacteria: x    < from: CT Head No Cont (05.19.25 @ 11:32) >    IMPRESSION:    Hypoattenuation in the right basal ganglia and right periinsular cortex,   compatible with acute infarct. No associated hemorrhage or mass effect.    Small chronic infarct in the right frontal white matter adjacent to the   right frontal horn is reidentified.      < end of copied text >    < from: MR Head No Cont (05.20.25 @ 15:02) >      IMPRESSION:    1.  Acute infarcts in the right basal ganglia/corona radiata and high   right paramedian frontal lobe.  2.  Questionable punctate acute infarct in the left cerebellar hemisphere.  3.  Chronic ischemic changes as discussed above.    --- End of Report ---    < end of copied text >      ALLERGIES  No Known Allergies      MEDICATIONS   acetaminophen     Tablet .. 650 milliGRAM(s) Oral every 6 hours PRN  albuterol/ipratropium for Nebulization 3 milliLiter(s) Nebulizer every 6 hours PRN  apixaban 5 milliGRAM(s) Oral two times a day  atorvastatin 80 milliGRAM(s) Oral at bedtime  dapagliflozin 10 milliGRAM(s) Oral daily  lactobacillus acidophilus 1 Tablet(s) Oral daily  metoprolol succinate ER 25 milliGRAM(s) Oral daily  nicotine -  14 mG/24Hr(s) Patch 1 Patch Transdermal daily  piperacillin/tazobactam IVPB.. 3.375 Gram(s) IV Intermittent every 8 hours  polyethylene glycol 3350 17 Gram(s) Oral two times a day  senna 2 Tablet(s) Oral at bedtime  tamsulosin 0.4 milliGRAM(s) Oral at bedtime      ----------------------------------------------------------------------------------------  PHYSICAL EXAM  Constitutional - NAD, Comfortable, in chair   Chest - Breathing comfortably on room air   Cardiovascular - S1S2   Extremities - No C/C/E, No calf tenderness   Neurologic Exam -    follows commands                 Cognitive - Awake, Alert, AAO to self, place, date, year, situation     Communication - Fluent, No dysarthria        Motor - LUE drift                     LEFT    UE - ShAB 5/5, EF 5/5, EE 5/5, WE 5/5,  5/5                    RIGHT UE - ShAB 5/5, EF 5/5, EE 5/5, WE 5/5,  5/5                    LEFT    LE - HF 5/5, KE 5/5, DF 5/5, PF 5/5                    RIGHT LE - HF 5/5, KE 5/5, DF 5/5, PF 5/5        Sensory - Intact to LT     Psychiatric - Mood stable, Affect WNL  ----------------------------------------------------------------------------------------  ASSESSMENT/PLAN  54yMale h/o TOB, HTN with functional deficits after CVA  s/p tenecteplase, thromobectomy  CT/MRI as above, acute infarcts in right basal ganglia, frontal lobe   fever, on antibiotics   EF 15%, cardiology consulted, outpatient work up  DVT LLE on eliquis   Pain - Tylenol  Rehab -    patient requires min assist with mobility    continue bedside therapy while admitted to prevent secondary complications of immobility, bed mobility, transfer training, progressive ambulation, equipment evaluation, ADLs   OOB throughout the day with staff, OOB to chair with meals/3 hours daily      Recommend ACUTE inpatient rehabilitation for the functional deficits consisting of 3 hours of multidisciplinary intense therapy/day x 5 days/week x 1-2 weeks depending on progress at rehabilitation facility, 24 hour RN/daily PMR physician for comorbid medical management.      Patient will be able to participate in and benefit from intense rehabilitation therapies for 3 hours a day x 5 days/week to maximize independence.     Rehab recommendations are dependent on functional progress and participation with bedside therapy     Will continue to follow for ongoing rehab needs and recommendations.       55 minutes spent, reviewing hospital course, therapy notes, relevant imaging, exam, education about inpatient rehabilitation, documentation, and discussion with  and rehabilitation team

## 2025-05-21 NOTE — SWALLOW BEDSIDE ASSESSMENT ADULT - MUCOSAL QUALITY
Detail Level: Generalized Continue Regimen: Tazarotene 0.1% topical cream Plan: Continue to apply tazarotene at home qhs\\nApply Tazarotene to affected area at bedtime, cover, and wash off in the morning.\\n\\nWill proceed with curettage, LN2 and candida to residual lesions\\n\\nRTC in 4 weeks WNL

## 2025-05-22 LAB
-  STAPHYLOCOCCUS EPIDERMIDIS: SIGNIFICANT CHANGE UP
ANION GAP SERPL CALC-SCNC: 16 MMOL/L — SIGNIFICANT CHANGE UP (ref 5–17)
BUN SERPL-MCNC: 13 MG/DL — SIGNIFICANT CHANGE UP (ref 7–23)
CALCIUM SERPL-MCNC: 8.9 MG/DL — SIGNIFICANT CHANGE UP (ref 8.4–10.5)
CHLORIDE SERPL-SCNC: 103 MMOL/L — SIGNIFICANT CHANGE UP (ref 96–108)
CO2 SERPL-SCNC: 19 MMOL/L — LOW (ref 22–31)
CREAT SERPL-MCNC: 1.13 MG/DL — SIGNIFICANT CHANGE UP (ref 0.5–1.3)
CULTURE RESULTS: ABNORMAL
DNA PLOIDY SPEC FC-IMP: SIGNIFICANT CHANGE UP
EGFR: 76 ML/MIN/1.73M2 — SIGNIFICANT CHANGE UP
EGFR: 76 ML/MIN/1.73M2 — SIGNIFICANT CHANGE UP
GAS PNL BLDV: SIGNIFICANT CHANGE UP
GLUCOSE SERPL-MCNC: 108 MG/DL — HIGH (ref 70–99)
GRAM STN FLD: ABNORMAL
HCT VFR BLD CALC: 42.2 % — SIGNIFICANT CHANGE UP (ref 39–50)
HGB BLD-MCNC: 14.3 G/DL — SIGNIFICANT CHANGE UP (ref 13–17)
MAGNESIUM SERPL-MCNC: 2.1 MG/DL — SIGNIFICANT CHANGE UP (ref 1.6–2.6)
MCHC RBC-ENTMCNC: 30.2 PG — SIGNIFICANT CHANGE UP (ref 27–34)
MCHC RBC-ENTMCNC: 33.9 G/DL — SIGNIFICANT CHANGE UP (ref 32–36)
MCV RBC AUTO: 89.2 FL — SIGNIFICANT CHANGE UP (ref 80–100)
METHOD TYPE: SIGNIFICANT CHANGE UP
NRBC BLD AUTO-RTO: 0 /100 WBCS — SIGNIFICANT CHANGE UP (ref 0–0)
ORGANISM # SPEC MICROSCOPIC CNT: ABNORMAL
ORGANISM # SPEC MICROSCOPIC CNT: ABNORMAL
PHOSPHATE SERPL-MCNC: 4.6 MG/DL — HIGH (ref 2.5–4.5)
PLATELET # BLD AUTO: 233 K/UL — SIGNIFICANT CHANGE UP (ref 150–400)
POTASSIUM SERPL-MCNC: 3.8 MMOL/L — SIGNIFICANT CHANGE UP (ref 3.5–5.3)
POTASSIUM SERPL-SCNC: 3.8 MMOL/L — SIGNIFICANT CHANGE UP (ref 3.5–5.3)
PTR INTERPRETATION: SIGNIFICANT CHANGE UP
RBC # BLD: 4.73 M/UL — SIGNIFICANT CHANGE UP (ref 4.2–5.8)
RBC # FLD: 13.7 % — SIGNIFICANT CHANGE UP (ref 10.3–14.5)
SODIUM SERPL-SCNC: 138 MMOL/L — SIGNIFICANT CHANGE UP (ref 135–145)
SPECIMEN SOURCE: SIGNIFICANT CHANGE UP
WBC # BLD: 2.41 K/UL — LOW (ref 3.8–10.5)
WBC # FLD AUTO: 2.41 K/UL — LOW (ref 3.8–10.5)

## 2025-05-22 PROCEDURE — 93458 L HRT ARTERY/VENTRICLE ANGIO: CPT | Mod: 26

## 2025-05-22 PROCEDURE — G0452: CPT | Mod: 26

## 2025-05-22 PROCEDURE — 74230 X-RAY XM SWLNG FUNCJ C+: CPT | Mod: 26

## 2025-05-22 PROCEDURE — 99152 MOD SED SAME PHYS/QHP 5/>YRS: CPT

## 2025-05-22 RX ORDER — APIXABAN 2.5 MG/1
5 TABLET, FILM COATED ORAL EVERY 12 HOURS
Refills: 0 | Status: DISCONTINUED | OUTPATIENT
Start: 2025-05-22 | End: 2025-05-23

## 2025-05-22 RX ADMIN — NICOTINE POLACRILEX 1 PATCH: 4 GUM, CHEWING ORAL at 19:44

## 2025-05-22 RX ADMIN — NICOTINE POLACRILEX 1 PATCH: 4 GUM, CHEWING ORAL at 06:16

## 2025-05-22 RX ADMIN — Medication 25 GRAM(S): at 21:14

## 2025-05-22 RX ADMIN — Medication 25 GRAM(S): at 05:04

## 2025-05-22 RX ADMIN — TAMSULOSIN HYDROCHLORIDE 0.4 MILLIGRAM(S): 0.4 CAPSULE ORAL at 21:14

## 2025-05-22 RX ADMIN — Medication 1 TABLET(S): at 13:47

## 2025-05-22 RX ADMIN — METOPROLOL SUCCINATE 25 MILLIGRAM(S): 50 TABLET, EXTENDED RELEASE ORAL at 05:03

## 2025-05-22 RX ADMIN — Medication 25 GRAM(S): at 13:46

## 2025-05-22 RX ADMIN — APIXABAN 5 MILLIGRAM(S): 2.5 TABLET, FILM COATED ORAL at 20:53

## 2025-05-22 RX ADMIN — NICOTINE POLACRILEX 1 PATCH: 4 GUM, CHEWING ORAL at 16:47

## 2025-05-22 RX ADMIN — DAPAGLIFLOZIN 10 MILLIGRAM(S): 5 TABLET, FILM COATED ORAL at 13:47

## 2025-05-22 RX ADMIN — ATORVASTATIN CALCIUM 80 MILLIGRAM(S): 80 TABLET, FILM COATED ORAL at 21:14

## 2025-05-22 NOTE — SWALLOW VFSS/MBS ASSESSMENT ADULT - ASPIRATION PRECAUTIONS
Monitor for s/s aspiration/laryngeal penetration. If noted:  D/C p.o. intake, provide non-oral nutrition/hydration/meds, and contact this service @ [phone]

## 2025-05-22 NOTE — SWALLOW VFSS/MBS ASSESSMENT ADULT - SLP GENERAL OBSERVATIONS
Pt encountered in radiology, secure in LUZ chair. Awake, alert, on room air. Fully cooperative with exam.

## 2025-05-22 NOTE — SWALLOW VFSS/MBS ASSESSMENT ADULT - DIAGNOSTIC IMPRESSIONS
Pt presents with a functional oropharyngeal swallow sequence. Adequate oral prep, mastication and bolus transfer. Trace premature spillage with thin liquids. No aspiration or laryngeal penetration. No pharyngeal residue. No indication for diet modification/ compensatory strategies.

## 2025-05-22 NOTE — PROGRESS NOTE ADULT - SUBJECTIVE AND OBJECTIVE BOX
Name of Patient : TIFFANY SANCHEZ  MRN: [id number]  Date of visit: 05-22-25 @ 14:46      Subjective: Patient seen and examined. No new events except as noted.   For [facility] [place] today     REVIEW OF SYSTEMS:    CONSTITUTIONAL: Generalized weakness   EYES/ENT: No visual changes;  No vertigo or throat pain   NECK: No pain or stiffness  RESPIRATORY: No cough, wheezing, hemoptysis; No shortness of breath  CARDIOVASCULAR: No chest pain or palpitations  GASTROINTESTINAL: No abdominal or epigastric pain. No nausea, vomiting, or hematemesis; No diarrhea or constipation. No melena or hematochezia.  GENITOURINARY: No dysuria, frequency or hematuria  NEUROLOGICAL: No numbness or weakness  SKIN: No itching, burning, rashes, or lesions   All other review of systems is negative unless indicated above.    MEDICATIONS:  MEDICATIONS  (STANDING):  apixaban 5 milliGRAM(s) Oral every 12 hours  atorvastatin 80 milliGRAM(s) Oral at bedtime  dapagliflozin 10 milliGRAM(s) Oral daily  lactobacillus acidophilus 1 Tablet(s) Oral daily  metoprolol succinate ER 25 milliGRAM(s) Oral daily  nicotine -  14 mG/24Hr(s) Patch 1 Patch Transdermal daily  piperacillin/tazobactam IVPB.. 3.375 Gram(s) IV Intermittent every 8 hours  polyethylene glycol 3350 17 Gram(s) Oral two times a day  senna 2 Tablet(s) Oral at bedtime  tamsulosin 0.4 milliGRAM(s) Oral at bedtime      PHYSICAL EXAM:  T(C): 36.7 (05-22-25 @ 12:00), Max: 37.1 (05-21-25 @ 16:00)  HR: 79 (05-22-25 @ 14:00) (66 - 99)  BP: 132/88 (05-22-25 @ 14:00) (108/68 - 148/86)  RR: 24 (05-22-25 @ 14:00) (15 - 26)  SpO2: 95% (05-22-25 @ 14:00) (94% - 100%)  Wt(kg): --  I&O's Summary    21 May 2025 07:01  -  22 May 2025 07:00  --------------------------------------------------------  IN: 480 mL / OUT: 200 mL / NET: 280 mL    22 May 2025 07:01  -  22 May 2025 14:46  --------------------------------------------------------  IN: 0 mL / OUT: 300 mL / NET: -300 mL          Appearance: Awake 	  HEENT:  Eyes are open   Lymphatic: No lymphadenopathy grossly   Cardiovascular: Normal S1 S2, no JVD  Respiratory: normal effort , clear  Gastrointestinal:  Soft, Non-tender  Skin: No rashes,  warm to touch  Psychiatry:  Mood & affect appropriate  Musculoskeletal: No edema         05-21-25 @ 07:01  -  05-22-25 @ 07:00  --------------------------------------------------------  IN: 480 mL / OUT: 200 mL / NET: 280 mL    05-22-25 @ 07:01  -  05-22-25 @ 14:46  --------------------------------------------------------  IN: 0 mL / OUT: 300 mL / NET: -300 mL                                  14.3   2.41  )-----------( 233      ( 22 May 2025 07:22 )             42.2               05-22    138  |  103  |  13  ----------------------------<  108[H]  3.8   |  19[L]  |  1.13    Ca    8.9      22 May 2025 07:22  Phos  4.6     05-22  Mg     2.1     05-22    TPro  6.6  /  Alb  3.7  /  TBili  0.6  /  DBili  x   /  AST  18  /  ALT  17  /  AlkPhos  80  05-20                       Urinalysis Basic - ( 22 May 2025 07:22 )    Color: x / Appearance: x / SG: x / pH: x  Gluc: 108 mg/dL / Ketone: x  / Bili: x / Urobili: x   Blood: x / Protein: x / Nitrite: x   Leuk Esterase: x / RBC: x / WBC x   Sq Epi: x / Non Sq Epi: x / Bacteria: x      Culture - Blood (05.20.25 @ 17:50)   Gram Stain:   Growth in aerobic bottle: Gram Positive Cocci in Clusters  - Staphylococcus epidermidis: Detec  Specimen Source: Blood Blood-Peripheral  Organism: Blood Culture PCR  Culture Results:   Growth in aerobic bottle: Gram Positive Cocci in Clusters   Direct identification is available within approximately 3-5   hours either by Blood Panel Multiplexed PCR or Direct   MALDI-TOF. Details: [email or web address]  Organism Identification: Blood Culture PCR  Method Type: PCR    Culture - Blood (05.20.25 @ 17:41)   Specimen Source: Blood Blood-Peripheral  Culture Results:   No growth at 24 hours    < from: MR Abdomen w/wo IV Cont (05.21.25 @ 10:24) >  IMPRESSION:  Normal liver morphology. No significant steatosis. A 1.6 cm segment 6   hemangioma and scattered subcentimeter cysts. No suspicious enhancing   lesions. 2 of the queried lesions on recent CT are not reproducible on   MRI.    < end of copied text >

## 2025-05-22 NOTE — PROGRESS NOTE ADULT - ASSESSMENT
55 YO RH M with PMHx of current smoker and HTN presented with left hemiparesis and slurred speech and found with R M1 occlusion with acute CVA and s/p TNK and thrombectomy. Admitted to neuro and internal medicine consulted for further management.     # L hemiparesis and slurred speech second to acute right CVA with R M1 occlusion   - CTH with right basal ganglia and right insular cortex acute infarct and chronic small infarct in right frontal area.   - CTA HEAD with right MCA M1 occlusion.   - CTA NECK with no disease   - PanCT with no PE   - MRI BRAIN with aute infarcts in the right basal ganglia/corona radiata and high right paramedian frontal lobe. Questionable punctate acute infarct in the left cerebellar hemisphere.  - s/p TNK 5/18   - s/p neuro IR TICI 3 recanalization 5/18   - Passed dysphagia screen   - TTE with no PFO, but noted with new HFrEF as below  - SS with no dysphagia  - Case discussed with neuro and given new HFrEF concern for possible embolic source   - Continue on lipitor and eliquis   - Pending hypercoagulable work up   - Fall, seizure and aspiration precautions   - Neuro checks per floor protocol   - Care per neuro     # SOB with pulm edema likely second to ADHF vs aspiration?   - CTA CHEST with no PE, however noted with pulmonary edema   - Currently on RA   - Monitor respiratory status  - Monitor volume status   - Supplemental O2 to keep SPO2 > 92    # SEVERE HFrEF 15 and biventricular dysfunction   - TTE with EF 15 with global hypokinesis, severe grade 3 LV ddfxn, LBH, mild to moderate MR, reduced RVSF.   - Continue on toprol 25 and farxiga  - Monitor volume status   - Monitor IO   - Keep O>I  - LHC today, F/u results  - GDMT as per Cardio   - Monitor on tele   - Cards rec outpt cardiac ischemic workup  - Cards appreciated     # LLE soleal DVT   - DOPPLER with acute LLE soleal DVT  - Continue on eliqius  - Pending RPT DOPPLER in 3-5 days    # Fever from aspiration vs DVT vs UTI?   - Fever of 101.2 on 5/20   - CTA CHEST with no PE, however noted with GGO more likely edema  - UA with trace leuks and pyuria, but negative bacteria   - MiniRVP negative   - MRSA PCR negative  - Given CVA with GGO on CT recc SS, however no dysphagia   - Fevers could be second to DVT ?   - Continue on zosyn for 5-7 day course  - 5/20 BCx1 of 2 +; Likely contaminant as Staph epi. Check repeat BCx   - Trend CBC, temp curve, VS and adjust as tolerated     # NAGMA   - HCO3 dropping  - Previous gas on admission with mild metabolic acidosis   - Monitor acidosis     # Liver lesions  - CT AP with multiple liver lesions, likely a combination of cysts and hemangioma. Additional indeterminate lesions in the right hepatic lobe.   - MRI with normal liver morphology. No significant steatosis. A 1.6 cm segment 6 hemangioma and scattered subcentimeter cysts. No suspicious enhancing lesions. 2 of the queried lesions on recent CT are not reproducible on MRI.  - LFTs normal   - Outpatient GI follow up     # Current smoker   - 1 PPD x 40 years   - Smoking cessation counselling   - Nicotine patch     # BPH   - Continue on flomax   - Monitor UOP     # DVT PPX with DOAC    Discussed with Attending and Neuro.

## 2025-05-22 NOTE — PROGRESS NOTE ADULT - ASSESSMENT
54y (1970) RH man with a PMHx significant for HTN presenting as code stroke for Left sided weakness. On exam, left arm and leg drift, left facial palsy. Spoke with wife bedside. CT CAP reviewed, will consult medicine. Cardiology consult for low EF. Will need to be on AC, first will get MRI B     Impression: Significant improvement Left hemiplegia, LHH, mild dysfluency with severe dysarthria, right gaze preference due to acute ischemic stroke, R M1 occlusion s/p tenecteplase and thrombectomy TICI 3 Mechanism embolic due to low EF    NEURO: Continue close monitoring for neurologic deterioration, MRI Brain w/o result above, stable, no hemorrhage  HgbA1C 5.6, LDL 96    ANTITHROMBOTIC THERAPY: asa 81 mg daily stopped, started apixaban 5mg BID for low EF.     PULMONARY: CXR clear, protecting airway, saturating well. CT CAP shows possible pulmonary edema, pulmonology consulted. No signs of respiratory distress and patient is saturating well above 95% on pulse ox    CARDIOVASCULAR:  TTE shows EF 15%, pt planned for cardiac cath today 5/22, pt on metoprolol and farxiga as part of GDMT. No need for JACEK. MR brain stable, started eliquis 5mg bid                       SBP goal: <180/105    GASTROINTESTINAL: passed dysphagia screen , CT AP shows Multiple liver lesions, medicine consulted. GI consulted, recommended MR brain w/w/o (results pending)     Diet: reg, NPO since midnight for cath today    RENAL: BUN/Cr within normal limits, good urine output      Na Goal: Greater than 135     Pizano: No    HEMATOLOGY: H/H without change, Platelets normal. L soleal DVT, appreciate Vascular recommendations. Repeat LE dopplers in 3-5 days.    Hypercoagulable panel sent      DVT ppx: eliquis 5mg BID    ID: febrile 5/20 evening, infectious workup sent. Empiric abx started, no leukocytosis. Blood cultures from 5/20 with positive gram cocci in clusters in aerobic bottle    OTHER: Plan discussed with patient and family at bedside, all questions and concerns addressed. Smoking cessation education provided. Provided patient nicotine patch, and patient accepted.    DISPOSITION: Rehab or home depending on PT eval once stable and workup is complete    Case & Plan discussed with patient and family. All questions answered.    CORE MEASURES:         Admission NIHSS: 17     Tenecteplase: [x] YES [] NO     Statin Therapy: [x] YES [] NO     Smoking [x] YES [] NO     Afib [] YES [x] NO     Stroke Education [x] YES [] NO    Dysphagia screen : [x] Passed [] Failed- S&S pending [] Pending    DVT ppx: pt on eliquis         54y (1970) RH man with a PMHx significant for HTN presenting as code stroke for Left sided weakness. On exam, left arm and leg drift, left facial palsy. Spoke with wife bedside. CT CAP reviewed, will consult medicine. Cardiology consult for low EF. Will need to be on AC, first will get MRI B     Impression: Significant improvement Left hemiplegia, LHH, mild dysfluency with severe dysarthria, right gaze preference due to acute ischemic stroke, R M1 occlusion s/p tenecteplase and thrombectomy TICI 3 Mechanism embolic due to low EF    NEURO: Continue close monitoring for neurologic deterioration, MRI Brain w/o result above, stable, no hemorrhage  HgbA1C 5.6, LDL 96    ANTITHROMBOTIC THERAPY: asa 81 mg daily stopped, started apixaban 5mg BID for low EF.     PULMONARY: CXR clear, protecting airway, saturating well. CT CAP shows possible pulmonary edema, pulmonology consulted. No signs of respiratory distress and patient is saturating well above 95% on pulse ox    CARDIOVASCULAR:  TTE shows EF 15%, pt planned for cardiac cath today 5/22, pt on metoprolol and farxiga as part of GDMT. No need for JACEK. MR brain stable, started eliquis 5mg bid                       SBP goal: <180/105    GASTROINTESTINAL: passed dysphagia screen , CT AP shows Multiple liver lesions, medicine consulted. GI consulted, recommended MR brain w/w/o (results pending)     Diet: reg, NPO since midnight for cath today    RENAL: BUN/Cr within normal limits, good urine output      Na Goal: Greater than 135     Pizano: No    HEMATOLOGY: H/H without change, Platelets normal. L soleal DVT (likely present on admission), appreciate Vascular recommendations. Repeat LE dopplers in 3-5 days.    Hypercoagulable panel sent      DVT ppx: eliquis 5mg BID    ID: febrile 5/20 evening, infectious workup sent. Empiric abx started, no leukocytosis. Blood cultures from 5/20 with positive gram cocci in clusters in aerobic bottle    OTHER: Plan discussed with patient and family at bedside, all questions and concerns addressed. Smoking cessation education provided. Provided patient nicotine patch, and patient accepted.    DISPOSITION: Rehab or home depending on PT eval once stable and workup is complete    Case & Plan discussed with patient and family. All questions answered.    CORE MEASURES:         Admission NIHSS: 17     Tenecteplase: [x] YES [] NO     Statin Therapy: [x] YES [] NO     Smoking [x] YES [] NO     Afib [] YES [x] NO     Stroke Education [x] YES [] NO    Dysphagia screen : [x] Passed [] Failed- S&S pending [] Pending    DVT ppx: pt on eliquis         54y (1970) RH man with a PMHx significant for HTN presenting as code stroke for Left sided weakness. On exam, left arm and leg drift, left facial palsy. Spoke with wife bedside. CT CAP reviewed, will consult medicine. Cardiology consult for low EF. Will need to be on AC, first will get MRI B     Impression: Significant improvement Left hemiplegia, LHH, mild dysfluency with severe dysarthria, right gaze preference due to acute ischemic stroke, R M1 occlusion s/p tenecteplase and thrombectomy TICI 3 Mechanism embolic due to low EF    NEURO: Continue close monitoring for neurologic deterioration, MRI Brain w/o result above, stable, no hemorrhage  HgbA1C 5.6, LDL 96    ANTITHROMBOTIC THERAPY: asa 81 mg daily stopped, started apixaban 5mg BID for low EF. Apixiban held 5/21 evening for Cardiac cath 5/22    PULMONARY: CXR clear, protecting airway, saturating well. CT CAP shows possible pulmonary edema, pulmonology consulted. No signs of respiratory distress and patient is saturating well above 95% on pulse ox    CARDIOVASCULAR:  TTE shows EF 15%, pt planned for cardiac cath today 5/22, pt on metoprolol and farxiga as part of GDMT. No need for JACEK. MR brain stable, started eliquis 5mg bid                       SBP goal: <180/105    GASTROINTESTINAL: passed dysphagia screen , CT AP shows Multiple liver lesions, medicine consulted. GI consulted, recommended MR abdomen w/w/o (results as above).   MBS today to eval for dysphagia: results per note: No aspiration or laryngeal penetration. No pharyngeal residue. No indication for diet modification/ compensatory strategies.     Diet: reg, NPO since midnight for cath today    RENAL: BUN/Cr within normal limits, good urine output      Na Goal: Greater than 135     Pizano: No    HEMATOLOGY: H/H without change, Platelets normal. L soleal DVT (likely present on admission), appreciate Vascular recommendations. Repeat LE dopplers in 3-5 days.    Hypercoagulable panel sent      DVT ppx: eliquis 5mg BID    ID: febrile 5/20 evening, infectious workup sent. Empiric abx started will continue course of zosyn, no leukocytosis. Blood cultures from 5/20 with positive gram cocci in clusters in aerobic bottle, repeat cultures ordered. Neutropenia, medicine following.     OTHER: Plan discussed with patient and family at bedside, all questions and concerns addressed. Smoking cessation education provided. Provided patient nicotine patch, and patient accepted.    DISPOSITION: Acute Rehab once stable for discharge    Case & Plan discussed with patient and family. All questions answered.    CORE MEASURES:         Admission NIHSS: 17     Tenecteplase: [x] YES [] NO     Statin Therapy: [x] YES [] NO     Smoking [x] YES [] NO     Afib [] YES [x] NO     Stroke Education [x] YES [] NO    Dysphagia screen : [x] Passed [] Failed- S&S pending [] Pending    DVT ppx: pt on eliquis

## 2025-05-22 NOTE — PROGRESS NOTE ADULT - SUBJECTIVE AND OBJECTIVE BOX
Subjective: Patient seen and examined. No new events except as noted.   Remains in stroke unit  s/p [facility]  feels ok    REVIEW OF SYSTEMS:    CONSTITUTIONAL: +weakness, fevers or chills  EYES/ENT: No visual changes;  No vertigo or throat pain   NECK: No pain or stiffness  RESPIRATORY: No cough, wheezing, hemoptysis; No shortness of breath  CARDIOVASCULAR: No chest pain or palpitations  GASTROINTESTINAL: No abdominal or epigastric pain. No nausea, vomiting, or hematemesis; No diarrhea or constipation. No melena or hematochezia.  GENITOURINARY: No dysuria, frequency or hematuria  NEUROLOGICAL: No numbness or weakness  SKIN: No itching, burning, rashes, or lesions   All other review of systems is negative unless indicated above.    MEDICATIONS:  MEDICATIONS  (STANDING):  apixaban 5 milliGRAM(s) Oral every 12 hours  atorvastatin 80 milliGRAM(s) Oral at bedtime  dapagliflozin 10 milliGRAM(s) Oral daily  lactobacillus acidophilus 1 Tablet(s) Oral daily  metoprolol succinate ER 25 milliGRAM(s) Oral daily  nicotine -  14 mG/24Hr(s) Patch 1 Patch Transdermal daily  piperacillin/tazobactam IVPB.. 3.375 Gram(s) IV Intermittent every 8 hours  polyethylene glycol 3350 17 Gram(s) Oral two times a day  senna 2 Tablet(s) Oral at bedtime  tamsulosin 0.4 milliGRAM(s) Oral at bedtime      PHYSICAL EXAM:  T(C): 36.7 (05-22-25 @ 12:00), Max: 37.1 (05-21-25 @ 16:00)  HR: 75 (05-22-25 @ 13:30) (66 - 99)  BP: 125/70 (05-22-25 @ 13:30) (108/68 - 150/91)  RR: 15 (05-22-25 @ 13:30) (15 - 26)  SpO2: 98% (05-22-25 @ 13:30) (94% - 100%)  Wt(kg): --  I&O's Summary    21 May 2025 07:01  -  22 May 2025 07:00  --------------------------------------------------------  IN: 480 mL / OUT: 200 mL / NET: 280 mL    22 May 2025 07:01  -  22 May 2025 13:58  --------------------------------------------------------  IN: 0 mL / OUT: 300 mL / NET: -300 mL          Appearance: Normal	  HEENT:   Normal oral mucosa, PERRL, EOMI	  Lymphatic: No lymphadenopathy  Cardiovascular: Normal S1 S2, No JVD, No murmurs, No edema  Respiratory: Lungs clear to auscultation	  Psychiatry: A & O x 3, Mood & affect appropriate  Gastrointestinal:  Soft, Non-tender, + BS	  Skin: No rashes, No ecchymoses, No cyanosis	  Neurologic Exam:  Mental status - Awake, Alert, Oriented to person, place, and time. Speech fluent, repetition and naming intact. Follows commands    Cranial nerves - VFF, EOMI, Left facial palsy, hearing intact b/l    Motor -   Strength testing   Drift LUE and LLE extremities   RUE and RLE no drift     Coordination - No tremors appreciated    Gait and station - Deffered    Extremities: Normal range of motion, No clubbing, cyanosis or edema  Vascular: Peripheral pulses palpable 2+ bilaterally    LABS:    CARDIAC MARKERS:                                14.3   2.41  )-----------( 233      ( 22 May 2025 07:22 )             42.2     05-22    138  |  103  |  13  ----------------------------<  108[H]  3.8   |  19[L]  |  1.13    Ca    8.9      22 May 2025 07:22  Phos  4.6     05-22  Mg     2.1     05-22    TPro  6.6  /  Alb  3.7  /  TBili  0.6  /  DBili  x   /  AST  18  /  ALT  17  /  AlkPhos  80  05-20    proBNP:   Lipid Profile:   HgA1c:   TSH:     Trace          TELEMETRY: 	SR    ECG:  	  RADIOLOGY:   DIAGNOSTIC TESTING:  [ ] Echocardiogram:  [ ]  Catheterization:  [ ] Stress Test:    OTHER:

## 2025-05-22 NOTE — PROGRESS NOTE ADULT - SUBJECTIVE AND OBJECTIVE BOX
THE PATIENT WAS SEEN AND EXAMINED BY ME WITH THE HOUSESTAFF AND STROKE TEAM DURING MORNING ROUNDS.   HPI:   Patient TIFFANY SANCHEZ is a 54y (1970) RH man with a PMHx significant for HTN presenting as code stroke for Left sided weakness. Patient was driving, pulled over  due to chest pain and SOB. Neighbor called EMS after finding him on the floor with slurred speech and L sided weakness. Spoke with Wife Daiana, who saw him this morning at his baseline. He was complaining of SOB. Denies being on AC/AP. No hx stroke. Does not take any medications. At baseline, o x3, ambulates without assistance, independent of all Adls.      SUBJECTIVE: afebrile overnight       MEDICATIONS  (STANDING):  atorvastatin 80 milliGRAM(s) Oral at bedtime  dapagliflozin 10 milliGRAM(s) Oral daily  lactobacillus acidophilus 1 Tablet(s) Oral daily  metoprolol succinate ER 25 milliGRAM(s) Oral daily  nicotine -  14 mG/24Hr(s) Patch 1 Patch Transdermal daily  piperacillin/tazobactam IVPB.. 3.375 Gram(s) IV Intermittent every 8 hours  polyethylene glycol 3350 17 Gram(s) Oral two times a day  senna 2 Tablet(s) Oral at bedtime  tamsulosin 0.4 milliGRAM(s) Oral at bedtime    MEDICATIONS  (PRN):  acetaminophen     Tablet .. 650 milliGRAM(s) Oral every 6 hours PRN Temp greater or equal to 38.5C (101.3F), Mild Pain (1 - 3)  albuterol/ipratropium for Nebulization 3 milliLiter(s) Nebulizer every 6 hours PRN Shortness of Breath and/or Wheezing        PHYSICAL EXAM:   Vital Signs Last 24 Hrs  T(C): 36.9 (22 May 2025 04:00), Max: 37.1 (21 May 2025 16:00)  T(F): 98.4 (22 May 2025 04:00), Max: 98.8 (21 May 2025 16:00)  HR: 82 (22 May 2025 06:00) (76 - 114)  BP: 116/82 (22 May 2025 06:00) (108/68 - 150/91)  BP(mean): 95 (22 May 2025 06:00) (82 - 114)  RR: 18 (22 May 2025 06:00) (13 - 26)  SpO2: 100% (22 May 2025 06:00) (96% - 100%)    Parameters below as of 22 May 2025 06:00  Patient On (Oxygen Delivery Method): room air            General: No acute distress  HEENT: EOM intact, visual fields full  Abdomen: Soft, nontender, nondistended   Extremities: No edema    NEUROLOGICAL EXAM:  Mental status: Awake, alert, oriented x3, sitting in chair, no aphasia, no neglect, normal memory   Cranial Nerves: left facial palsy, no nystagmus, no dysarthria,  tongue midline  Motor exam: Normal tone, Drift LUE and LLE extremities, moves right side spontaneously  Sensation: Intact to light touch   Coordination/ Gait: No dysmetria, MADELEINE intact and symmetric bilaterally    LABS:                        13.8   5.86  )-----------( 240      ( 21 May 2025 05:47 )             40.9    05-21    138  |  104  |  13  ----------------------------<  90  4.0   |  19[L]  |  1.04    Ca    9.1      21 May 2025 05:47  Phos  3.1     05-20  Mg     2.0     05-20    TPro  6.6  /  Alb  3.7  /  TBili  0.6  /  DBili  x   /  AST  18  /  ALT  17  /  AlkPhos  80  05-20        IMAGING: Reviewed by me.      5/18 CT Brain stroke Protocol    No acute intracranial hemorrhage, mass effect, or midline shift.    Age-indeterminate infarct in the right frontal white matter.    Please see subsequent CTA regarding right MCA occlusion.    Findings were discussed with Dr. Gerry Miller  5/18/2025 10:55 AM by Dr. Purcell with read back confirmation.    5/18 CT Angio Brain Stroke Protocol    CT angiogram neck:  -No vaso-occlusive disease.    CT angiogram head:  -Right MCA M1 occlusion. Relative paucity of flow related enhancement in   the distal right MCA branches. Findings were discussed with Dr. Joe Miller 5/18/2025 10:55 AM by Dr. Purcell with read back confirmation.    CT perfusion:  -144 mL at risk ischemic tissue involving the right MCA territory   diffusely.  -0 mL core infarct identified.  -Mismatch ratio: Infinite.  -Mismatch volume: 144 mL.    5/18 Chest xray  Endotracheal tube with tip overlying the upper to mid trachea.  No focal consolidation.    5/18 Venous Duplex Lower extremities    Acute DVT left soleal vein. No evidence for right lower extremity DVT.    5/18 IR Neuro report     Right M1 occlusion status post TICI 3 recanalization    5/19 CT CAP with IV contrast    No pulmonary embolism.    Subtle central predominant groundglass opacities with septal thickening,   which are nonspecific but may be seen in setting of pulmonary edema.    Multiple liver lesions, likely a combination of cysts and hemangioma.   Additional indeterminate lesions in the right hepatic lobe, for further   evaluation with MRI with and without contrast may be obtained.    5/19 CT Angio Chest PE Protocol w/ IV Cont    IMPRESSION:  No pulmonary embolism.    Subtle central predominant groundglass opacities with septal thickening,   which are nonspecific but may be seen in setting of pulmonary edema.    Multiple liver lesions, likely a combination of cysts and hemangioma.   Additional indeterminate lesions in the right hepatic lobe, for further   evaluation with MRI with and without contrast may be obtained.       5/19 CT Head No Cont    IMPRESSION:    Hypoattenuation in the right basal ganglia and right periinsular cortex,   compatible with acute infarct. No associated hemorrhage or mass effect.    Small chronic infarct in the right frontal white matter adjacent to the   right frontal horn is reidentified.    5/19 TTE     1. Left ventricular cavity is mildly dilated. Left ventricular systolic function is severely decreased with an ejection fraction of 15 % by Greene's method of disks. Global left ventricular hypokinesis.   2. There is severe (grade 3) left ventricular diastolic dysfunction, with elevated left ventricular filling pressure.   3. There is increased LV mass and eccentric hypertrophy.   4. Normal right ventricular cavity size, with normal wall thickness, and reduced right ventricular systolic function.   5. Mild to moderate mitral regurgitation. Mechanism of mitral regurgitation: The mechanism of mitral regurgitation is secondary due to left venrticular dysfunction.   6. No pericardial effusion seen.   7. Agitated saline injection was negative for intracardiac shunt.   8. Pulmonary artery systolic pressure could not be estimated.   9. Findings were discussed with LUIS FERNANDO Pleitez on 5/19/2025.    5/20 MRI Head No contrast  1.  Acute infarcts in the right basal ganglia/corona radiata and high   right paramedian frontal lobe.  2.  Questionable punctate acute infarct in the left cerebellar hemisphere.  3.  Chronic ischemic changes as discussed above.    5/21 Chest Xray    No evidence of acute cardiopulmonary disease.    5/21 MRI abdomen w/ wo    Normal liver morphology. No significant steatosis. A 1.6 cm segment 6   hemangioma and scattered subcentimeter cysts. No suspicious enhancing   lesions. 2 of the queried lesions on recent CT are not reproducible on   MRI.                       THE PATIENT WAS SEEN AND EXAMINED BY ME WITH THE HOUSESTAFF AND STROKE TEAM DURING ROUNDS upon return from Cardiac Cath.   HPI:   Patient TIFFANY SANCHEZ is a 54y (1970) RH man with a PMHx significant for HTN presenting as code stroke for Left sided weakness. Patient was driving, pulled over  due to chest pain and SOB. Neighbor called EMS after finding him on the floor with slurred speech and L sided weakness. Spoke with Wife Daiana, who saw him this morning at his baseline. He was complaining of SOB. Denies being on AC/AP. No hx stroke. Does not take any medications. At baseline, o x3, ambulates without assistance, independent of all Adls.      SUBJECTIVE: afebrile overnight       MEDICATIONS  (STANDING):  atorvastatin 80 milliGRAM(s) Oral at bedtime  dapagliflozin 10 milliGRAM(s) Oral daily  lactobacillus acidophilus 1 Tablet(s) Oral daily  metoprolol succinate ER 25 milliGRAM(s) Oral daily  nicotine -  14 mG/24Hr(s) Patch 1 Patch Transdermal daily  piperacillin/tazobactam IVPB.. 3.375 Gram(s) IV Intermittent every 8 hours  polyethylene glycol 3350 17 Gram(s) Oral two times a day  senna 2 Tablet(s) Oral at bedtime  tamsulosin 0.4 milliGRAM(s) Oral at bedtime    MEDICATIONS  (PRN):  acetaminophen     Tablet .. 650 milliGRAM(s) Oral every 6 hours PRN Temp greater or equal to 38.5C (101.3F), Mild Pain (1 - 3)  albuterol/ipratropium for Nebulization 3 milliLiter(s) Nebulizer every 6 hours PRN Shortness of Breath and/or Wheezing        PHYSICAL EXAM:   Vital Signs Last 24 Hrs  T(C): 36.9 (22 May 2025 04:00), Max: 37.1 (21 May 2025 16:00)  T(F): 98.4 (22 May 2025 04:00), Max: 98.8 (21 May 2025 16:00)  HR: 82 (22 May 2025 06:00) (76 - 114)  BP: 116/82 (22 May 2025 06:00) (108/68 - 150/91)  BP(mean): 95 (22 May 2025 06:00) (82 - 114)  RR: 18 (22 May 2025 06:00) (13 - 26)  SpO2: 100% (22 May 2025 06:00) (96% - 100%)    Parameters below as of 22 May 2025 06:00  Patient On (Oxygen Delivery Method): room air            General: No acute distress  HEENT: EOM intact, visual fields full  Abdomen: Soft, nontender, nondistended   Extremities: No edema    NEUROLOGICAL EXAM:  Mental status: Awake, alert, oriented x3, sitting in chair, no aphasia, no neglect, normal memory   Cranial Nerves: left facial palsy, no nystagmus, no dysarthria,  tongue midline  Motor exam: Normal tone, Drift LUE and LLE extremities, moves right side spontaneously  Sensation: Intact to light touch   Coordination/ Gait: No dysmetria, MADELEINE intact and symmetric bilaterally                          14.3   2.41  )-----------( 233      ( 22 May 2025 07:22 )             42.2     05-22    138  |  103  |  13  ----------------------------<  108[H]  3.8   |  19[L]  |  1.13    Ca    8.9      22 May 2025 07:22  Phos  4.6     05-22  Mg     2.1     05-22    TPro  6.6  /  Alb  3.7  /  TBili  0.6  /  DBili  x   /  AST  18  /  ALT  17  /  AlkPhos  80  05-20    CAPILLARY BLOOD GLUCOSE          Urinalysis Basic - ( 22 May 2025 07:22 )    Color: x / Appearance: x / SG: x / pH: x  Gluc: 108 mg/dL / Ketone: x  / Bili: x / Urobili: x   Blood: x / Protein: x / Nitrite: x   Leuk Esterase: x / RBC: x / WBC x   Sq Epi: x / Non Sq Epi: x / Bacteria: x          22 May 2025 07:01  -  22 May 2025 13:10  --------------------------------------------------------  IN: 0 mL / OUT: 300 mL / NET: -300 mL      IMAGING: Reviewed by me.      5/18 CT Brain stroke Protocol    No acute intracranial hemorrhage, mass effect, or midline shift.    Age-indeterminate infarct in the right frontal white matter.    Please see subsequent CTA regarding right MCA occlusion.    Findings were discussed with Dr. Gerry Miller  5/18/2025 10:55 AM by Dr. Purcell with read back confirmation.    5/18 CT Angio Brain Stroke Protocol    CT angiogram neck:  -No vaso-occlusive disease.    CT angiogram head:  -Right MCA M1 occlusion. Relative paucity of flow related enhancement in   the distal right MCA branches. Findings were discussed with Dr. Joe Miller 5/18/2025 10:55 AM by Dr. Purcell with read back confirmation.    CT perfusion:  -144 mL at risk ischemic tissue involving the right MCA territory   diffusely.  -0 mL core infarct identified.  -Mismatch ratio: Infinite.  -Mismatch volume: 144 mL.    5/18 Chest xray  Endotracheal tube with tip overlying the upper to mid trachea.  No focal consolidation.    5/18 Venous Duplex Lower extremities    Acute DVT left soleal vein. No evidence for right lower extremity DVT.    5/18 IR Neuro report     Right M1 occlusion status post TICI 3 recanalization    5/19 CT CAP with IV contrast    No pulmonary embolism.    Subtle central predominant groundglass opacities with septal thickening,   which are nonspecific but may be seen in setting of pulmonary edema.    Multiple liver lesions, likely a combination of cysts and hemangioma.   Additional indeterminate lesions in the right hepatic lobe, for further   evaluation with MRI with and without contrast may be obtained.    5/19 CT Angio Chest PE Protocol w/ IV Cont    IMPRESSION:  No pulmonary embolism.    Subtle central predominant groundglass opacities with septal thickening,   which are nonspecific but may be seen in setting of pulmonary edema.    Multiple liver lesions, likely a combination of cysts and hemangioma.   Additional indeterminate lesions in the right hepatic lobe, for further   evaluation with MRI with and without contrast may be obtained.       5/19 CT Head No Cont    IMPRESSION:    Hypoattenuation in the right basal ganglia and right periinsular cortex,   compatible with acute infarct. No associated hemorrhage or mass effect.    Small chronic infarct in the right frontal white matter adjacent to the   right frontal horn is reidentified.    5/19 TTE     1. Left ventricular cavity is mildly dilated. Left ventricular systolic function is severely decreased with an ejection fraction of 15 % by Greene's method of disks. Global left ventricular hypokinesis.   2. There is severe (grade 3) left ventricular diastolic dysfunction, with elevated left ventricular filling pressure.   3. There is increased LV mass and eccentric hypertrophy.   4. Normal right ventricular cavity size, with normal wall thickness, and reduced right ventricular systolic function.   5. Mild to moderate mitral regurgitation. Mechanism of mitral regurgitation: The mechanism of mitral regurgitation is secondary due to left venrticular dysfunction.   6. No pericardial effusion seen.   7. Agitated saline injection was negative for intracardiac shunt.   8. Pulmonary artery systolic pressure could not be estimated.   9. Findings were discussed with LUIS FERNANDO Pleitez on 5/19/2025.    5/20 MRI Head No contrast  1.  Acute infarcts in the right basal ganglia/corona radiata and high   right paramedian frontal lobe.  2.  Questionable punctate acute infarct in the left cerebellar hemisphere.  3.  Chronic ischemic changes as discussed above.    5/21 Chest Xray    No evidence of acute cardiopulmonary disease.    5/21 MRI abdomen w/ wo    Normal liver morphology. No significant steatosis. A 1.6 cm segment 6   hemangioma and scattered subcentimeter cysts. No suspicious enhancing   lesions. 2 of the queried lesions on recent CT are not reproducible on   MRI.

## 2025-05-22 NOTE — PROGRESS NOTE ADULT - NS ATTEND AMEND GEN_ALL_CORE FT
Patient seen and examined by me. patient care and plan discussed and reviewed with PA. Plan as outlined above edited by me to reflect our discussion.
Patient seen and examined by me. patient care and plan discussed and reviewed with PA. Plan as outlined above edited by me to reflect our discussion. Advanced care planning/advanced directives discussed with patient/family. DNR status including forceful chest compressions to attempt to restart the heart, ventilator support/artificial breathing, electric shock, artificial nutrition, health care proxy, Molst form all discussed with pt. More than 50% of the visit was spent counseling and/or coordinating care by the attending physician. Sixteen minutes spent on discussing advanced directives.

## 2025-05-23 ENCOUNTER — TRANSCRIPTION ENCOUNTER (OUTPATIENT)
Age: 55
End: 2025-05-23

## 2025-05-23 VITALS
RESPIRATION RATE: 18 BRPM | HEART RATE: 79 BPM | SYSTOLIC BLOOD PRESSURE: 123 MMHG | DIASTOLIC BLOOD PRESSURE: 89 MMHG | OXYGEN SATURATION: 100 %

## 2025-05-23 LAB
ANION GAP SERPL CALC-SCNC: 15 MMOL/L — SIGNIFICANT CHANGE UP (ref 5–17)
APCR PPP: 2.62 RATIO — SIGNIFICANT CHANGE UP
BUN SERPL-MCNC: 13 MG/DL — SIGNIFICANT CHANGE UP (ref 7–23)
CALCIUM SERPL-MCNC: 8.8 MG/DL — SIGNIFICANT CHANGE UP (ref 8.4–10.5)
CHLORIDE SERPL-SCNC: 106 MMOL/L — SIGNIFICANT CHANGE UP (ref 96–108)
CO2 SERPL-SCNC: 20 MMOL/L — LOW (ref 22–31)
CREAT SERPL-MCNC: 1.08 MG/DL — SIGNIFICANT CHANGE UP (ref 0.5–1.3)
EGFR: 81 ML/MIN/1.73M2 — SIGNIFICANT CHANGE UP
EGFR: 81 ML/MIN/1.73M2 — SIGNIFICANT CHANGE UP
GLUCOSE SERPL-MCNC: 115 MG/DL — HIGH (ref 70–99)
HCT VFR BLD CALC: 43.9 % — SIGNIFICANT CHANGE UP (ref 39–50)
HGB BLD-MCNC: 14.4 G/DL — SIGNIFICANT CHANGE UP (ref 13–17)
MAGNESIUM SERPL-MCNC: 2.2 MG/DL — SIGNIFICANT CHANGE UP (ref 1.6–2.6)
MCHC RBC-ENTMCNC: 29.9 PG — SIGNIFICANT CHANGE UP (ref 27–34)
MCHC RBC-ENTMCNC: 32.8 G/DL — SIGNIFICANT CHANGE UP (ref 32–36)
MCV RBC AUTO: 91.3 FL — SIGNIFICANT CHANGE UP (ref 80–100)
NRBC BLD AUTO-RTO: 0 /100 WBCS — SIGNIFICANT CHANGE UP (ref 0–0)
PHOSPHATE SERPL-MCNC: 4.2 MG/DL — SIGNIFICANT CHANGE UP (ref 2.5–4.5)
PLATELET # BLD AUTO: 236 K/UL — SIGNIFICANT CHANGE UP (ref 150–400)
POTASSIUM SERPL-MCNC: 4 MMOL/L — SIGNIFICANT CHANGE UP (ref 3.5–5.3)
POTASSIUM SERPL-SCNC: 4 MMOL/L — SIGNIFICANT CHANGE UP (ref 3.5–5.3)
PROT S FREE PPP-ACNC: 44 % — LOW (ref 63–140)
RBC # BLD: 4.81 M/UL — SIGNIFICANT CHANGE UP (ref 4.2–5.8)
RBC # FLD: 13.8 % — SIGNIFICANT CHANGE UP (ref 10.3–14.5)
SODIUM SERPL-SCNC: 141 MMOL/L — SIGNIFICANT CHANGE UP (ref 135–145)
WBC # BLD: 3.59 K/UL — LOW (ref 3.8–10.5)
WBC # FLD AUTO: 3.59 K/UL — LOW (ref 3.8–10.5)

## 2025-05-23 PROCEDURE — 85306 CLOT INHIBIT PROT S FREE: CPT

## 2025-05-23 PROCEDURE — 70551 MRI BRAIN STEM W/O DYE: CPT

## 2025-05-23 PROCEDURE — 86901 BLOOD TYPING SEROLOGIC RH(D): CPT

## 2025-05-23 PROCEDURE — 84443 ASSAY THYROID STIM HORMONE: CPT

## 2025-05-23 PROCEDURE — 80307 DRUG TEST PRSMV CHEM ANLYZR: CPT

## 2025-05-23 PROCEDURE — 76937 US GUIDE VASCULAR ACCESS: CPT

## 2025-05-23 PROCEDURE — 92611 MOTION FLUOROSCOPY/SWALLOW: CPT

## 2025-05-23 PROCEDURE — 86147 CARDIOLIPIN ANTIBODY EA IG: CPT

## 2025-05-23 PROCEDURE — 86850 RBC ANTIBODY SCREEN: CPT

## 2025-05-23 PROCEDURE — 94002 VENT MGMT INPAT INIT DAY: CPT

## 2025-05-23 PROCEDURE — 99285 EMERGENCY DEPT VISIT HI MDM: CPT | Mod: 25

## 2025-05-23 PROCEDURE — 86900 BLOOD TYPING SEROLOGIC ABO: CPT

## 2025-05-23 PROCEDURE — 84436 ASSAY OF TOTAL THYROXINE: CPT

## 2025-05-23 PROCEDURE — 87150 DNA/RNA AMPLIFIED PROBE: CPT

## 2025-05-23 PROCEDURE — 84480 ASSAY TRIIODOTHYRONINE (T3): CPT

## 2025-05-23 PROCEDURE — C1887: CPT

## 2025-05-23 PROCEDURE — 85730 THROMBOPLASTIN TIME PARTIAL: CPT

## 2025-05-23 PROCEDURE — 97110 THERAPEUTIC EXERCISES: CPT

## 2025-05-23 PROCEDURE — 93458 L HRT ARTERY/VENTRICLE ANGIO: CPT

## 2025-05-23 PROCEDURE — 86146 BETA-2 GLYCOPROTEIN ANTIBODY: CPT

## 2025-05-23 PROCEDURE — 96374 THER/PROPH/DIAG INJ IV PUSH: CPT | Mod: XU

## 2025-05-23 PROCEDURE — 82962 GLUCOSE BLOOD TEST: CPT

## 2025-05-23 PROCEDURE — 87637 SARSCOV2&INF A&B&RSV AMP PRB: CPT

## 2025-05-23 PROCEDURE — 80048 BASIC METABOLIC PNL TOTAL CA: CPT

## 2025-05-23 PROCEDURE — 92610 EVALUATE SWALLOWING FUNCTION: CPT

## 2025-05-23 PROCEDURE — 82803 BLOOD GASES ANY COMBINATION: CPT

## 2025-05-23 PROCEDURE — 84439 ASSAY OF FREE THYROXINE: CPT

## 2025-05-23 PROCEDURE — 82435 ASSAY OF BLOOD CHLORIDE: CPT

## 2025-05-23 PROCEDURE — 87641 MR-STAPH DNA AMP PROBE: CPT

## 2025-05-23 PROCEDURE — 97161 PT EVAL LOW COMPLEX 20 MIN: CPT

## 2025-05-23 PROCEDURE — 70450 CT HEAD/BRAIN W/O DYE: CPT

## 2025-05-23 PROCEDURE — 81241 F5 GENE: CPT

## 2025-05-23 PROCEDURE — 83090 ASSAY OF HOMOCYSTEINE: CPT

## 2025-05-23 PROCEDURE — 80061 LIPID PANEL: CPT

## 2025-05-23 PROCEDURE — 83605 ASSAY OF LACTIC ACID: CPT

## 2025-05-23 PROCEDURE — 81240 F2 GENE: CPT

## 2025-05-23 PROCEDURE — C9399: CPT

## 2025-05-23 PROCEDURE — 92523 SPEECH SOUND LANG COMPREHEN: CPT

## 2025-05-23 PROCEDURE — 81001 URINALYSIS AUTO W/SCOPE: CPT

## 2025-05-23 PROCEDURE — 97165 OT EVAL LOW COMPLEX 30 MIN: CPT

## 2025-05-23 PROCEDURE — 82330 ASSAY OF CALCIUM: CPT

## 2025-05-23 PROCEDURE — A9585: CPT

## 2025-05-23 PROCEDURE — 97535 SELF CARE MNGMENT TRAINING: CPT

## 2025-05-23 PROCEDURE — 93970 EXTREMITY STUDY: CPT

## 2025-05-23 PROCEDURE — 85613 RUSSELL VIPER VENOM DILUTED: CPT

## 2025-05-23 PROCEDURE — 70498 CT ANGIOGRAPHY NECK: CPT

## 2025-05-23 PROCEDURE — 37195 THROMBOLYTIC THERAPY STROKE: CPT

## 2025-05-23 PROCEDURE — 74183 MRI ABD W/O CNTR FLWD CNTR: CPT

## 2025-05-23 PROCEDURE — 71275 CT ANGIOGRAPHY CHEST: CPT

## 2025-05-23 PROCEDURE — 85025 COMPLETE CBC W/AUTO DIFF WBC: CPT

## 2025-05-23 PROCEDURE — C1760: CPT

## 2025-05-23 PROCEDURE — 83735 ASSAY OF MAGNESIUM: CPT

## 2025-05-23 PROCEDURE — 85027 COMPLETE CBC AUTOMATED: CPT

## 2025-05-23 PROCEDURE — 83036 HEMOGLOBIN GLYCOSYLATED A1C: CPT

## 2025-05-23 PROCEDURE — 94640 AIRWAY INHALATION TREATMENT: CPT

## 2025-05-23 PROCEDURE — 84132 ASSAY OF SERUM POTASSIUM: CPT

## 2025-05-23 PROCEDURE — 85014 HEMATOCRIT: CPT

## 2025-05-23 PROCEDURE — 97530 THERAPEUTIC ACTIVITIES: CPT

## 2025-05-23 PROCEDURE — C1757: CPT

## 2025-05-23 PROCEDURE — C8929: CPT

## 2025-05-23 PROCEDURE — 70496 CT ANGIOGRAPHY HEAD: CPT

## 2025-05-23 PROCEDURE — 93005 ELECTROCARDIOGRAM TRACING: CPT

## 2025-05-23 PROCEDURE — 85307 ASSAY ACTIVATED PROTEIN C: CPT

## 2025-05-23 PROCEDURE — 80053 COMPREHEN METABOLIC PANEL: CPT

## 2025-05-23 PROCEDURE — 61645 PERQ ART M-THROMBECT &/NFS: CPT

## 2025-05-23 PROCEDURE — 74230 X-RAY XM SWLNG FUNCJ C+: CPT

## 2025-05-23 PROCEDURE — 87640 STAPH A DNA AMP PROBE: CPT

## 2025-05-23 PROCEDURE — 93970 EXTREMITY STUDY: CPT | Mod: 26

## 2025-05-23 PROCEDURE — 36415 COLL VENOUS BLD VENIPUNCTURE: CPT

## 2025-05-23 PROCEDURE — 87077 CULTURE AEROBIC IDENTIFY: CPT

## 2025-05-23 PROCEDURE — 97116 GAIT TRAINING THERAPY: CPT

## 2025-05-23 PROCEDURE — 84295 ASSAY OF SERUM SODIUM: CPT

## 2025-05-23 PROCEDURE — 87040 BLOOD CULTURE FOR BACTERIA: CPT

## 2025-05-23 PROCEDURE — 84484 ASSAY OF TROPONIN QUANT: CPT

## 2025-05-23 PROCEDURE — 82947 ASSAY GLUCOSE BLOOD QUANT: CPT

## 2025-05-23 PROCEDURE — 71045 X-RAY EXAM CHEST 1 VIEW: CPT

## 2025-05-23 PROCEDURE — 85018 HEMOGLOBIN: CPT

## 2025-05-23 PROCEDURE — 85610 PROTHROMBIN TIME: CPT

## 2025-05-23 PROCEDURE — 84100 ASSAY OF PHOSPHORUS: CPT

## 2025-05-23 PROCEDURE — 0042T: CPT

## 2025-05-23 PROCEDURE — 74177 CT ABD & PELVIS W/CONTRAST: CPT

## 2025-05-23 PROCEDURE — C1769: CPT

## 2025-05-23 PROCEDURE — C1894: CPT

## 2025-05-23 PROCEDURE — 83880 ASSAY OF NATRIURETIC PEPTIDE: CPT

## 2025-05-23 PROCEDURE — 85303 CLOT INHIBIT PROT C ACTIVITY: CPT

## 2025-05-23 PROCEDURE — 85300 ANTITHROMBIN III ACTIVITY: CPT

## 2025-05-23 RX ORDER — ATORVASTATIN CALCIUM 80 MG/1
1 TABLET, FILM COATED ORAL
Qty: 90 | Refills: 0
Start: 2025-05-23 | End: 2025-08-20

## 2025-05-23 RX ORDER — APIXABAN 2.5 MG/1
1 TABLET, FILM COATED ORAL
Qty: 180 | Refills: 0
Start: 2025-05-23 | End: 2025-08-20

## 2025-05-23 RX ORDER — NICOTINE POLACRILEX 4 MG/1
1 GUM, CHEWING ORAL
Qty: 30 | Refills: 0
Start: 2025-05-23 | End: 2025-06-21

## 2025-05-23 RX ORDER — DAPAGLIFLOZIN 5 MG/1
1 TABLET, FILM COATED ORAL
Qty: 90 | Refills: 0
Start: 2025-05-23 | End: 2025-08-20

## 2025-05-23 RX ORDER — METOPROLOL SUCCINATE 50 MG/1
1 TABLET, EXTENDED RELEASE ORAL
Qty: 90 | Refills: 0
Start: 2025-05-23 | End: 2025-08-20

## 2025-05-23 RX ADMIN — NICOTINE POLACRILEX 1 PATCH: 4 GUM, CHEWING ORAL at 08:21

## 2025-05-23 RX ADMIN — Medication 25 GRAM(S): at 06:06

## 2025-05-23 RX ADMIN — DAPAGLIFLOZIN 10 MILLIGRAM(S): 5 TABLET, FILM COATED ORAL at 12:49

## 2025-05-23 RX ADMIN — Medication 1 TABLET(S): at 12:49

## 2025-05-23 RX ADMIN — APIXABAN 5 MILLIGRAM(S): 2.5 TABLET, FILM COATED ORAL at 10:21

## 2025-05-23 RX ADMIN — METOPROLOL SUCCINATE 25 MILLIGRAM(S): 50 TABLET, EXTENDED RELEASE ORAL at 06:06

## 2025-05-23 RX ADMIN — NICOTINE POLACRILEX 1 PATCH: 4 GUM, CHEWING ORAL at 17:09

## 2025-05-23 NOTE — PROGRESS NOTE ADULT - SUBJECTIVE AND OBJECTIVE BOX
Name of Patient : TIFFANY SANCHEZ  MRN: [id number]  Date of visit: 05-23-25       Subjective: Patient seen and examined. No new events except as noted.   doing okay     REVIEW OF SYSTEMS:    CONSTITUTIONAL: No weakness, fevers or chills  EYES/ENT: No visual changes;  No vertigo or throat pain   NECK: No pain or stiffness  RESPIRATORY: No cough, wheezing, hemoptysis; No shortness of breath  CARDIOVASCULAR: No chest pain or palpitations  GASTROINTESTINAL: No abdominal or epigastric pain. No nausea, vomiting, or hematemesis; No diarrhea or constipation. No melena or hematochezia.  GENITOURINARY: No dysuria, frequency or hematuria  NEUROLOGICAL: No numbness or weakness  SKIN: No itching, burning, rashes, or lesions   All other review of systems is negative unless indicated above.    MEDICATIONS:  MEDICATIONS  (STANDING):  apixaban 5 milliGRAM(s) Oral every 12 hours  atorvastatin 80 milliGRAM(s) Oral at bedtime  dapagliflozin 10 milliGRAM(s) Oral daily  lactobacillus acidophilus 1 Tablet(s) Oral daily  metoprolol succinate ER 25 milliGRAM(s) Oral daily  nicotine -  14 mG/24Hr(s) Patch 1 Patch Transdermal daily  polyethylene glycol 3350 17 Gram(s) Oral two times a day  senna 2 Tablet(s) Oral at bedtime  tamsulosin 0.4 milliGRAM(s) Oral at bedtime      PHYSICAL EXAM:  T(C): 36.9 (05-23-25 @ 12:00), Max: 37 (05-23-25 @ 00:00)  HR: 79 (05-23-25 @ 14:00) (67 - 113)  BP: 123/89 (05-23-25 @ 14:00) (99/54 - 134/89)  RR: 18 (05-23-25 @ 14:00) (12 - 47)  SpO2: 100% (05-23-25 @ 14:00) (90% - 100%)  Wt(kg): --  I&O's Summary    22 May 2025 07:01  -  23 May 2025 07:00  --------------------------------------------------------  IN: 1210 mL / OUT: 1425 mL / NET: -215 mL    23 May 2025 07:01  -  23 May 2025 18:23  --------------------------------------------------------  IN: 25 mL / OUT: 0 mL / NET: 25 mL          Appearance: Normal	  HEENT:  PERRLA   Lymphatic: No lymphadenopathy   Cardiovascular: Normal S1 S2, no JVD  Respiratory: normal effort , clear  Gastrointestinal:  Soft, Non-tender  Skin: No rashes,  warm to touch  Psychiatry:  Mood & affect appropriate  Musculuskeletal: No edema    recent labs, Imaging and EKGs personally reviewed     05-22-25 @ 07:01  -  05-23-25 @ 07:00  --------------------------------------------------------  IN: 1210 mL / OUT: 1425 mL / NET: -215 mL    05-23-25 @ 07:01  -  05-23-25 @ 18:23  --------------------------------------------------------  IN: 25 mL / OUT: 0 mL / NET: 25 mL                          14.4   3.59  )-----------( 236      ( 23 May 2025 06:26 )             43.9               05-23    141  |  106  |  13  ----------------------------<  115[H]  4.0   |  20[L]  |  1.08    Ca    8.8      23 May 2025 06:26  Phos  4.2     05-23  Mg     2.2     05-23                         Urinalysis Basic - ( 23 May 2025 06:26 )    Color: x / Appearance: x / SG: x / pH: x  Gluc: 115 mg/dL / Ketone: x  / Bili: x / Urobili: x   Blood: x / Protein: x / Nitrite: x   Leuk Esterase: x / RBC: x / WBC x   Sq Epi: x / Non Sq Epi: x / Bacteria: x

## 2025-05-23 NOTE — DISCHARGE NOTE PROVIDER - PROVIDER TOKENS
PROVIDER:[TOKEN:[[id number]],FOLLOWUP:[2 weeks]],PROVIDER:[TOKEN:[[id number]],FOLLOWUP:[2 weeks]] PROVIDER:[TOKEN:[[id number]],FOLLOWUP:[2 weeks]],PROVIDER:[TOKEN:[[id number]],FOLLOWUP:[2 weeks]],PROVIDER:[TOKEN:[[id number]],FOLLOWUP:[1 week]],PROVIDER:[TOKEN:[[id number]],FOLLOWUP:[2 weeks]]

## 2025-05-23 NOTE — PROGRESS NOTE ADULT - ASSESSMENT
55 YO RH M with PMHx of current smoker and HTN presented with left hemiparesis and slurred speech and found with R M1 occlusion with acute CVA and s/p TNK and thrombectomy. Admitted to neuro and internal medicine consulted for further management.     # L hemiparesis and slurred speech second to acute right CVA with R M1 occlusion   - CTH with right basal ganglia and right insular cortex acute infarct and chronic small infarct in right frontal area.   - CTA HEAD with right MCA M1 occlusion.   - CTA NECK with no disease   - PanCT with no PE   - MRI BRAIN with aute infarcts in the right basal ganglia/corona radiata and high right paramedian frontal lobe. Questionable punctate acute infarct in the left cerebellar hemisphere.  - s/p TNK 5/18   - s/p neuro IR TICI 3 recanalization 5/18   - Passed dysphagia screen   - TTE with no PFO, but noted with new HFrEF as below  - SS with no dysphagia  - Case discussed with neuro and given new HFrEF concern for possible embolic source   - Continue on lipitor and eliquis   - Pending hypercoagulable work up   - Fall, seizure and aspiration precautions   - Neuro checks per floor protocol   - Care per neuro     # SOB with pulm edema likely second to ADHF vs aspiration?   - CTA CHEST with no PE, however noted with pulmonary edema   - Currently on RA   - Monitor respiratory status  - Monitor volume status   - Supplemental O2 to keep SPO2 > 92    # SEVERE HFrEF 15 and biventricular dysfunction   - TTE with EF 15 with global hypokinesis, severe grade 3 LV ddfxn, LBH, mild to moderate MR, reduced RVSF.   - Continue on toprol 25 and farxiga  - Monitor volume status   - Monitor IO   - Keep O>I  - LHC today, F/u results  - GDMT as per Cardio   - Monitor on tele   - Cards rec outpt cardiac ischemic workup  - Cards appreciated     # LLE soleal DVT   - DOPPLER with acute LLE soleal DVT  - Continue on eliqius  - repeat DVT study positive, cont elqiuis  - repeat DVT STudy in 1 week     # Fever from aspiration vs DVT vs UTI?   - Fever of 101.2 on 5/20   - CTA CHEST with no PE, however noted with GGO more likely edema  - UA with trace leuks and pyuria, but negative bacteria   - MiniRVP negative   - MRSA PCR negative  - Given CVA with GGO on CT recc SS, however no dysphagia   - Fevers could be second to DVT ?   - Continue on zosyn for 5-7 day course  - 5/20 BCx1 of 2 +; Likely contaminant as Staph epi. Check repeat BCx   - Trend CBC, temp curve, VS and adjust as tolerated     # NAGMA   - HCO3 dropping  - Previous gas on admission with mild metabolic acidosis   - Monitor acidosis     # Liver lesions  - CT AP with multiple liver lesions, likely a combination of cysts and hemangioma. Additional indeterminate lesions in the right hepatic lobe.   - MRI with normal liver morphology. No significant steatosis. A 1.6 cm segment 6 hemangioma and scattered subcentimeter cysts. No suspicious enhancing lesions. 2 of the queried lesions on recent CT are not reproducible on MRI.  - LFTs normal   - Outpatient GI follow up     # Current smoker   - 1 PPD x 40 years   - Smoking cessation counselling   - Nicotine patch     # BPH   - Continue on flomax   - Monitor UOP     # DVT PPX with DOAC

## 2025-05-23 NOTE — CHART NOTE - NSCHARTNOTESELECT_GEN_ALL_CORE
Event Note
Neurology/Event Note
Off Service Note
Stroke Service/Transfer Note
Interventional Neuro Radiology/Event Note
VTE Risk Assessment/Event Note

## 2025-05-23 NOTE — DISCHARGE NOTE NURSING/CASE MANAGEMENT/SOCIAL WORK - FINANCIAL ASSISTANCE
[facility] provides services at a reduced cost to those who are determined to be eligible through [facility]’s financial assistance program. Information regarding [facility]’s financial assistance program can be found by going to [email or web address] or by calling [phone].

## 2025-05-23 NOTE — DISCHARGE NOTE PROVIDER - NSDCMRMEDTOKEN_GEN_ALL_CORE_FT
apixaban 5 mg oral tablet: 1 tab(s) orally every 12 hours  atorvastatin 80 mg oral tablet: 1 tab(s) orally once a day (at bedtime)  dapagliflozin 10 mg oral tablet: 1 tab(s) orally once a day  Flomax 0.4 mg oral capsule: 1 cap(s) orally once a day  metoprolol succinate 25 mg oral tablet, extended release: 1 tab(s) orally once a day  nicotine 14 mg/24 hr transdermal film, extended release: 1 patch transdermal once a day

## 2025-05-23 NOTE — DISCHARGE NOTE NURSING/CASE MANAGEMENT/SOCIAL WORK - PATIENT PORTAL LINK FT
You can access the FollowMyHealth Patient Portal offered by [facility] by registering at the following website: [email or web address]. By joining [employer]’s FollowMyHealth portal, you will also be able to view your health information using other applications (apps) compatible with our system.

## 2025-05-23 NOTE — DISCHARGE NOTE PROVIDER - CARE PROVIDERS DIRECT ADDRESSES
,DirectAddress_Unknown,DirectAddress_Unknown ,DirectAddress_Unknown,DirectAddress_Unknown,[email or web address]

## 2025-05-23 NOTE — PROGRESS NOTE ADULT - PROVIDER SPECIALTY LIST ADULT
Internal Medicine
Internal Medicine
Intervent Radiology
Neurology
Neurology
Neurosurgery
Internal Medicine
NSICU
Neurology
Cardiology
NSICU
NSICU
Neurology
Vascular Cardiology
Cardiology
Cardiology

## 2025-05-23 NOTE — PROGRESS NOTE ADULT - CRITICAL CARE ATTENDING COMMENT
seen in stroke unit with stroke team on morning rounds   frequent neuro checks   s/p LHC  statin therapy   on DOAC  LE duplex pending   dcp when able   Enrique Blanton MD  Vascular Neurology  Office: [phone]
[facility] cath today  f/u cardio  DOAC and statin   from stroke standpoint workup is complete  Enrique Blanton MD  Vascular Neurology  Office: [phone]

## 2025-05-23 NOTE — DISCHARGE NOTE PROVIDER - NSDCFUADDAPPT_GEN_ALL_CORE_FT
APPTS ARE READY TO BE MADE: [X] YES    Best Family or Patient Contact (if needed):    Additional Information about above appointments (if needed):    1: neurology  2: vascular cardiology  3: cardiology    Other comments or requests:

## 2025-05-23 NOTE — PROGRESS NOTE ADULT - SUBJECTIVE AND OBJECTIVE BOX
Subjective: Patient seen and examined. No new events except as noted.   remains in Stroke Unit      REVIEW OF SYSTEMS:    CONSTITUTIONAL:+ weakness, fevers or chills  EYES/ENT: No visual changes;  No vertigo or throat pain   NECK: No pain or stiffness  RESPIRATORY: No cough, wheezing, hemoptysis; No shortness of breath  CARDIOVASCULAR: No chest pain or palpitations  GASTROINTESTINAL: No abdominal or epigastric pain. No nausea, vomiting, or hematemesis; No diarrhea or constipation. No melena or hematochezia.  GENITOURINARY: No dysuria, frequency or hematuria  NEUROLOGICAL: No numbness or weakness  SKIN: No itching, burning, rashes, or lesions   All other review of systems is negative unless indicated above.    MEDICATIONS:  MEDICATIONS  (STANDING):  apixaban 5 milliGRAM(s) Oral every 12 hours  atorvastatin 80 milliGRAM(s) Oral at bedtime  dapagliflozin 10 milliGRAM(s) Oral daily  lactobacillus acidophilus 1 Tablet(s) Oral daily  metoprolol succinate ER 25 milliGRAM(s) Oral daily  nicotine -  14 mG/24Hr(s) Patch 1 Patch Transdermal daily  piperacillin/tazobactam IVPB.. 3.375 Gram(s) IV Intermittent every 8 hours  polyethylene glycol 3350 17 Gram(s) Oral two times a day  senna 2 Tablet(s) Oral at bedtime  tamsulosin 0.4 milliGRAM(s) Oral at bedtime      PHYSICAL EXAM:  T(C): 36.6 (05-23-25 @ 08:00), Max: 37 (05-23-25 @ 00:00)  HR: 80 (05-23-25 @ 08:00) (70 - 91)  BP: 99/54 (05-23-25 @ 08:00) (99/54 - 145/98)  RR: 21 (05-23-25 @ 08:00) (12 - 27)  SpO2: 98% (05-23-25 @ 08:00) (94% - 100%)  Wt(kg): --  I&O's Summary    22 May 2025 07:01  -  23 May 2025 07:00  --------------------------------------------------------  IN: 1210 mL / OUT: 1425 mL / NET: -215 mL    23 May 2025 07:01  -  23 May 2025 09:17  --------------------------------------------------------  IN: 25 mL / OUT: 0 mL / NET: 25 mL          Appearance: NAD   HEENT:   Normal oral mucosa, PERRL, EOMI	  Lymphatic: No lymphadenopathy , no edema  Cardiovascular: Normal S1 S2, No JVD, No murmurs , Peripheral pulses palpable 2+ bilaterally  Respiratory: Lungs clear to auscultation, normal effort 	  Gastrointestinal:  Soft, Non-tender, + BS	  Skin: No rashes, No ecchymoses, No cyanosis, warm to touch  Musculoskeletal: Normal range of motion, normal strength  Psychiatry:  Mood & affect appropriate  Ext: No edema      LABS:    CARDIAC MARKERS:                                14.4   3.59  )-----------( 236      ( 23 May 2025 06:26 )             43.9     05-23    141  |  106  |  13  ----------------------------<  115[H]  4.0   |  20[L]  |  1.08    Ca    8.8      23 May 2025 06:26  Phos  4.2     05-23  Mg     2.2     05-23          TELEMETRY: SR	    ECG:  	  RADIOLOGY:   DIAGNOSTIC TESTING:  [ ] Echocardiogram:  [ ]  Catheterization:  cat  [ ] Stress Test:    OTHER:

## 2025-05-23 NOTE — CHART NOTE - NSCHARTNOTEFT_GEN_A_CORE
VA duplex report noted.  Report states "Examination findings were conveyed to Dr. Leigh by vascular technologist Olivia at 1120 hours on 5/18/2025 with read back." Which is incorrect. No call or teams message was received.    Findings discussed with medicine team by stroke team colleague. Will continue AC and obtain serial dopplers in the out-patient setting.

## 2025-05-23 NOTE — PROGRESS NOTE ADULT - SUBJECTIVE AND OBJECTIVE BOX
THE PATIENT WAS SEEN AND EXAMINED BY ME WITH THE HOUSESTAFF AND STROKE TEAM DURING MORNING ROUNDS.   HPI: Patient TIFFANY SANCHEZ is a 54y (1970) RH man with a PMHx significant for HTN presenting as code stroke for Left sided weakness. Patient was driving, pulled over  due to chest pain and SOB. Neighbor called EMS after finding him on the floor with slurred speech and L sided weakness. Spoke with Wife Daiana, who saw him this morning at his baseline. He was complaining of SOB. Denies being on AC/AP. No hx stroke. Does not take any medications. At baseline, o x3, ambulates without assistance, independent of all Adls.    SUBJECTIVE: No events overnight.  No new neurologic complaints.      acetaminophen     Tablet .. 650 milliGRAM(s) Oral every 6 hours PRN  albuterol/ipratropium for Nebulization 3 milliLiter(s) Nebulizer every 6 hours PRN  apixaban 5 milliGRAM(s) Oral every 12 hours  atorvastatin 80 milliGRAM(s) Oral at bedtime  dapagliflozin 10 milliGRAM(s) Oral daily  lactobacillus acidophilus 1 Tablet(s) Oral daily  metoprolol succinate ER 25 milliGRAM(s) Oral daily  nicotine -  14 mG/24Hr(s) Patch 1 Patch Transdermal daily  piperacillin/tazobactam IVPB.. 3.375 Gram(s) IV Intermittent every 8 hours  polyethylene glycol 3350 17 Gram(s) Oral two times a day  senna 2 Tablet(s) Oral at bedtime  tamsulosin 0.4 milliGRAM(s) Oral at bedtime      PHYSICAL EXAM:   Vital Signs Last 24 Hrs  T(C): 36.6 (23 May 2025 08:00), Max: 37 (23 May 2025 00:00)  T(F): 97.9 (23 May 2025 08:00), Max: 98.6 (23 May 2025 00:00)  HR: 80 (23 May 2025 08:00) (70 - 91)  BP: 99/54 (23 May 2025 08:00) (99/54 - 145/98)  BP(mean): 70 (23 May 2025 08:00) (70 - 118)  RR: 21 (23 May 2025 08:00) (12 - 27)  SpO2: 98% (23 May 2025 08:00) (94% - 100%)    Parameters below as of 23 May 2025 08:00  Patient On (Oxygen Delivery Method): room air      General: No acute distress  HEENT: EOM intact, visual fields full  Abdomen: Soft, nontender, nondistended   Extremities: No edema    NEUROLOGICAL EXAM:  Mental status: Eyes open, awake, alert, oriented to self, month, day, and year, and hospital, speech fluent, repetition intact, no neglect, normal memory   Cranial Nerves: Mild left facial palsy, no nystagmus, no dysarthria,  tongue midline  Motor exam: Normal tone, Drift LUE and LLE extremities, no drifts RUE and RLE.    Sensation: Intact to light touch   Coordination/ Gait: No dysmetria, MADELEINE intact and symmetric bilaterally      LABS:                        14.4   3.59  )-----------( 236      ( 23 May 2025 06:26 )             43.9    05-23    141  |  106  |  13  ----------------------------<  115[H]  4.0   |  20[L]  |  1.08    Ca    8.8      23 May 2025 06:26  Phos  4.2     05-23  Mg     2.2     05-23          IMAGING: Reviewed by me.       MRI shows:  Acute infarcts in the right basal ganglia/corona radiata and high right paramedian frontal lobe. 2. Questionable punctate acute infarct in the left cerebellar hemisphere.  3. Chronic ischemic changes.    MR ab  Normal liver morphology. No significant steatosis. A 1.6 cm segment 6   hemangioma and scattered subcentimeter cysts. No suspicious enhancing   lesions. 2 of the queried lesions on recent CT are not reproducible on   MRI.      CT IMPRESSION: 5/18  No acute intracranial hemorrhage, mass effect, or midline shift.  Age-indeterminate infarct in the right frontal white matter.      IMPRESSION:  CT angiogram neck:  -No vaso-occlusive disease.    CT angiogram head:  -Right MCA M1 occlusion. Relative paucity of flow related enhancement in   the distal right MCA branches.     CT perfusion:  -144 mL at risk ischemic tissue involving the right MCA territory   diffusely.  -0 mL core infarct identified.  -Mismatch ratio: Infinite.  -Mismatch volume: 144 mL.    CT CAP 5/19  No pulmonary embolism.  Subtle central predominant groundglass opacities with septal thickening,   which are nonspecific but may be seen in setting of pulmonary edema.  Multiple liver lesions, likely a combination of cysts and hemangioma.   Additional indeterminate lesions in the right hepatic lobe, for further   evaluation with MRI with and without contrast may be obtained.    5/19 rCTH  Hypoattenuation in the right basal ganglia and right periinsular cortex,   compatible with acute infarct. No associated hemorrhage or mass effect.  Small chronic infarct in the right frontal white matter adjacent to the   right frontal horn is reidentified.    TTE   1. Left ventricular cavity is mildly dilated. Left ventricular systolic function is severely decreased with an ejection fraction of 15 % by Greene's method of disks. Global left ventricular hypokinesis.   2. There is severe (grade 3) left ventricular diastolic dysfunction, with elevated left ventricular filling pressure.   3. There is increased LV mass and eccentric hypertrophy.   4. Normal right ventricular cavity size, with normal wall thickness,and reduced right ventricular systolic function.   5. Mild to moderate mitral regurgitation. Mechanism of mitral regurgitation: The mechanism of mitral regurgitation is secondary due to left venrticular dysfunction.   6. No pericardial effusion seen.   7. Agitated saline injection was negative for intracardiac shunt.   8. Pulmonary artery systolic pressure could not be estimated.   9. Findings were discussed with LUIS FERNANDO Pleitez on 5/19/2025.

## 2025-05-23 NOTE — PROGRESS NOTE ADULT - PROBLEM SELECTOR PLAN 1
R M1 occlusion s/p tenecteplase and thrombectomy TICI 3 Mechanism embolic due to low EF  TTE: EF 15%  cont Eliquis   s/p [facility] 5/22: norm cors, non ischemic cardiomyopathy  Outpt cardiac workup to start GDMT   MRI done
R M1 occlusion s/p tenecteplase and thrombectomy TICI 3 Mechanism embolic due to low EF  TTE: EF 15%  Started on Eliquis   Outpt cardiac workup to start GDMT   MRI done
R M1 occlusion s/p tenecteplase and thrombectomy TICI 3 Mechanism embolic due to low EF  TTE: EF 15%  cont Eliquis   s/p [facility] 5/22: norm cors, non ischemic cardiomyopathy  Outpt cardiac workup to start GDMT   NO cardiac objection to Discharge to rehab  Outpt follow up in my office

## 2025-05-23 NOTE — DISCHARGE NOTE PROVIDER - CARE PROVIDER_API CALL
Enrique Blanton  Neurology  [address], [address]  [place] [address]  Phone: [phone]  Fax: [phone]  Follow Up Time: 2 weeks    Yasir Solorzano  [address], [address]  [place] [address]  Phone: [phone]  Fax: [phone]  Follow Up Time: 2 weeks   Enrique Blanton  Neurology  [address], [address]  [place] [address]  Phone: [phone]  Fax: [phone]  Follow Up Time: 2 weeks    Yasir Solorzano  Cardiology  [address], [address]  [place] [address]  Phone: [phone]  Fax: [phone]  Follow Up Time: 2 weeks    Ismael Santos  Internal Medicine  [address] [facility] [address]  [place] [address]  Phone: [phone]  Fax: [phone]  Follow Up Time: 1 week    [place]  Vascular Medicine  [address], [address] [place] [address]  Phone: [phone]  Fax: [phone]  Follow Up Time: 2 weeks

## 2025-05-23 NOTE — PROGRESS NOTE ADULT - PROBLEM SELECTOR PLAN 3
~pack a day/40 yrs  smoking cessation.

## 2025-05-23 NOTE — DISCHARGE NOTE PROVIDER - NSDCCPCAREPLAN_GEN_ALL_CORE_FT
PRINCIPAL DISCHARGE DIAGNOSIS  Diagnosis: Stroke  Assessment and Plan of Treatment: Please follow up with neurologist in 1 week. The office will call you to schedule an appointment, if you do not hear from them please call [phone] Continue taking medications as prescribed. If you were prescribed a statin for your cholesterol please make sure you have your liver enzymes checked with your primary care physician. Monitor your blood pressure. Reduce fat, cholesterol and salt in your diet. Increase intake of fruits and vegetables. Limit alcohol to minimum and do not smoke. You may be at risk for falling, make changes to your home to help you walk easier. Keep up to date on vaccinations.  If you experience any symptoms of facial drooping, slurred speech, arm or leg weakness, severe headache, vision changes or any worsening symptoms, notify provider immediately and return to ER. Continue Eliquis 5 mg BID for stroke prevention.

## 2025-05-23 NOTE — DISCHARGE NOTE PROVIDER - HOSPITAL COURSE
Patient TIFFANY SANCHEZ is a 54y (1970) RH man with a PMHx significant for HTN presenting as code stroke for Left sided weakness. Patient was driving, pulled over  due to chest pain and SOB. Neighbor called EMS after finding him on the floor with slurred speech and L sided weakness. Spoke with Wife Daiana, who saw him this morning at his baseline. He was complaining of SOB. Denies being on AC/AP. No hx stroke. Does not take any medications. At baseline, o x3, ambulates without assistance, independent of all Adls.    Imaging:    MRI shows:  Acute infarcts in the right basal ganglia/corona radiata and high right paramedian frontal lobe. 2. Questionable punctate acute infarct in the left cerebellar hemisphere.  3. Chronic ischemic changes.    MR ab  Normal liver morphology. No significant steatosis. A 1.6 cm segment 6   hemangioma and scattered subcentimeter cysts. No suspicious enhancing   lesions. 2 of the queried lesions on recent CT are not reproducible on   MRI.    CTH IMPRESSION: 5/18  No acute intracranial hemorrhage, mass effect, or midline shift.  Age-indeterminate infarct in the right frontal white matter.    IMPRESSION:  CT angiogram neck:  -No vaso-occlusive disease.  CT angiogram head:  -Right MCA M1 occlusion. Relative paucity of flow related enhancement in   the distal right MCA branches.   CT perfusion:  -144 mL at risk ischemic tissue involving the right MCA territory   diffusely.  -0 mL core infarct identified.  -Mismatch ratio: Infinite.  -Mismatch volume: 144 mL.    CT CAP 5/19  No pulmonary embolism.  Subtle central predominant groundglass opacities with septal thickening,   which are nonspecific but may be seen in setting of pulmonary edema.  Multiple liver lesions, likely a combination of cysts and hemangioma.   Additional indeterminate lesions in the right hepatic lobe, for further   evaluation with MRI with and without contrast may be obtained.    5/19 rCTH  Hypoattenuation in the right basal ganglia and right periinsular cortex,   compatible with acute infarct. No associated hemorrhage or mass effect.  Small chronic infarct in the right frontal white matter adjacent to the   right frontal horn is reidentified.    TTE   1. Left ventricular cavity is mildly dilated. Left ventricular systolic function is severely decreased with an ejection fraction of 15 % by Greene's method of disks. Global left ventricular hypokinesis.   2. There is severe (grade 3) left ventricular diastolic dysfunction, with elevated left ventricular filling pressure.   3. There is increased LV mass and eccentric hypertrophy.   4. Normal right ventricular cavity size, with normal wall thickness,and reduced right ventricular systolic function.   5. Mild to moderate mitral regurgitation. Mechanism of mitral regurgitation: The mechanism of mitral regurgitation is secondary due to left venrticular dysfunction.   6. No pericardial effusion seen.   7. Agitated saline injection was negative for intracardiac shunt.   8. Pulmonary artery systolic pressure could not be estimated.    Impression: Significant improvement with aphasia and left hemiplegia, initially presented with left hemiplegia, LHH, mild dysfluency with severe dysarthria, right gaze preference due to acute ischemic stroke, R M1 occlusion s/p tenecteplase and thrombectomy TICI 3 Mechanism embolic due to low EF.  Started on Eliquis 5 mg BID for stroke prevention.  TTE shows EF 15%, s/p LHC 5/22: norm cors, non ischemic cardiomyopathy  Outpt cardiac workup.    Evaluated by PT/OT and was recommended home with home services.  Patient TIFFANY SANCHEZ is a 54y (1970) RH man with a PMHx significant for HTN presenting as code stroke for Left sided weakness. Patient was driving, pulled over  due to chest pain and SOB. Neighbor called EMS after finding him on the floor with slurred speech and L sided weakness. Spoke with Wife Daiana, who saw him this morning at his baseline. He was complaining of SOB. Denies being on AC/AP. No hx stroke. Does not take any medications. At baseline, o x3, ambulates without assistance, independent of all Adls.    Imaging:    MRI shows:  Acute infarcts in the right basal ganglia/corona radiata and high right paramedian frontal lobe. 2. Questionable punctate acute infarct in the left cerebellar hemisphere.  3. Chronic ischemic changes.      CTH IMPRESSION: 5/18  No acute intracranial hemorrhage, mass effect, or midline shift.  Age-indeterminate infarct in the right frontal white matter.    IMPRESSION:  CT angiogram neck:  -No vaso-occlusive disease.  CT angiogram head:  -Right MCA M1 occlusion. Relative paucity of flow related enhancement in   the distal right MCA branches.   CT perfusion:  -144 mL at risk ischemic tissue involving the right MCA territory   diffusely.  -0 mL core infarct identified.  -Mismatch ratio: Infinite.  -Mismatch volume: 144 mL.    CT CAP 5/19  No pulmonary embolism.  Subtle central predominant groundglass opacities with septal thickening,   which are nonspecific but may be seen in setting of pulmonary edema.  Multiple liver lesions, likely a combination of cysts and hemangioma.   Additional indeterminate lesions in the right hepatic lobe, for further   evaluation with MRI with and without contrast may be obtained.    5/19 rCTH  Hypoattenuation in the right basal ganglia and right periinsular cortex,   compatible with acute infarct. No associated hemorrhage or mass effect.  Small chronic infarct in the right frontal white matter adjacent to the   right frontal horn is reidentified.    TTE   1. Left ventricular cavity is mildly dilated. Left ventricular systolic function is severely decreased with an ejection fraction of 15 % by Greene's method of disks. Global left ventricular hypokinesis.   2. There is severe (grade 3) left ventricular diastolic dysfunction, with elevated left ventricular filling pressure.   3. There is increased LV mass and eccentric hypertrophy.   4. Normal right ventricular cavity size, with normal wall thickness,and reduced right ventricular systolic function.   5. Mild to moderate mitral regurgitation. Mechanism of mitral regurgitation: The mechanism of mitral regurgitation is secondary due to left venrticular dysfunction.   6. No pericardial effusion seen.   7. Agitated saline injection was negative for intracardiac shunt.   8. Pulmonary artery systolic pressure could not be estimated.    Impression: Significant improvement with aphasia and left hemiplegia, initially presented with left hemiplegia, LHH, mild dysfluency with severe dysarthria, right gaze preference due to acute ischemic stroke, R M1 occlusion s/p tenecteplase and thrombectomy TICI 3 Mechanism embolic due to low EF.  Started on Eliquis 5 mg BID for stroke prevention.  VA Duplex LE: Acute DVT left soleal vein. No evidence for right lower extremity DVT.  TTE shows EF 15%, s/p LHC 5/22: norm cors, non ischemic cardiomyopathy  Outpt cardiac workup.  MRI Abdomen done for liver lesions: Normal liver morphology. No significant steatosis. A 1.6 cm segment 6   hemangioma and scattered subcentimeter cysts. No suspicious enhancing   lesions.      Evaluated by PT/OT and was recommended home with home services.     Patient TIFFANY SANCHEZ is a 54y (1970) RH man with a PMHx significant for HTN presenting as code stroke for Left sided weakness. Patient was driving, pulled over  due to chest pain and SOB. Neighbor called EMS after finding him on the floor with slurred speech and L sided weakness. Spoke with Wife Daiana, who saw him this morning at his baseline. He was complaining of SOB. Denies being on AC/AP. No hx stroke. Does not take any medications. At baseline, o x3, ambulates without assistance, independent of all Adls.    Imaging:    MRI shows:  Acute infarcts in the right basal ganglia/corona radiata and high right paramedian frontal lobe. 2. Questionable punctate acute infarct in the left cerebellar hemisphere.  3. Chronic ischemic changes.      CTH IMPRESSION: 5/18  No acute intracranial hemorrhage, mass effect, or midline shift.  Age-indeterminate infarct in the right frontal white matter.    IMPRESSION:  CT angiogram neck:  -No vaso-occlusive disease.  CT angiogram head:  -Right MCA M1 occlusion. Relative paucity of flow related enhancement in   the distal right MCA branches.   CT perfusion:  -144 mL at risk ischemic tissue involving the right MCA territory   diffusely.  -0 mL core infarct identified.  -Mismatch ratio: Infinite.  -Mismatch volume: 144 mL.    CT CAP 5/19  No pulmonary embolism.  Subtle central predominant groundglass opacities with septal thickening,   which are nonspecific but may be seen in setting of pulmonary edema.  Multiple liver lesions, likely a combination of cysts and hemangioma.   Additional indeterminate lesions in the right hepatic lobe, for further   evaluation with MRI with and without contrast may be obtained.    5/19 rCTH  Hypoattenuation in the right basal ganglia and right periinsular cortex,   compatible with acute infarct. No associated hemorrhage or mass effect.  Small chronic infarct in the right frontal white matter adjacent to the   right frontal horn is reidentified.    TTE   1. Left ventricular cavity is mildly dilated. Left ventricular systolic function is severely decreased with an ejection fraction of 15 % by Greene's method of disks. Global left ventricular hypokinesis.   2. There is severe (grade 3) left ventricular diastolic dysfunction, with elevated left ventricular filling pressure.   3. There is increased LV mass and eccentric hypertrophy.   4. Normal right ventricular cavity size, with normal wall thickness,and reduced right ventricular systolic function.   5. Mild to moderate mitral regurgitation. Mechanism of mitral regurgitation: The mechanism of mitral regurgitation is secondary due to left venrticular dysfunction.   6. No pericardial effusion seen.   7. Agitated saline injection was negative for intracardiac shunt.   8. Pulmonary artery systolic pressure could not be estimated.    Impression: Significant improvement with aphasia and left hemiplegia, initially presented with left hemiplegia, LHH, mild dysfluency with severe dysarthria, right gaze preference due to acute ischemic stroke, R M1 occlusion s/p tenecteplase and thrombectomy TICI 3 Mechanism embolic due to low EF.  Started on Eliquis 5 mg BID for stroke prevention.  VA Duplex LE: Acute DVT left soleal vein. No evidence for right lower extremity DVT. Repeat Duplex 05/23: Persistent below the knee DVT in the left calf with new involvement of   the gastrocnemius vein.  TTE shows EF 15%, s/p LHC 5/22: norm cors, non ischemic cardiomyopathy  Outpt cardiac workup.  MRI Abdomen done for liver lesions: Normal liver morphology. No significant steatosis. A 1.6 cm segment 6   hemangioma and scattered subcentimeter cysts. No suspicious enhancing   lesions.      Evaluated by PT/OT and was recommended home with home services.

## 2025-05-23 NOTE — PROGRESS NOTE ADULT - ASSESSMENT
54y (1970) RH man with a PMHx significant for HTN presenting as code stroke for Left sided weakness. On exam, left arm and leg drift, left facial palsy. Spoke with wife bedside. CT CAP reviewed, will consult medicine. Cardiology consult for low EF. Will need to be on AC, first will get MRI B     Impression: Significant improvement with aphasia and left hemiplegia, initially presented with left hemiplegia, LHH, mild dysfluency with severe dysarthria, right gaze preference due to acute ischemic stroke, R M1 occlusion s/p tenecteplase and thrombectomy TICI 3 Mechanism embolic due to low EF.    NEURO: Neurologically stable since admission. Continue close monitoring for neurologic deterioration, MRI Brain w/o result above, stable, no hemorrhage. HgbA1C 5.6, LDL 96    ANTITHROMBOTIC THERAPY: asa 81 mg daily stopped, started apixaban 5mg BID for low EF. Apixiban held 5/21 evening for Cardiac cath 5/22    PULMONARY: CXR clear on 5/20, protecting airway, saturating well. CT CAP shows possible pulmonary edema, pulmonology consulted. No signs of respiratory distress and patient is saturating well above 95% on pulse ox    CARDIOVASCULAR:  TTE shows EF 15%, pt planned for cardiac cath today 5/22, pt on metoprolol and farxiga as part of GDMT. No need for JACEK. MR brain stable, started eliquis 5mg bid                       SBP goal: <180/105    GASTROINTESTINAL: passed dysphagia screen , CT AP shows Multiple liver lesions, medicine consulted. GI consulted, recommended MR abdomen w/w/o (results as above).  MBS today to eval for dysphagia: results per note: No aspiration or laryngeal penetration. No pharyngeal residue. No indication for diet modification/ compensatory strategies.     Diet: regular     RENAL: BUN/Cr within normal limits, good urine output      Na Goal: Greater than 135     Pizano: No    HEMATOLOGY: H/H without change, Platelets 236. L soleal DVT (likely present on admission), appreciate Vascular recommendations. Repeat LE dopplers in 3-5 days. Hypercoagulable panel sent      DVT ppx: eliquis 5mg BID    ID: febrile 5/20 evening, infectious workup sent. Empiric abx started will continue course of zosyn, no leukocytosis. Blood cultures from 5/20 with positive gram cocci in clusters in aerobic bottle, repeat cultures ordered. Neutropenia, medicine following.     OTHER: Plan discussed with patient and family at bedside, all questions and concerns addressed. Smoking cessation education provided. Provided patient nicotine patch, and patient accepted.      DISPOSITION: Acute Rehab once stable for discharge      CORE MEASURES:         Admission NIHSS: 17     Tenecteplase: [x] YES [] NO     Statin Therapy: [x] YES [] NO     Smoking [x] YES [] NO     Afib [] YES [x] NO     Stroke Education [x] YES [] NO    Dysphagia screen : [x] Passed [] Failed- S&S pending [] Pending    DVT ppx: pt on eliquis     Obtain screening lower extremity venous ultrasound in patients who meet 1 or more of the following criteria as patient is high risk for DVT/PE on admission:   [] History of DVT/PE  []Hypercoagulable states (Factor V Leiden, Cancer, OCP, etc. )  []Prolonged immobility (hemiplegia/hemiparesis/post operative or any other extended immobilization)  [] Transferred from outside facility (Rehab or Long term care)  [] Age </= to 50.

## 2025-05-25 LAB
CULTURE RESULTS: SIGNIFICANT CHANGE UP
SPECIMEN SOURCE: SIGNIFICANT CHANGE UP

## 2025-05-27 LAB
CULTURE RESULTS: SIGNIFICANT CHANGE UP
CULTURE RESULTS: SIGNIFICANT CHANGE UP
SPECIMEN SOURCE: SIGNIFICANT CHANGE UP
SPECIMEN SOURCE: SIGNIFICANT CHANGE UP

## 2025-08-01 ENCOUNTER — APPOINTMENT (OUTPATIENT)
Dept: CARDIOLOGY | Facility: CLINIC | Age: 55
End: 2025-08-01